# Patient Record
Sex: MALE | Race: WHITE | NOT HISPANIC OR LATINO | Employment: FULL TIME | URBAN - METROPOLITAN AREA
[De-identification: names, ages, dates, MRNs, and addresses within clinical notes are randomized per-mention and may not be internally consistent; named-entity substitution may affect disease eponyms.]

---

## 2017-03-10 ENCOUNTER — ALLSCRIPTS OFFICE VISIT (OUTPATIENT)
Dept: OTHER | Facility: OTHER | Age: 57
End: 2017-03-10

## 2017-03-10 DIAGNOSIS — R10.13 EPIGASTRIC PAIN: ICD-10-CM

## 2017-03-10 DIAGNOSIS — R10.11 RIGHT UPPER QUADRANT PAIN: ICD-10-CM

## 2017-03-16 ENCOUNTER — GENERIC CONVERSION - ENCOUNTER (OUTPATIENT)
Dept: OTHER | Facility: OTHER | Age: 57
End: 2017-03-16

## 2017-03-16 ENCOUNTER — HOSPITAL ENCOUNTER (OUTPATIENT)
Dept: ULTRASOUND IMAGING | Facility: CLINIC | Age: 57
Discharge: HOME | End: 2017-03-16
Payer: COMMERCIAL

## 2017-03-16 DIAGNOSIS — R10.13 EPIGASTRIC PAIN: ICD-10-CM

## 2017-03-16 DIAGNOSIS — R10.11 RIGHT UPPER QUADRANT PAIN: ICD-10-CM

## 2017-03-16 PROCEDURE — 76705 ECHO EXAM OF ABDOMEN: CPT

## 2017-03-18 LAB
A/G RATIO (HISTORICAL): 2.4 (CALC) (ref 1–2.5)
ALBUMIN SERPL BCP-MCNC: 4.4 G/DL (ref 3.6–5.1)
ALP SERPL-CCNC: 86 U/L (ref 40–115)
ALT SERPL W P-5'-P-CCNC: 21 U/L (ref 9–46)
AST SERPL W P-5'-P-CCNC: 21 U/L (ref 10–35)
BASOPHILS # BLD AUTO: 2.9 %
BASOPHILS # BLD AUTO: 580 CELLS/UL (ref 0–200)
BILIRUB SERPL-MCNC: 1.3 MG/DL (ref 0.2–1.2)
BUN SERPL-MCNC: 18 MG/DL (ref 7–25)
BUN/CREA RATIO (HISTORICAL): ABNORMAL (CALC) (ref 6–22)
CALCIUM SERPL-MCNC: 9 MG/DL (ref 8.6–10.3)
CHLORIDE SERPL-SCNC: 102 MMOL/L (ref 98–110)
CO2 SERPL-SCNC: 32 MMOL/L (ref 20–31)
CREAT SERPL-MCNC: 0.98 MG/DL (ref 0.7–1.33)
DEPRECATED RDW RBC AUTO: 14.6 % (ref 11–15)
EGFR AFRICAN AMERICAN (HISTORICAL): 97 ML/MIN/1.73M2
EGFR-AMERICAN CALC (HISTORICAL): 84 ML/MIN/1.73M2
EOSINOPHIL # BLD AUTO: 0.6 %
EOSINOPHIL # BLD AUTO: 120 CELLS/UL (ref 15–500)
GAMMA GLOBULIN (HISTORICAL): 1.8 G/DL (CALC) (ref 1.9–3.7)
GLUCOSE (HISTORICAL): 70 MG/DL (ref 65–99)
HCT VFR BLD AUTO: 48.5 % (ref 38.5–50)
HGB BLD-MCNC: 15.6 G/DL (ref 13.2–17.1)
LYMPHOCYTES # BLD AUTO: 74.1 %
LYMPHOCYTES # BLD AUTO: ABNORMAL CELLS/UL (ref 850–3900)
MCH RBC QN AUTO: 29.1 PG (ref 27–33)
MCHC RBC AUTO-ENTMCNC: 32.1 G/DL (ref 32–36)
MCV RBC AUTO: 90.7 FL (ref 80–100)
MONOCYTES # BLD AUTO: 720 CELLS/UL (ref 200–950)
MONOCYTES (HISTORICAL): 3.6 %
NEUTROPHILS # BLD AUTO: 18.8 %
NEUTROPHILS # BLD AUTO: 3760 CELLS/UL (ref 1500–7800)
PLATELET # BLD AUTO: 145 THOUSAND/UL (ref 140–400)
PMV BLD AUTO: 9.9 FL (ref 7.5–12.5)
POTASSIUM SERPL-SCNC: 4.8 MMOL/L (ref 3.5–5.3)
RBC # BLD AUTO: 5.35 MILLION/UL (ref 4.2–5.8)
RBC MORPHOLOGY (HISTORICAL): ABNORMAL
SODIUM SERPL-SCNC: 140 MMOL/L (ref 135–146)
TOTAL PROTEIN (HISTORICAL): 6.2 G/DL (ref 6.1–8.1)
WBC # BLD AUTO: 20 THOUSAND/UL (ref 3.8–10.8)

## 2017-03-22 ENCOUNTER — ALLSCRIPTS OFFICE VISIT (OUTPATIENT)
Dept: OTHER | Facility: OTHER | Age: 57
End: 2017-03-22

## 2017-03-28 ENCOUNTER — ALLSCRIPTS OFFICE VISIT (OUTPATIENT)
Dept: OTHER | Facility: OTHER | Age: 57
End: 2017-03-28

## 2017-03-30 ENCOUNTER — ANESTHESIA EVENT (OUTPATIENT)
Dept: GASTROENTEROLOGY | Facility: MEDICAL CENTER | Age: 57
End: 2017-03-30
Payer: COMMERCIAL

## 2017-03-31 ENCOUNTER — HOSPITAL ENCOUNTER (OUTPATIENT)
Facility: MEDICAL CENTER | Age: 57
Setting detail: OUTPATIENT SURGERY
Discharge: HOME | End: 2017-03-31
Attending: INTERNAL MEDICINE | Admitting: INTERNAL MEDICINE
Payer: COMMERCIAL

## 2017-03-31 ENCOUNTER — ANESTHESIA (OUTPATIENT)
Dept: GASTROENTEROLOGY | Facility: MEDICAL CENTER | Age: 57
End: 2017-03-31
Payer: COMMERCIAL

## 2017-03-31 ENCOUNTER — GENERIC CONVERSION - ENCOUNTER (OUTPATIENT)
Dept: GASTROENTEROLOGY | Facility: MEDICAL CENTER | Age: 57
End: 2017-03-31

## 2017-03-31 ENCOUNTER — LAB CONVERSION - ENCOUNTER (OUTPATIENT)
Dept: OTHER | Facility: OTHER | Age: 57
End: 2017-03-31

## 2017-03-31 VITALS
WEIGHT: 165 LBS | OXYGEN SATURATION: 96 % | HEART RATE: 65 BPM | BODY MASS INDEX: 23.1 KG/M2 | SYSTOLIC BLOOD PRESSURE: 116 MMHG | DIASTOLIC BLOOD PRESSURE: 81 MMHG | RESPIRATION RATE: 16 BRPM | TEMPERATURE: 96.9 F | HEIGHT: 71 IN

## 2017-03-31 DIAGNOSIS — R10.13 EPIGASTRIC PAIN: ICD-10-CM

## 2017-03-31 PROCEDURE — 88305 TISSUE EXAM BY PATHOLOGIST: CPT | Performed by: INTERNAL MEDICINE

## 2017-03-31 PROCEDURE — 88342 IMHCHEM/IMCYTCHM 1ST ANTB: CPT | Performed by: INTERNAL MEDICINE

## 2017-03-31 RX ORDER — PROPOFOL 10 MG/ML
INJECTION, EMULSION INTRAVENOUS AS NEEDED
Status: DISCONTINUED | OUTPATIENT
Start: 2017-03-31 | End: 2017-03-31 | Stop reason: SURG

## 2017-03-31 RX ORDER — PROPOFOL 10 MG/ML
INJECTION, EMULSION INTRAVENOUS AS NEEDED
Status: DISCONTINUED | OUTPATIENT
Start: 2017-03-31 | End: 2017-03-31

## 2017-03-31 RX ORDER — SODIUM CHLORIDE 9 MG/ML
125 INJECTION, SOLUTION INTRAVENOUS CONTINUOUS
Status: DISCONTINUED | OUTPATIENT
Start: 2017-03-31 | End: 2017-03-31 | Stop reason: HOSPADM

## 2017-03-31 RX ADMIN — PROPOFOL 150 MG: 10 INJECTION, EMULSION INTRAVENOUS at 14:03

## 2017-03-31 RX ADMIN — SODIUM CHLORIDE 125 ML/HR: 0.9 INJECTION, SOLUTION INTRAVENOUS at 13:58

## 2017-04-28 ENCOUNTER — APPOINTMENT (OUTPATIENT)
Dept: LAB | Facility: HOSPITAL | Age: 57
End: 2017-04-28
Attending: INTERNAL MEDICINE
Payer: COMMERCIAL

## 2017-04-28 ENCOUNTER — TRANSCRIBE ORDERS (OUTPATIENT)
Dept: ADMINISTRATIVE | Facility: HOSPITAL | Age: 57
End: 2017-04-28

## 2017-04-28 DIAGNOSIS — D72.820 LYMPHOCYTOSIS (SYMPTOMATIC): ICD-10-CM

## 2017-04-28 DIAGNOSIS — R10.11 RIGHT UPPER QUADRANT PAIN: ICD-10-CM

## 2017-04-28 DIAGNOSIS — R10.13 EPIGASTRIC PAIN: ICD-10-CM

## 2017-04-28 LAB
ALBUMIN SERPL BCP-MCNC: 4 G/DL (ref 3.5–5)
ALP SERPL-CCNC: 83 U/L (ref 46–116)
ALT SERPL W P-5'-P-CCNC: 36 U/L (ref 12–78)
ANION GAP SERPL CALCULATED.3IONS-SCNC: 5 MMOL/L (ref 4–13)
ANISOCYTOSIS BLD QL SMEAR: PRESENT
AST SERPL W P-5'-P-CCNC: 27 U/L (ref 5–45)
BASOPHILS # BLD MANUAL: 0 THOUSAND/UL (ref 0–0.1)
BASOPHILS NFR MAR MANUAL: 0 % (ref 0–1)
BILIRUB DIRECT SERPL-MCNC: 0.18 MG/DL (ref 0–0.2)
BILIRUB SERPL-MCNC: 0.9 MG/DL (ref 0.2–1)
BUN SERPL-MCNC: 15 MG/DL (ref 5–25)
CALCIUM SERPL-MCNC: 8.2 MG/DL (ref 8.3–10.1)
CHLORIDE SERPL-SCNC: 104 MMOL/L (ref 100–108)
CO2 SERPL-SCNC: 31 MMOL/L (ref 21–32)
CREAT SERPL-MCNC: 0.95 MG/DL (ref 0.6–1.3)
EOSINOPHIL # BLD MANUAL: 0 THOUSAND/UL (ref 0–0.4)
EOSINOPHIL NFR BLD MANUAL: 0 % (ref 0–6)
ERYTHROCYTE [DISTWIDTH] IN BLOOD BY AUTOMATED COUNT: 14.2 % (ref 11.6–15.1)
GFR SERPL CREATININE-BSD FRML MDRD: >60 ML/MIN/1.73SQ M
GLUCOSE P FAST SERPL-MCNC: 85 MG/DL (ref 65–99)
HCT VFR BLD AUTO: 43.9 % (ref 36.5–49.3)
HGB BLD-MCNC: 14.9 G/DL (ref 12–17)
IGA SERPL-MCNC: 77 MG/DL (ref 70–400)
LDH SERPL-CCNC: 183 U/L (ref 81–234)
LG PLATELETS BLD QL SMEAR: PRESENT
LYMPHOCYTES # BLD AUTO: 15.47 THOUSAND/UL (ref 0.6–4.47)
LYMPHOCYTES # BLD AUTO: 76 % (ref 14–44)
MCH RBC QN AUTO: 30.3 PG (ref 26.8–34.3)
MCHC RBC AUTO-ENTMCNC: 33.9 G/DL (ref 31.4–37.4)
MCV RBC AUTO: 89 FL (ref 82–98)
MONOCYTES # BLD AUTO: 0.61 THOUSAND/UL (ref 0–1.22)
MONOCYTES NFR BLD: 3 % (ref 4–12)
NEUTROPHILS # BLD MANUAL: 3.87 THOUSAND/UL (ref 1.85–7.62)
NEUTS SEG NFR BLD AUTO: 19 % (ref 43–75)
PLATELET # BLD AUTO: 135 THOUSANDS/UL (ref 149–390)
PLATELET BLD QL SMEAR: ADEQUATE
PMV BLD AUTO: 10.5 FL (ref 8.9–12.7)
POTASSIUM SERPL-SCNC: 3.6 MMOL/L (ref 3.5–5.3)
PROT SERPL-MCNC: 6.5 G/DL (ref 6.4–8.2)
RBC # BLD AUTO: 4.92 MILLION/UL (ref 3.88–5.62)
RBC MORPH BLD: PRESENT
SMUDGE CELLS BLD QL SMEAR: PRESENT
SODIUM SERPL-SCNC: 140 MMOL/L (ref 136–145)
TOTAL CELLS COUNTED SPEC: 100
VARIANT LYMPHS # BLD AUTO: 2 %
WBC # BLD AUTO: 20.35 THOUSAND/UL (ref 4.31–10.16)

## 2017-04-28 PROCEDURE — 83615 LACTATE (LD) (LDH) ENZYME: CPT

## 2017-04-28 PROCEDURE — 83516 IMMUNOASSAY NONANTIBODY: CPT

## 2017-04-28 PROCEDURE — 82784 ASSAY IGA/IGD/IGG/IGM EACH: CPT

## 2017-04-28 PROCEDURE — 88184 FLOWCYTOMETRY/ TC 1 MARKER: CPT

## 2017-04-28 PROCEDURE — 82248 BILIRUBIN DIRECT: CPT

## 2017-04-28 PROCEDURE — 88185 FLOWCYTOMETRY/TC ADD-ON: CPT

## 2017-04-28 PROCEDURE — 36415 COLL VENOUS BLD VENIPUNCTURE: CPT

## 2017-04-28 PROCEDURE — 80053 COMPREHEN METABOLIC PANEL: CPT

## 2017-04-28 PROCEDURE — 85007 BL SMEAR W/DIFF WBC COUNT: CPT

## 2017-04-28 PROCEDURE — 88189 FLOWCYTOMETRY/READ 16 & >: CPT

## 2017-04-28 PROCEDURE — 85027 COMPLETE CBC AUTOMATED: CPT

## 2017-05-01 LAB — TTG IGA SER-ACNC: <2 U/ML (ref 0–3)

## 2017-05-02 LAB — SCAN RESULT: NORMAL

## 2017-05-05 DIAGNOSIS — K82.4 CHOLESTEROLOSIS OF GALLBLADDER: ICD-10-CM

## 2017-05-05 DIAGNOSIS — D72.820 LYMPHOCYTOSIS (SYMPTOMATIC): ICD-10-CM

## 2017-05-07 ENCOUNTER — GENERIC CONVERSION - ENCOUNTER (OUTPATIENT)
Dept: OTHER | Facility: OTHER | Age: 57
End: 2017-05-07

## 2017-05-15 ENCOUNTER — ALLSCRIPTS OFFICE VISIT (OUTPATIENT)
Dept: OTHER | Facility: OTHER | Age: 57
End: 2017-05-15

## 2017-07-11 ENCOUNTER — HOSPITAL ENCOUNTER (OUTPATIENT)
Dept: ULTRASOUND IMAGING | Facility: CLINIC | Age: 57
Discharge: HOME | End: 2017-07-11
Payer: COMMERCIAL

## 2017-07-11 ENCOUNTER — GENERIC CONVERSION - ENCOUNTER (OUTPATIENT)
Dept: OTHER | Facility: OTHER | Age: 57
End: 2017-07-11

## 2017-07-11 DIAGNOSIS — K82.4 CHOLESTEROLOSIS OF GALLBLADDER: ICD-10-CM

## 2017-07-11 PROCEDURE — 76705 ECHO EXAM OF ABDOMEN: CPT

## 2017-07-21 ENCOUNTER — ALLSCRIPTS OFFICE VISIT (OUTPATIENT)
Dept: OTHER | Facility: OTHER | Age: 57
End: 2017-07-21

## 2017-07-24 ENCOUNTER — TRANSCRIBE ORDERS (OUTPATIENT)
Dept: ADMINISTRATIVE | Facility: HOSPITAL | Age: 57
End: 2017-07-24

## 2017-07-24 DIAGNOSIS — A08.11 EPIDEMIC VOMITING SYNDROME: Primary | ICD-10-CM

## 2017-08-11 DIAGNOSIS — Z00.00 ENCOUNTER FOR GENERAL ADULT MEDICAL EXAMINATION WITHOUT ABNORMAL FINDINGS: ICD-10-CM

## 2017-08-11 DIAGNOSIS — C91.10 CHRONIC LYMPHOCYTIC LEUKEMIA OF B-CELL TYPE NOT HAVING ACHIEVED REMISSION (HCC): ICD-10-CM

## 2017-08-11 DIAGNOSIS — Z13.6 ENCOUNTER FOR SCREENING FOR CARDIOVASCULAR DISORDERS: ICD-10-CM

## 2017-08-16 ENCOUNTER — ALLSCRIPTS OFFICE VISIT (OUTPATIENT)
Dept: OTHER | Facility: OTHER | Age: 57
End: 2017-08-16

## 2017-08-16 LAB
BILIRUB UR QL STRIP: NORMAL
CLARITY UR: NORMAL
COLOR UR: YELLOW
GLUCOSE (HISTORICAL): NORMAL
HGB UR QL STRIP.AUTO: NORMAL
KETONES UR STRIP-MCNC: NORMAL MG/DL
LEUKOCYTE ESTERASE UR QL STRIP: NORMAL
NITRITE UR QL STRIP: NORMAL
PH UR STRIP.AUTO: 6 [PH]
PROT UR STRIP-MCNC: NORMAL MG/DL
SP GR UR STRIP.AUTO: 1.02
UROBILINOGEN UR QL STRIP.AUTO: 0.2

## 2017-08-17 ENCOUNTER — TRANSCRIBE ORDERS (OUTPATIENT)
Dept: ADMINISTRATIVE | Facility: HOSPITAL | Age: 57
End: 2017-08-17

## 2017-08-17 ENCOUNTER — APPOINTMENT (OUTPATIENT)
Dept: LAB | Facility: HOSPITAL | Age: 57
End: 2017-08-17
Attending: INTERNAL MEDICINE
Payer: COMMERCIAL

## 2017-08-17 ENCOUNTER — GENERIC CONVERSION - ENCOUNTER (OUTPATIENT)
Dept: OTHER | Facility: OTHER | Age: 57
End: 2017-08-17

## 2017-08-17 DIAGNOSIS — Z00.00 ENCOUNTER FOR GENERAL ADULT MEDICAL EXAMINATION WITHOUT ABNORMAL FINDINGS: ICD-10-CM

## 2017-08-17 DIAGNOSIS — Z13.6 ENCOUNTER FOR SCREENING FOR CARDIOVASCULAR DISORDERS: ICD-10-CM

## 2017-08-17 DIAGNOSIS — C91.10 CHRONIC LYMPHOCYTIC LEUKEMIA OF B-CELL TYPE NOT HAVING ACHIEVED REMISSION (HCC): ICD-10-CM

## 2017-08-17 LAB
ALBUMIN SERPL BCP-MCNC: 4.2 G/DL (ref 3.5–5)
ALP SERPL-CCNC: 99 U/L (ref 46–116)
ALT SERPL W P-5'-P-CCNC: 48 U/L (ref 12–78)
ANION GAP SERPL CALCULATED.3IONS-SCNC: 5 MMOL/L (ref 4–13)
AST SERPL W P-5'-P-CCNC: 38 U/L (ref 5–45)
BASOPHILS # BLD MANUAL: 0 THOUSAND/UL (ref 0–0.1)
BASOPHILS NFR MAR MANUAL: 0 % (ref 0–1)
BILIRUB SERPL-MCNC: 1.5 MG/DL (ref 0.2–1)
BUN SERPL-MCNC: 18 MG/DL (ref 5–25)
CALCIUM SERPL-MCNC: 8.6 MG/DL (ref 8.3–10.1)
CHLORIDE SERPL-SCNC: 105 MMOL/L (ref 100–108)
CHOLEST SERPL-MCNC: 158 MG/DL (ref 50–200)
CO2 SERPL-SCNC: 32 MMOL/L (ref 21–32)
CREAT SERPL-MCNC: 1.02 MG/DL (ref 0.6–1.3)
EOSINOPHIL # BLD MANUAL: 0 THOUSAND/UL (ref 0–0.4)
EOSINOPHIL NFR BLD MANUAL: 0 % (ref 0–6)
ERYTHROCYTE [DISTWIDTH] IN BLOOD BY AUTOMATED COUNT: 13.9 % (ref 11.6–15.1)
GFR SERPL CREATININE-BSD FRML MDRD: 80 ML/MIN/1.73SQ M
GLUCOSE P FAST SERPL-MCNC: 83 MG/DL (ref 65–99)
HCT VFR BLD AUTO: 45.3 % (ref 36.5–49.3)
HDLC SERPL-MCNC: 37 MG/DL (ref 40–60)
HGB BLD-MCNC: 15.2 G/DL (ref 12–17)
LDH SERPL-CCNC: 166 U/L (ref 81–234)
LDLC SERPL CALC-MCNC: 106 MG/DL (ref 0–100)
LYMPHOCYTES # BLD AUTO: 22.56 THOUSAND/UL (ref 0.6–4.47)
LYMPHOCYTES # BLD AUTO: 71 % (ref 14–44)
MCH RBC QN AUTO: 30.1 PG (ref 26.8–34.3)
MCHC RBC AUTO-ENTMCNC: 33.6 G/DL (ref 31.4–37.4)
MCV RBC AUTO: 90 FL (ref 82–98)
MONOCYTES # BLD AUTO: 1.59 THOUSAND/UL (ref 0–1.22)
MONOCYTES NFR BLD: 5 % (ref 4–12)
NEUTROPHILS # BLD MANUAL: 6.04 THOUSAND/UL (ref 1.85–7.62)
NEUTS SEG NFR BLD AUTO: 19 % (ref 43–75)
PLATELET # BLD AUTO: 157 THOUSANDS/UL (ref 149–390)
PLATELET BLD QL SMEAR: ADEQUATE
PMV BLD AUTO: 10.1 FL (ref 8.9–12.7)
POTASSIUM SERPL-SCNC: 4.1 MMOL/L (ref 3.5–5.3)
PROT SERPL-MCNC: 6.7 G/DL (ref 6.4–8.2)
RBC # BLD AUTO: 5.05 MILLION/UL (ref 3.88–5.62)
RBC MORPH BLD: NORMAL
SODIUM SERPL-SCNC: 142 MMOL/L (ref 136–145)
TOTAL CELLS COUNTED SPEC: 100
TRIGL SERPL-MCNC: 75 MG/DL
VARIANT LYMPHS # BLD AUTO: 5 %
WBC # BLD AUTO: 31.78 THOUSAND/UL (ref 4.31–10.16)

## 2017-08-17 PROCEDURE — 85027 COMPLETE CBC AUTOMATED: CPT

## 2017-08-17 PROCEDURE — 80061 LIPID PANEL: CPT

## 2017-08-17 PROCEDURE — 80053 COMPREHEN METABOLIC PANEL: CPT

## 2017-08-17 PROCEDURE — 83615 LACTATE (LD) (LDH) ENZYME: CPT

## 2017-08-17 PROCEDURE — 36415 COLL VENOUS BLD VENIPUNCTURE: CPT

## 2017-08-17 PROCEDURE — 85007 BL SMEAR W/DIFF WBC COUNT: CPT

## 2017-08-23 ENCOUNTER — ALLSCRIPTS OFFICE VISIT (OUTPATIENT)
Dept: OTHER | Facility: OTHER | Age: 57
End: 2017-08-23

## 2017-08-24 ENCOUNTER — HOSPITAL ENCOUNTER (OUTPATIENT)
Dept: RADIOLOGY | Facility: HOSPITAL | Age: 57
Discharge: HOME | End: 2017-08-24
Attending: INTERNAL MEDICINE
Payer: COMMERCIAL

## 2017-08-24 ENCOUNTER — GENERIC CONVERSION - ENCOUNTER (OUTPATIENT)
Dept: OTHER | Facility: OTHER | Age: 57
End: 2017-08-24

## 2017-08-24 DIAGNOSIS — A08.11 EPIDEMIC VOMITING SYNDROME: ICD-10-CM

## 2017-08-24 PROCEDURE — A9541 TC99M SULFUR COLLOID: HCPCS

## 2017-08-24 PROCEDURE — 78264 GASTRIC EMPTYING IMG STUDY: CPT

## 2017-12-20 ENCOUNTER — TRANSCRIBE ORDERS (OUTPATIENT)
Dept: ADMINISTRATIVE | Facility: HOSPITAL | Age: 57
End: 2017-12-20

## 2017-12-20 ENCOUNTER — APPOINTMENT (OUTPATIENT)
Dept: LAB | Facility: HOSPITAL | Age: 57
End: 2017-12-20
Attending: INTERNAL MEDICINE
Payer: COMMERCIAL

## 2017-12-20 DIAGNOSIS — C91.10 CHRONIC LYMPHOCYTIC LEUKEMIA OF B-CELL TYPE NOT HAVING ACHIEVED REMISSION (HCC): ICD-10-CM

## 2017-12-20 LAB
ALBUMIN SERPL BCP-MCNC: 3.9 G/DL (ref 3.5–5)
ALP SERPL-CCNC: 97 U/L (ref 46–116)
ALT SERPL W P-5'-P-CCNC: 39 U/L (ref 12–78)
ANION GAP SERPL CALCULATED.3IONS-SCNC: 3 MMOL/L (ref 4–13)
AST SERPL W P-5'-P-CCNC: 25 U/L (ref 5–45)
BASOPHILS # BLD MANUAL: 0 THOUSAND/UL (ref 0–0.1)
BASOPHILS NFR MAR MANUAL: 0 % (ref 0–1)
BILIRUB SERPL-MCNC: 0.6 MG/DL (ref 0.2–1)
BUN SERPL-MCNC: 16 MG/DL (ref 5–25)
CALCIUM SERPL-MCNC: 8.3 MG/DL (ref 8.3–10.1)
CHLORIDE SERPL-SCNC: 105 MMOL/L (ref 100–108)
CO2 SERPL-SCNC: 32 MMOL/L (ref 21–32)
CREAT SERPL-MCNC: 1.01 MG/DL (ref 0.6–1.3)
EOSINOPHIL # BLD MANUAL: 0 THOUSAND/UL (ref 0–0.4)
EOSINOPHIL NFR BLD MANUAL: 0 % (ref 0–6)
ERYTHROCYTE [DISTWIDTH] IN BLOOD BY AUTOMATED COUNT: 14.1 % (ref 11.6–15.1)
GFR SERPL CREATININE-BSD FRML MDRD: 81 ML/MIN/1.73SQ M
GLUCOSE SERPL-MCNC: 87 MG/DL (ref 65–140)
HCT VFR BLD AUTO: 43.1 % (ref 36.5–49.3)
HGB BLD-MCNC: 14.7 G/DL (ref 12–17)
LDH SERPL-CCNC: 162 U/L (ref 81–234)
LYMPHOCYTES # BLD AUTO: 24.87 THOUSAND/UL (ref 0.6–4.47)
LYMPHOCYTES # BLD AUTO: 78 % (ref 14–44)
MCH RBC QN AUTO: 31.1 PG (ref 26.8–34.3)
MCHC RBC AUTO-ENTMCNC: 34.1 G/DL (ref 31.4–37.4)
MCV RBC AUTO: 91 FL (ref 82–98)
MONOCYTES # BLD AUTO: 1.59 THOUSAND/UL (ref 0–1.22)
MONOCYTES NFR BLD: 5 % (ref 4–12)
NEUTROPHILS # BLD MANUAL: 5.42 THOUSAND/UL (ref 1.85–7.62)
NEUTS SEG NFR BLD AUTO: 17 % (ref 43–75)
NRBC BLD AUTO-RTO: 8 /100 WBC (ref 0–2)
PLATELET # BLD AUTO: 140 THOUSANDS/UL (ref 149–390)
PLATELET BLD QL SMEAR: ABNORMAL
PMV BLD AUTO: 10.1 FL (ref 8.9–12.7)
POTASSIUM SERPL-SCNC: 4.6 MMOL/L (ref 3.5–5.3)
PROT SERPL-MCNC: 6.1 G/DL (ref 6.4–8.2)
RBC # BLD AUTO: 4.72 MILLION/UL (ref 3.88–5.62)
RBC MORPH BLD: NORMAL
SMUDGE CELLS BLD QL SMEAR: PRESENT
SODIUM SERPL-SCNC: 140 MMOL/L (ref 136–145)
TOTAL CELLS COUNTED SPEC: 100
WBC # BLD AUTO: 31.89 THOUSAND/UL (ref 4.31–10.16)

## 2017-12-20 PROCEDURE — 85007 BL SMEAR W/DIFF WBC COUNT: CPT

## 2017-12-20 PROCEDURE — 85027 COMPLETE CBC AUTOMATED: CPT

## 2017-12-20 PROCEDURE — 80053 COMPREHEN METABOLIC PANEL: CPT

## 2017-12-20 PROCEDURE — 83615 LACTATE (LD) (LDH) ENZYME: CPT

## 2017-12-20 PROCEDURE — 36415 COLL VENOUS BLD VENIPUNCTURE: CPT

## 2017-12-22 DIAGNOSIS — C91.10 CHRONIC LYMPHOCYTIC LEUKEMIA OF B-CELL TYPE NOT HAVING ACHIEVED REMISSION (HCC): ICD-10-CM

## 2017-12-27 ENCOUNTER — ALLSCRIPTS OFFICE VISIT (OUTPATIENT)
Dept: OTHER | Facility: OTHER | Age: 57
End: 2017-12-27

## 2017-12-28 NOTE — PROGRESS NOTES
Assessment   1  CLL (chronic lymphocytic leukemia) (204 10) (C91 10)    Plan   CLL (chronic lymphocytic leukemia)    · (1) CBC/PLT/DIFF; Status:Active; Requested for:20Apr2018; Perform:[facility]; Due:20Apr2019;Ordered; For:CLL (chronic lymphocytic leukemia); Ordered By:Farhan Mendoza;   · (1) COMPREHENSIVE METABOLIC PANEL; Status:Active; Requested for:20Apr2018; Perform:[facility]; Due:20Apr2019;Ordered; For:CLL (chronic lymphocytic leukemia); Ordered By:Farhan Mendoza;   · (1) LD (LDH); Status:Active; Requested for:23Yvj8888; Perform:[facility]; Due:20Apr2019;Ordered; For:CLL (chronic lymphocytic leukemia); Ordered By:Farhan Mendoza;   · Follow-up visit in 4 Months Evaluation and Treatment  Follow-up  Status: Hold For -    Scheduling  Requested for: 13Ckk8518   Ordered; For: CLL (chronic lymphocytic leukemia); Ordered By: Lauren Eisenmenger Performed:  Due: 54NGO7838    Discussion/Summary   Discussion Summary: It is a pleasant 53-year-old male who was referred to see us for an elevated white count and lymphocytosis  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 63% of total events  He had lack of CD38 expression which is a favorable prognosis  He does not have splenomegaly  I'll see him back in 4 months with repeat blood work  We will keep an eye on his white count if it goes to around 100 we will treat him  As long as he does not have complications we will continue to follow  Counseling Documentation With Imm: The patient was counseled regarding diagnostic results,-- instructions for management,-- prognosis,-- patient and family education  Goals and Barriers: The patient has the current Goals: Workup of elevated white count with lymphocytosis  The patent has the current Barriers: None  Patient's Capacity to Self-Care: Patient is able to Self-Care        Chief Complaint   Chief Complaint Free Text Note Form: Elevated white count with lymphocytosis  Workup revealed CLL      History of Present Illness   Previous Therapy:    Workup             Current Therapy: Workup             Interval History: The patient returns for follow-up visit  He states he is doing well  In the past His flow cytometry revealed CLL  It was CD38 negative which imparts a favorable prognosis  His most recent blood work reveals a white count of 31 89  4 months ago it was 31 78  Platelet count is now normal at 140 with a hemoglobin of 14 7  Denies any nausea denies any vomiting denies any diarrhea  Denies any B symptoms  Denies any left upper quadrant discomfort  The rest of his 14 point review of systems today was negative  Review of Systems   Complete-Male: As stated in the history of present illness otherwise her 14 point review of systems today was negative  Active Problems   1  Acute esophagitis (530 12) (K20 9)   2  Chronic GERD (530 81) (K21 9)   3  CLL (chronic lymphocytic leukemia) (204 10) (C91 10)   4  Dysplastic nevus (216 9) (D23 9)   5  Epigastric discomfort (789 06) (R10 13)   6  Gallbladder polyp (575 6) (K82 4)   7  Headache (784 0) (R51)   8  Hyperbilirubinemia (782 4) (E80 6)   9  Leukocytosis (288 60) (D72 829)   10  Lower back pain (724 2) (M54 5)   11  Lymphocytosis (288 61) (D72 820)   12  Male erectile dysfunction (607 84) (N52 9)   13  Plantar wart of right foot (078 12) (B07 0)   14  Regurgitation (787 03) (R11 10)   15  Rosacea (695 3) (L71 9)   16  Screening for cardiovascular condition (V81 2) (Z13 6)   17  Well adult exam (V70 0) (Z00 00)    Past Medical History   1  History of Abdominal pain, RUQ (789 01) (R10 11)   2  History of Flashers or floaters of right eye (379 24) (H43 391)   3  History of allergic rhinitis (V12 69) (Z87 09)   4  History of fever (V13 89) (Z87 898)   5  History of hair or hair follicle disorder (K66 7) (Z87 898)   6  History of prostatitis (V13 89) (Z87 438)   7   History of subconjunctival hemorrhage (V12 49) (Z86 69)   8  History of Influenza A (487 1) (J10 1)   9  History of Sore throat (462) (J02 9)  Active Problems And Past Medical History Reviewed: The active problems and past medical history were reviewed and updated today  Positive for hemorrhoidectomy      Surgical History   1  History of Colonoscopy (Fiberoptic)   2  History of Hemorrhoidectomy  Surgical History Reviewed: The surgical history was reviewed and updated today  Hemorrhoidectomy      Family History   Mother    1  Family history of Coronary Arteriosclerosis (V17 49)  Father    2  Family history of Coronary Arteriosclerosis (V17 49)  Brother    3  Family history of Benign Polyps Of The Large Intestine (V18 51)   4  Family history of Diabetes Mellitus (V18 0)  Family History Reviewed: The family history was reviewed and updated today  Social History    · Being A Social Drinker   · Never A Smoker   · Occupation:   · Sexual Orientation Same Sex  Social History Reviewed: The social history was reviewed and updated today  Does not smoke occasionally drinks wine and beer      Current Meds    1  Gaviscon Extra Strength 254-237 5 MG/5ML Oral Suspension; TAKE PRN AT BEDTIME; Therapy: 43SKH9240 to Recorded   2  Omeprazole 20 MG Oral Tablet Delayed Release; 1 PO BID; Therapy: 86CAF6517 to (Last Rx:40Lgx7537)  Requested for: 71Lgh8755 Ordered  Medication List Reviewed: The medication list was reviewed and updated today  Allergies   1  No Known Drug Allergies    Vitals   Vital Signs    Recorded: 88AZE8281 10:03AM   Temperature 96 4 F   Heart Rate 69   Respiration 16   Systolic 626   Diastolic 68   Height 5 ft 9 29 in   Weight 166 lb    BMI Calculated 24 31   BSA Calculated 1 91   O2 Saturation 98   Pain Scale 0     Physical Exam        Constitutional      General appearance: No acute distress, well appearing and well nourished  Eyes      Conjunctiva and lids: No swelling, erythema, or discharge         Pupils and irises: Equal, round and reactive to light  Ears, Nose, Mouth, and Throat      External inspection of ears and nose: Normal        Nasal mucosa, septum, and turbinates: Normal without edema or erythema  Oropharynx: Normal with no erythema, edema, exudate or lesions  Pulmonary      Respiratory effort: No increased work of breathing or signs of respiratory distress  Auscultation of lungs: Clear to auscultation, equal breath sounds bilaterally, no wheezes, no rales, no rhonci  Cardiovascular      Palpation of heart: Normal PMI, no thrills  Auscultation of heart: Normal rate and rhythm, normal S1 and S2, without murmurs  Examination of extremities for edema and/or varicosities: Normal        Carotid pulses: Normal        Abdomen      Abdomen: Non-tender, no masses  Liver and spleen: No hepatomegaly or splenomegaly  Lymphatic      Palpation of lymph nodes in neck: No lymphadenopathy  Musculoskeletal      Gait and station: Normal        Digits and nails: Normal without clubbing or cyanosis  Inspection/palpation of joints, bones, and muscles: Normal        Skin      Skin and subcutaneous tissue: Normal without rashes or lesions  Neurologic      Cranial nerves: Cranial nerves 2-12 intact  Sensation: No sensory loss         Psychiatric      Orientation to person, place and time: Normal        Mood and affect: Normal                ECOG 0       Signatures    Electronically signed by : AURE Obregon ; Dec 27 2017 10:26AM EST                       (Author)

## 2018-01-10 NOTE — PROGRESS NOTES
Assessment    1  Well adult exam (V70 0) (Z00 00)   2  Chronic GERD (530 81) (K21 9)   3  CLL (chronic lymphocytic leukemia) (204 10) (C91 10)   4  Plantar wart of right foot (078 12) (B07 0)    Plan  Plantar wart of right foot    · 2 - Arabella Figueroa  (Podiatry) Co-Management  *  Status: Hold For - Scheduling   Requested for: 84NQU1432  Care Summary provided  : Yes  Screening for cardiovascular condition, Well adult exam    · (1) LIPID PANEL, FASTING; Status:Active; Requested for:82Wce6519; Well adult exam    · (1) COMPREHENSIVE METABOLIC PANEL; Status:Active; Requested for:94Mid4801;    · Follow-up visit in 1 year Evaluation and Treatment  Follow-up  Status: Hold For -  Scheduling  Requested for: 18Kel3850    Discussion/Summary  health maintenance visit Currently, he eats a healthy diet and has an adequate exercise regimen  Prostate cancer screening: the risks and benefits of prostate cancer screening were discussed and patient decided against PSA screening  Testicular cancer screening: monthly self testicular exam was advised  Colorectal cancer screening: the risks and benefits of colorectal cancer screening were discussed and colorectal cancer screening is current  Screening lab work includes glucose, urinalysis and lipid profile  The risks and benefits of immunizations were discussed and recommended annual Flu vaccination  He was advised to be evaluated by an ophthalmologist  Advice and education were given regarding nutrition, self skin examination, aerobic exercise, cardiovascular risk reduction, weight bearing exercise, seat belt use, calcium supplements, sunscreen use and vitamin D supplements  Patient discussion: discussed with the patient  1 Well adult PE - recommended to follow a well balanced diet, regular exercise  2 GERD - continue Omeprazole 20 mg twice daily as per gastroenterologist Dr Humaira Sevilla  Patient is scheduled for gastric emptying study tomorrow to rule out gastroparesis      3 CLL - management per her hematologist Dr Carri Andino  4 Plantar wart R foot - referred patient for evaluation by podiatrist Dr Hoa Persaud  Schedule followup visit/ PE in 1 year  Call office visit any acute problems  Chief Complaint  Patient is here for an annual physical       History of Present Illness  HM, Adult Male: The patient is being seen for a health maintenance evaluation  The last health maintenance visit was 3 year(s) ago  Social History: Household members include lives by himself  He is unmarried  Work status: working full time  The patient has never smoked cigarettes  He reports rare alcohol use  He has never used illicit drugs  General Health: The patient's health since the last visit is described as good  He has regular dental visits  He complains of vision problems  Vision care includes uses glasses for driving  Immunizations status: up to date  Lifestyle:  He consumes a diverse and healthy diet  He does not have any weight concerns  He exercises regularly  Exercise includes walking  Screening: Prostate cancer screening includes last prostate-specific antigen testing 2014  Testicular cancer screening includes no previous evaluation and monthly self testicular examinations  Colorectal cancer screening includes last colonoscopy done 4/16  metabolic screening reviewed and current  risk screening reviewed and current  HPI: Patient presents for well adult PE  Patient has CLL  He has been followed by hematology Dr Carri Andino, last seen in May 2017  Patient has chronic GERD  He has been taking Omeprazole 20 mg twice daily since for 4 months  He has been followed by gastroenterologist Dr Joy Acevedo who ordered gastric emptying study to rule out gastroparesis  Patient will have test done next week  Colonoscopy done in April 2016, 2 polyps were removed  Colorectal surgeon Dr Mirta Nielsen recommended to repeat colonoscopy in 3 years  Patient decided against PSA screening   PSA level was 0 8 in 2014       Review of Systems    Constitutional: no fever, no chills and not feeling tired  Weight  has been stable  Eyes: no eye pain, no dryness of the eyes, eyes not red, no purulent discharge from the eyes and no itching of the eyes  No visual disturbances  ENT: no earache, no nosebleeds, no sore throat, no hearing loss, no nasal discharge and no hoarseness  Cardiovascular: no chest pain, no intermittent leg claudication, no palpitations and no extremity edema  Respiratory: no shortness of breath, no cough, no wheezing and no shortness of breath during exertion  Gastrointestinal: occasional bloating, but no abdominal pain, no nausea, no vomiting, no constipation, no diarrhea and no blood in stools  C/o occasional heartburn  Genitourinary: no dysuria, no incontinence and no nocturia  Musculoskeletal: no joint swelling    The patient presents with complaints of lower back and bilateral knee arthralgias  Integumentary: wart on R foot, but no rashes, no itching and no skin wound  Neurological: no headache, no numbness, no tingling, no dizziness and no fainting  Psychiatric: no anxiety, no sleep disturbances and no depression  Endocrine: no muscle weakness  Hematologic/Lymphatic: No complaints of swollen glands, no swollen glands in the neck, does not bleed easily, no easy bruising  Active Problems    1  Acute esophagitis (530 12) (K20 9)   2  Chronic GERD (530 81) (K21 9)   3  CLL (chronic lymphocytic leukemia) (204 10) (C91 10)   4  Dysplastic nevus (216 9) (D23 9)   5  Epigastric discomfort (789 06) (R10 13)   6  Gallbladder polyp (575 6) (K82 4)   7  Headache (784 0) (R51)   8  Hyperbilirubinemia (782 4) (E80 6)   9  Leukocytosis (288 60) (D72 829)   10  Lower back pain (724 2) (M54 5)   11  Lymphocytosis (288 61) (D72 820)   12  Male erectile dysfunction (607 84) (N52 9)   13  Regurgitation (787 03) (R11 10)   14   Rosacea (695 3) (L71 9)    Past Medical History    · History of Abdominal pain, RUQ (789 01) (R10 11)   · History of Flashers or floaters of right eye (379 24) (H43 391)   · History of allergic rhinitis (V12 69) (Z87 09)   · History of fever (V13 89) (A82 173)   · History of hair or hair follicle disorder (J53 0) (H20 910)   · History of prostatitis (V13 89) (X20 880)   · History of subconjunctival hemorrhage (V12 49) (Z86 69)   · History of Influenza A (487 1) (J10 1)   · History of Sore throat (462) (J02 9)    Surgical History    · History of Colonoscopy (Fiberoptic)   · History of Hemorrhoidectomy    Family History  Mother    · Family history of Coronary Arteriosclerosis (V17 49)  Father    · Family history of Coronary Arteriosclerosis (V17 49)  Brother    · Family history of Benign Polyps Of The Large Intestine (V18 51)   · Family history of Diabetes Mellitus (V18 0)    Social History    · Being A Social Drinker   · Never A Smoker   · Occupation:   · Professor at [employer]  · Sexual Orientation Same Sex    Current Meds   1  Gaviscon Extra Strength 254-237 5 MG/5ML Oral Suspension; TAKE PRN AT BEDTIME; Therapy: 73JYW1790 to Recorded   2  Omeprazole 20 MG Oral Tablet Delayed Release; 1 PO BID; Therapy: 71IWJ5140 to (Last Rx:14Aug2017)  Requested for: 30Vxp6445 Ordered    Allergies    1  No Known Drug Allergies    Vitals   Recorded: 16Aug2017 08:52AM   Temperature 96 9 F, Tympanic   Heart Rate 64, L Radial   Pulse Quality Normal, L Radial   Respiration Quality Normal   Respiration 12   Systolic 116, LUE, Sitting   Diastolic 82, LUE, Sitting   Height 5 ft 10 in   Weight 165 lb 4 oz   BMI Calculated 23 71   BSA Calculated 1 92   Pain Scale 0     Physical Exam    Constitutional   General appearance: No acute distress, well appearing and well nourished  Head and Face   Head and face: Normal     Eyes   Conjunctiva and lids: No erythema, swelling or discharge  Pupils and irises: Equal, round, reactive to light      Ears, Nose, Mouth, and Throat   External inspection of ears and nose: Normal     Otoscopic examination: Tympanic membranes translucent with normal light reflex  Canals patent without erythema  Hearing: Normal     Nasal mucosa, septum, and turbinates: Normal without edema or erythema  Lips, teeth, and gums: Normal, good dentition  Oropharynx: Normal with no erythema, edema, exudate or lesions  Neck   Neck: Supple, symmetric, trachea midline, no masses  Pulmonary   Respiratory effort: No increased work of breathing or signs of respiratory distress  Auscultation of lungs: Clear to auscultation  Cardiovascular   Auscultation of heart: Normal rate and rhythm, normal S1 and S2, no murmurs  Carotid pulses: 2+ bilaterally  no carotid bruits  Abdominal aorta: Normal   no abdominal bruits  Examination of extremities for edema and/or varicosities: Normal     Abdomen   Abdomen: Non-tender, no masses  Liver and spleen: No hepatomegaly or splenomegaly  Lymphatic   Palpation of lymph nodes in neck: No lymphadenopathy  Musculoskeletal   Gait and station: Normal     Inspection/palpation of digits and nails: Normal without clubbing or cyanosis  Inspection/palpation of joints, bones, and muscles: Normal     Skin   Skin and subcutaneous tissue: Abnormal   Small plantar wart on R foot  No rashes  Neurologic   Cranial nerves: Cranial nerves 2-12 intact  Sensation: No sensory loss  Psychiatric   Judgment and insight: Normal     Recent and remote memory: Intact  Mood and affect: Normal        Results/Data  *VB-Depression Screening 46Qpo3961 09:34AM Theresa Tan     Test Name Result Flag Reference   Depression Scale Result      Depression Screen - Negative For Symptoms       Future Appointments    Date/Time Provider Specialty Site   08/23/2017 10:00 AM Tilton Kanner, M D   Hematology Oncology [facility]     Signatures   Electronically signed by : AURE Huang ; Aug 16 2017 10:19AM EST (Author)

## 2018-01-10 NOTE — RESULT NOTES
Discussion/Summary   Please let pt know gastric empyting was within normal limits     Verified Results  NM GASTRIC EMPTYING 05Rpv8411 07:49AM Justo Maier     Test Name Result Flag Reference   NM GASTRIC EMPTYING (Report)     GASTRIC EMPTYING STUDY     INDICATION: Abdominal pain , acid reflux     COMPARISON: None available     TECHNIQUE:  The study was performed following the oral administration of 1 0 mCi Tc-99m sulfur colloid combined with scrambled eggs, as part of a standard meal  Following the meal, one minute anterior and posterior images were obtained immediately and    at 0 25 hours, 0 5 hour, 1 hour, 1 5 hour, 2 hour, 3 hour   intervals from the time of ingestion  Geometric mean analyses were then performed  As of March 1, 2016, this gastric emptying protocol has been modified and updated  The gastric emptying    times and the normal values reported below reflect the change in protocol  FINDINGS:     Gastric emptying at 0 5 hours = 14 (N < 30%)    Gastric emptying at 1 hour = 28 % (N = 10 - 70%)   Gastric emptying at 2 hours = 56 % (N = > 40%)   Gastric emptying at 3 hours = 84 % (N = > 70%)   Linear T-1/2 = 107 minutes       IMPRESSION:     Normal rate of gastric emptying         Workstation performed: [id number]     Signed by:   Farida Claros MD   8/24/17

## 2018-01-11 NOTE — RESULT NOTES
Verified Results  [address] [place] [id number] 08:19AM Scarlett Watkins Order Number: [id number]    - Patient Instructions: To schedule this appointment, please contact Central Scheduling at [phone]  Test Name Result Flag Reference   US RIGHT UPPER QUADRANT (Report)     RIGHT UPPER QUADRANT ULTRASOUND     INDICATION: Gallbladder cholesterolosis  COMPARISON: March 16, 2017     TECHNIQUE:  Real-time ultrasound of the right upper quadrant was performed with a curvilinear transducer with both volumetric sweeps and still imaging techniques  FINDINGS:     PANCREAS: Portions of the pancreas are obscured by bowel gas  Visualized portions of the pancreas are unremarkable  AORTA AND IVC: Visualized portions are normal for patient age  LIVER:   Size: Within normal range  The liver measures 14 cm in the midclavicular line  Contour: Surface contour is smooth  Parenchyma: Echogenicity and echotexture are within normal limits  No evidence of suspicious mass  Limited imaging of the main portal vein shows it to be patent and hepatopetal       BILIARY:   The gallbladder is normal in caliber  No wall thickening or pericholecystic fluid  No stones or sludge identified  There is a 2 mm echogenic focus in the gallbladder likely representing a small polyp, unchanged from previous examination  No sonographic Lyn's sign  No intrahepatic biliary dilatation  CBD measures 2 mm  No choledocholithiasis  KIDNEY:    Right kidney measures 11 cm  Within normal limits  ASCITES:  None  IMPRESSION:     1  No acute abnormality  2  Stable 2 mm gallbladder polyp  According to current literature guidelines (JACR 2013;10:953-956) small polyps this size are benign and no workup or followup is needed         Workstation performed: [id number]     Signed by:   Aditya Ball MD   7/13/17

## 2018-01-12 VITALS
HEART RATE: 64 BPM | DIASTOLIC BLOOD PRESSURE: 82 MMHG | RESPIRATION RATE: 12 BRPM | SYSTOLIC BLOOD PRESSURE: 138 MMHG | HEIGHT: 70 IN | WEIGHT: 165.25 LBS | BODY MASS INDEX: 23.66 KG/M2 | TEMPERATURE: 96.9 F

## 2018-01-12 VITALS
TEMPERATURE: 97.8 F | BODY MASS INDEX: 23.68 KG/M2 | SYSTOLIC BLOOD PRESSURE: 136 MMHG | HEIGHT: 70 IN | WEIGHT: 165.38 LBS | OXYGEN SATURATION: 99 % | HEART RATE: 76 BPM | DIASTOLIC BLOOD PRESSURE: 75 MMHG | RESPIRATION RATE: 16 BRPM

## 2018-01-12 NOTE — RESULT NOTES
Message   Duodenal biopsies specimen could not be processed for unclear reasons by pathology  Due to his abdominal symptoms of discomfort I would like for him to get serologies for celiac disease in May  Patient is already aware of this  All we have to do is send him the serologies for celiac disease which includes transglutaminase IgA levels and IgA which are both in the computer  Verified Results  (1) TISSUE EXAM [id number] 02:06PM Leo Loyola     Test Name Result Flag Reference   LAB AP CASE REPORT (Report)     Surgical Pathology Report             Case: [id number]                   Authorizing Provider: Mao Santana MD      Collected:      03/31/2017 1406        Ordering Location:   [address] [place] [address]    Received:      04/03/2017 [id number] [facility]                            Pathologist:      Karina Pak MD                              Specimens:  A) - Stomach, Stomach biopsy r/o H pylori                               B) - Duodenum, Duodenum biopsy r/o celiac   LAB AP FINAL DIAGNOSIS (Report)     A  Stomach, biopsy:    - Oxyntic mucosa with mild superficial chronic inactive gastritis  - Immunostain for H  pylori (with appropriate positive control) is   negative  - No intestinal metaplasia, dysplasia or neoplasia identified  B  Duodenum, biopsy:    - Specimen did not survive processing  Electronically signed by Karina Pak MD on 4/4/2017 at 9:48 AM   LAB AP SURGICAL ADDITIONAL INFORMATION (Report)     These tests were developed and their performance characteristics   determined by [facility] or [facility]  They may not be cleared or approved by the [place]  Food and   Drug Administration  The FDA has determined that such clearance or   approval is not necessary  These tests are used for clinical purposes  They should not be regarded as investigational or for research   This   laboratory has been approved by CLIA 88, designated as a high-complexity   laboratory and is qualified to perform these tests  - Interpretation performed at [facility] [employer]   LAB AP GROSS DESCRIPTION (Report)     A  The specimen is received in formalin, labeled with the patient's name   and hospital number, and is designated stomach biopsy rule out H    pylori  The specimen consists of several, rubbery, tan-brown tissue   fragments measuring 0 8 x 0 8 x 0 2 cm in aggregate dimension  Entirely   submitted  One cassette  B  The specimen is received in formalin, labeled with the patient's name   and hospital number, and is designated duodenum biopsy rule out celiac  The specimen consists of several, rubbery, tan-brown tissue fragments   measuring 0 5 x 0 5 x 0 15 cm in aggregate dimension  Entirely submitted  One cassette  Note: The estimated total formalin fixation time based upon information   provided by the submitting clinician and the standard processing schedule   is over 72 hours  Maryam Peña 43       Plan  Epigastric discomfort    · (1) IGA; Status:Active; Requested for:06Apr2017;    · (1) TISSUE TRANSGLUTAMINASE IGA; Status:Active; Requested for:06Apr2017;     Discussion/Summary   Duodenal biopsies specimen could not be processed for unclear reasons by pathology  Due to his abdominal symptoms of discomfort I would like for him to get serologies for celiac disease in May  Patient is already aware of this  All we have to do is send him the serologies for celiac disease which includes transglutaminase IgA levels and IgA which are both in the computer

## 2018-01-12 NOTE — RESULT NOTES
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)  Verified Results  (1) BILIRUBIN, DIRECT 28Apr2017 08:16AM Master Desai     Test Name Result Flag Reference   BILI, DIRECT 0 18 mg/dL  0 00-0 20     (1) TISSUE TRANSGLUTAMINASE IGA 28Apr2017 08:16AM Mina Shinebrenda Order Number: [id number]     Test Name Result Flag Reference   tTG IGA <2 U/mL  0 - 3   Negative        0 -  3                                Weak Positive   4 - 10                                Positive           >10   Tissue Transglutaminase (tTG) has been identified   as the endomysial antigen  Studies have demonstr-   ated that endomysial IgA antibodies have over 99%   specificity for gluten sensitive enteropathy      Performed at:  [facility] [address]  [id number]  : Connor Rios MD, Phone:  [phone]

## 2018-01-12 NOTE — RESULT NOTES
Verified Results  (1) CBC/PLT/DIFF [id number] 10:45AM Andreas Chandra   REPORT COMMENT:  FASTING:NO     Test Name Result Flag Reference   WHITE BLOOD CELL COUNT 20 0 Thousand/uL H 3 8-10 8   RED BLOOD CELL COUNT 5 35 Million/uL  4 20-5 80   HEMOGLOBIN 15 6 g/dL  13 2-17 1   HEMATOCRIT 48 5 %  38 5-50 0   MCV 90 7 fL  80 0-100 0   MCH 29 1 pg  27 0-33 0   EOSINOPHILS 0 6 %     BASOPHILS 2 9 %     CBC MORPHOLOGY   NORMAL   Red cell morphology appears unremarkable     Smudge cells present  Few abnormal lymphocytes noted   ABSOLUTE MONOCYTES 720 cells/uL  200-950   ABSOLUTE EOSINOPHILS 120 cells/uL     ABSOLUTE BASOPHILS 580 cells/uL H 0-200   NEUTROPHILS 18 8 %     LYMPHOCYTES 74 1 %     MONOCYTES 3 6 %     MCHC 32 1 g/dL  32 0-36 0   RDW 14 6 %  11 0-15 0   PLATELET COUNT 103 Thousand/uL  140-400   MPV 9 9 fL  7 5-12 5   ABSOLUTE NEUTROPHILS 3760 cells/uL  0388-7406   ABSOLUTE LYMPHOCYTES 92821 cells/uL H 850-3900     (1) COMPREHENSIVE METABOLIC PANEL [id number] 73:71IR Andreas Chandra     Test Name Result Flag Reference   GLUCOSE 70 mg/dL  65-99   Fasting reference interval   UREA NITROGEN (BUN) 18 mg/dL  7-25   CREATININE 0 98 mg/dL  0 70-1 33   For patients >52years of age, the reference limit  for Creatinine is approximately 13% higher for people  identified as -American  eGFR NON-AFR   AMERICAN 84 mL/min/1 73m2  > OR = 60   eGFR AFRICAN AMERICAN 97 mL/min/1 73m2  > OR = 60   BUN/CREATININE RATIO   3-86   NOT APPLICABLE (calc)   ALT 21 U/L  9-46   ALBUMIN 4 4 g/dL  3 6-5 1   GLOBULIN 1 8 g/dL (calc) L 1 9-3 7   ALBUMIN/GLOBULIN RATIO 2 4 (calc)  1 0-2 5   BILIRUBIN, TOTAL 1 3 mg/dL H 0 2-1 2   ALKALINE PHOSPHATASE 86 U/L     AST 21 U/L  10-35   SODIUM 140 mmol/L  135-146   POTASSIUM 4 8 mmol/L  3 5-5 3   CHLORIDE 102 mmol/L     CARBON DIOXIDE 32 mmol/L H 20-31   CALCIUM 9 0 mg/dL  8 6-10 3   PROTEIN, TOTAL 6 2 g/dL  6 1-8 1     US ABDOMEN LIMITED [id number] 09:40AM Manuel, Linda Chakraborty Order Number: [id number]   Performing Comments: Please focus on RUQ and epigastric region   - Patient Instructions: To schedule this appointment, please contact Central Scheduling at [phone]  Test Name Result Flag Reference   US ABDOMEN LIMITED (Report)     RIGHT UPPER QUADRANT ULTRASOUND     INDICATION: Right upper quadrant and epigastric pain     COMPARISON: None  TECHNIQUE:  Real-time ultrasound of the right upper quadrant was performed with a curvilinear transducer with both volumetric sweeps and still imaging techniques  FINDINGS:     PANCREAS: Portions of the pancreas are obscured by bowel gas  Visualized portions of the pancreas are unremarkable  AORTA AND IVC: Visualized portions are normal for patient age  LIVER:   Size: Within normal range  The liver measures 14 cm in the midclavicular line  Contour: Surface contour is smooth  Parenchyma: Echogenicity and echotexture are within normal limits  No evidence of suspicious mass  Limited imaging of the main portal vein shows it to be patent and hepatopetal       BILIARY:   The gallbladder is normal in caliber  No wall thickening or pericholecystic fluid  No stones or sludge identified  There is a 2 mm echogenic focus in the gallbladder likely representing a small polyp  No sonographic Lyn's sign  No intrahepatic biliary dilatation  CBD measures 4 mm  No choledocholithiasis  KIDNEY:    Right kidney measures 11 5 x 3 6 cm  Within normal limits  ASCITES:  None  IMPRESSION:     1  No acute abnormality  2  2 mm gallbladder polyp  According to current literature guidelines (JACR 2013;10:953-956) small polyps this size are benign and no workup or followup is needed         Workstation performed: [id number]     Signed by:   Galindo Sousa MD   3/16/17       Plan  Leukocytosis, Lymphocytosis    · 1 - Herman Augustine MD, Heydi Laura  (Hematology/Oncology) Physician Referral  Consult Only: the  expectation is that the referring provider will communicate back to the patient on  treatment options  Evaluation and Treatment: the expectation is that the referred to  provider will communicate back to the patient on treatment options  Status: Active   Requested for: 21Mar2017  Care Summary provided  : Yes   · (1) CBC/PLT/DIFF; Status:Hold For - Exact Date;  Requested for:Approx [id number];

## 2018-01-13 VITALS
HEIGHT: 69 IN | OXYGEN SATURATION: 97 % | DIASTOLIC BLOOD PRESSURE: 78 MMHG | RESPIRATION RATE: 16 BRPM | SYSTOLIC BLOOD PRESSURE: 104 MMHG | HEART RATE: 74 BPM | TEMPERATURE: 96.9 F | WEIGHT: 163 LBS | BODY MASS INDEX: 24.14 KG/M2

## 2018-01-13 VITALS
HEART RATE: 69 BPM | TEMPERATURE: 96.6 F | DIASTOLIC BLOOD PRESSURE: 64 MMHG | HEIGHT: 70 IN | OXYGEN SATURATION: 98 % | WEIGHT: 160.13 LBS | SYSTOLIC BLOOD PRESSURE: 118 MMHG | BODY MASS INDEX: 22.92 KG/M2

## 2018-01-13 VITALS
WEIGHT: 167.13 LBS | HEIGHT: 70 IN | OXYGEN SATURATION: 99 % | TEMPERATURE: 97.3 F | SYSTOLIC BLOOD PRESSURE: 110 MMHG | BODY MASS INDEX: 23.93 KG/M2 | HEART RATE: 80 BPM | DIASTOLIC BLOOD PRESSURE: 60 MMHG

## 2018-01-14 VITALS
HEART RATE: 58 BPM | WEIGHT: 163.38 LBS | SYSTOLIC BLOOD PRESSURE: 136 MMHG | OXYGEN SATURATION: 97 % | TEMPERATURE: 97.7 F | DIASTOLIC BLOOD PRESSURE: 82 MMHG | RESPIRATION RATE: 16 BRPM | BODY MASS INDEX: 23.39 KG/M2 | HEIGHT: 70 IN

## 2018-01-14 NOTE — RESULT NOTES
Verified Results  (1) LIPID PANEL, FASTING [id number] 09:15AM Riki Baker Order Number: [id number]     Test Name Result Flag Reference   CHOLESTEROL 158 mg/dL     HDL,DIRECT 37 mg/dL L 40-60   Specimen collection should occur prior to Metamizole administration due to the potential for falsley depressed results  LDL CHOLESTEROL CALCULATED 106 mg/dL H 0-100   Triglyceride:        Normal ??? ??? ??? ??? ??? ??? ??? <150 mg/dl   ??? ??? ???Borderline High ??? ??? 150-199 mg/dl   ??? ??? ? ?? High ??? ??? ??? ??? ??? ??? ??? 200-499 mg/dl   ??? ??? ? ??Very High ??? ??? ??? ??? ??? >499 mg/dl      Cholesterol:       Desirable ??? ??? ??? ??? <200 mg/dl   ??? ??? Borderline High ??? 200-239 mg/dl   ??? ??? High ??? ??? ??? ??? ??? ??? >239 mg/dl      HDL Cholesterol:       High ??? ???>59 mg/dL   ??? ??? Low ??? ??? <41 mg/dL      This screening LDL is a calculated result  It does not have the accuracy of the Direct Measured LDL in the monitoring of patients with hyperlipidemia and/or statin therapy  Direct Measure LDL (QVH806) must be ordered separately in these patients  TRIGLYCERIDES 75 mg/dL  <=150   Specimen collection should occur prior to N-Acetylcysteine or Metamizole administration due to the potential for falsely depressed results

## 2018-01-16 NOTE — RESULT NOTES
Message   U/S of abdomen showed no acute abnormalities   Liver WNL  No gallbladder stones or sludge noted- there was a 2mm gallbladder polyp noted but according to current guidelines, no further workup of this is needed- I also doubt this is the source of his pain      Haven't received blood work results yet   If still having pain, we can refer to GI      Verified Results  [address] [place] [address] [id number] 09:40AM David Barros Order Number: [id number]   Performing Comments: Please focus on RUQ and epigastric region   - Patient Instructions: To schedule this appointment, please contact Central Scheduling at [phone]  Test Name Result Flag Reference   US ABDOMEN LIMITED (Report)     RIGHT UPPER QUADRANT ULTRASOUND     INDICATION: Right upper quadrant and epigastric pain     COMPARISON: None  TECHNIQUE:  Real-time ultrasound of the right upper quadrant was performed with a curvilinear transducer with both volumetric sweeps and still imaging techniques  FINDINGS:     PANCREAS: Portions of the pancreas are obscured by bowel gas  Visualized portions of the pancreas are unremarkable  AORTA AND IVC: Visualized portions are normal for patient age  LIVER:   Size: Within normal range  The liver measures 14 cm in the midclavicular line  Contour: Surface contour is smooth  Parenchyma: Echogenicity and echotexture are within normal limits  No evidence of suspicious mass  Limited imaging of the main portal vein shows it to be patent and hepatopetal       BILIARY:   The gallbladder is normal in caliber  No wall thickening or pericholecystic fluid  No stones or sludge identified  There is a 2 mm echogenic focus in the gallbladder likely representing a small polyp  No sonographic Lyn's sign  No intrahepatic biliary dilatation  CBD measures 4 mm  No choledocholithiasis  KIDNEY:    Right kidney measures 11 5 x 3 6 cm  Within normal limits  ASCITES:  None  IMPRESSION:     1  No acute abnormality  2  2 mm gallbladder polyp  According to current literature guidelines (JACR 2013;10:953-956) small polyps this size are benign and no workup or followup is needed         Workstation performed: [id number]     Signed by:   Wilbert Johnson MD   3/16/17       Signatures   Electronically signed by : ELISABET Mayer; Mar 16 2017  2:29PM EST                       (Author)

## 2018-01-16 NOTE — PROGRESS NOTES
Assessment    1  Abdominal pain, RUQ (789 01) (R10 11)   2  Epigastric discomfort (789 06) (R10 13)    Plan  Abdominal pain, RUQ, Epigastric discomfort    · (1) CBC/PLT/DIFF; Status:Active; Requested for:10Mar2017;    · (1) COMPREHENSIVE METABOLIC PANEL; Status:Active; Requested for:10Mar2017;    · [facility]; Status:Hold For - Scheduling; Requested for:10Mar2017;    · *1 - [address] [facility] Physician Referral  Consult  Only: the expectation is that the referring provider will communicate back  to the patient on treatment options  Evaluation and Treatment: the expectation is that  the referred to provider will communicate back to the patient on treatment options  Status: Active  Requested for: 39EUT2295  Care Summary provided  : Yes  Epigastric discomfort    · Esomeprazole Magnesium 20 MG Oral Capsule Delayed Release (NexIUM); take  1 capsule by mouth once daily    Discussion/Summary    To get U/S of abdomen done  Blood work as ordered  May take OTC Nexium prn  Avoid greasy/fried foods, caffeine, ETOH, dairy products  If U/S and blood work are unremarkable but he is still symptomatic, to see GI  If any worsening pain, to go to the ED  Possible side effects of new medications were reviewed with the patient/guardian today  The treatment plan was reviewed with the patient/guardian  The patient/guardian understands and agrees with the treatment plan      Chief Complaint  right sided pain      History of Present Illness  HPI: Pt presents by himself today for an acute visit       C/o epigastric and RUQ discomfort over the past 2-3 weeks  Intermittent pain   Reports he thinks his acid reflux is flaring up, but he worried about gallstones  Pain radiates to right lateral side of abdomen at times  Has been taking Nexium OTC prn which did help symptoms  Coffee and tea make pain worse   Gnawing sensation   Had a few days of lighter colored stools, a few episodes of diarrhea- this seems to have resolved   No blood or mucus in stool   No N/V, no change in appetite   +Increased belching   Feels bloated on occasion   Diet isn't necessary high in fatty foods, but he also doesn't avoid these foods   Social ETOH, wine on weekends        Review of Systems    Constitutional: no fever, not feeling poorly, no chills and not feeling tired  Cardiovascular: no chest pain, no intermittent leg claudication, no palpitations and no lower extremity edema  Respiratory: no shortness of breath, no cough, no wheezing and no shortness of breath during exertion  Gastrointestinal: as noted in HPI  Genitourinary: no dysuria  Neurological: no headache and no dizziness  ROS reviewed  Active Problems    1  Allergic rhinitis (477 9) (J30 9)   2  Dysplastic nevus (216 9) (D23 9)   3  Fever (780 60) (R50 9)   4  Headache (784 0) (R51)   5  Influenza A (487 1) (J10 1)   6  Lower back pain (724 2) (M54 5)   7  Male erectile dysfunction (607 84) (N52 9)   8  Rosacea (695 3) (L71 9)    Past Medical History    1  History of Flashers or floaters of right eye (379 24) (H43 391)   2  History of hair or hair follicle disorder (B24 5) (Z87 898)   3  History of prostatitis (V13 89) (Z87 438)   4  History of subconjunctival hemorrhage (V12 49) (Z86 69)   5  History of Sore throat (462) (J02 9)  Active Problems And Past Medical History Reviewed: The active problems and past medical history were reviewed and updated today  Family History  Mother    1  Family history of Coronary Arteriosclerosis (V17 49)  Father    2  Family history of Coronary Arteriosclerosis (V17 49)  Brother    3  Family history of Benign Polyps Of The Large Intestine (V18 51)   4  Family history of Diabetes Mellitus (V18 0)    Social History    · Being A Social Drinker   · Never A Smoker   · Occupation:   · Professor at [employer]  · Sexual Orientation Same Sex  The social history was reviewed and updated today   The social history was reviewed and is unchanged  Surgical History    1  History of Colonoscopy (Fiberoptic)   2  History of Hemorrhoidectomy    Current Meds   1  Budesonide 32 MCG/ACT Nasal Suspension; USE 2 SPRAYS IN EACH NOSTRIL   DAILY; Therapy: 01JJE0703 to (Last Rx:65Yzp7095)  Requested for: 56ARQ8020 Ordered   2  Finasteride 5 MG Oral Tablet; TAKE ONE FOURTH (1/4) TABLET(S) ONCEDAILY; Therapy: 84YRY5589 to (Evaluate:37Jah9155)  Requested for: 40MJM6710; Last   Rx:93Mie5207 Ordered   3  Triamcinolone Acetonide 0 1 % External Cream;   Therapy: 44UEQ8128 to (Evaluate:20Jun2015) Recorded    The medication list was reviewed and updated today  Allergies    1  No Known Drug Allergies    Vitals   Recorded: 78SUW9532 03:16PM   Temperature 97 3 F   Heart Rate 61   Respiration 16   Systolic 872   Diastolic 72   Height 5 ft 10 in   Weight 162 lb 8 0 oz   BMI Calculated 23 32   BSA Calculated 1 91   O2 Saturation 99   Pain Scale 6     Physical Exam    Constitutional   General appearance: No acute distress, well appearing and well nourished  Eyes   Conjunctiva and lids: No swelling, erythema, or discharge  Pupils and irises: Equal, round and reactive to light  Ears, Nose, Mouth, and Throat   Oropharynx: Normal with no erythema, edema, exudate or lesions  Pulmonary   Respiratory effort: No increased work of breathing or signs of respiratory distress  Auscultation of lungs: Clear to auscultation, equal breath sounds bilaterally, no wheezes, no rales, no rhonci  Cardiovascular   Auscultation of heart: Normal rate and rhythm, normal S1 and S2, without murmurs  Examination of extremities for edema and/or varicosities: Normal     Abdomen   Abdomen: Abnormal   The abdomen was flat  Bowel sounds were normal  There was mild tenderness in the right upper quadrant  The abdomen was not firm and not rigid  No rebound tenderness  No guarding   There was a negative Lyn's sign, negative Rovsing's sign, negative obturator sign and negative psoas sign  no masses palpated  The abdomen was normal to percussion and did not have ascites  no CVA tenderness   Liver and spleen: No hepatomegaly or splenomegaly  Musculoskeletal   Gait and station: Normal     Skin   Skin and subcutaneous tissue: Normal without rashes or lesions  Psychiatric   Orientation to person, place and time: Normal     Mood and affect: Normal          Attending Note  Collaborating Physician Note: Collaborating Note: I did not interview and examine the patient and I agree with the Advanced Practitioner note  Signatures   Electronically signed by : ELISABET Luque; Mar 10 2017  3:59PM EST                       (Author)    Electronically signed by :  Ck Saunders MD; Mar 10 2017  4:09PM EST                       (Author)

## 2018-01-16 NOTE — RESULT NOTES
Message  I called patient with blood test report  Lyme disease test is positive for IgG Ab and negative for IgM  antibody  Patient states that he feels much better after starting on Tamiflu for Influenza  No fever, no headache, no body aches  Appetite has improved  Verified Results  (Q) LYME DISEASE ANTIBODIES (IGG, IGM) [place] [place] 08Oct2016 10:53AM Alanna Kearney     Test Name Result Flag Reference   LYME DISEASE AB (IGG) WB POSITIVE A NEGATIVE   18 KD (IGG) BAND REACTIVE A    23 KD (IGG) BAND REACTIVE A    28 KD (IGG) BAND REACTIVE A    30 KD (IGG) BAND REACTIVE A    39 KD (IGG) BAND REACTIVE A    41 KD (IGG) BAND REACTIVE A    45 KD (IGG) BAND REACTIVE A    58 KD (IGG) BAND REACTIVE A    66 KD (IGG) BAND REACTIVE A    93 KD (IGG) BAND REACTIVE A    LYME DISEASE AB (IGM) WB NEGATIVE  NEGATIVE   23 KD (IGM) BAND REACTIVE A    39 KD (IGM) BAND NON-REACTIVE     41 KD (IGM) BAND NON-REACTIVE     As per CDC criteria, a Lyme disease IgG Immunoblot must  show reactivity to at least 5 of 10 specific borrelial  proteins to be considered positive; similarly, a   positive Lyme disease IgM immunoblot requires  reactivity to 2 of 3 specific borrelial proteins  Although considered negative, IgG reactivity to fewer  specific borrelial proteins or IgM reactivity to only  1 protein may indicate recent B  burgdorferi infection  and warrant testing of a later sample  A positive IgM  but negative IgG result obtained more than a month  after onset of symptoms likely represents a false-  positive IgM result rather than acute Lyme disease  In rare instances, Lyme disease immunoblot reactivity  may represent antibodies induced by exposure to other  spirochetes         Signatures   Electronically signed by : AURE Lucas ; Oct 10 2016  4:03PM EST                       (Author)

## 2018-01-22 VITALS
TEMPERATURE: 97.3 F | OXYGEN SATURATION: 99 % | WEIGHT: 162.5 LBS | HEART RATE: 61 BPM | BODY MASS INDEX: 23.26 KG/M2 | RESPIRATION RATE: 16 BRPM | HEIGHT: 70 IN | DIASTOLIC BLOOD PRESSURE: 72 MMHG | SYSTOLIC BLOOD PRESSURE: 112 MMHG

## 2018-01-23 VITALS
TEMPERATURE: 96.4 F | HEART RATE: 69 BPM | HEIGHT: 69 IN | DIASTOLIC BLOOD PRESSURE: 68 MMHG | BODY MASS INDEX: 24.59 KG/M2 | OXYGEN SATURATION: 98 % | RESPIRATION RATE: 16 BRPM | WEIGHT: 166 LBS | SYSTOLIC BLOOD PRESSURE: 126 MMHG

## 2018-02-12 ENCOUNTER — TELEPHONE (OUTPATIENT)
Dept: GASTROENTEROLOGY | Facility: CLINIC | Age: 58
End: 2018-02-12

## 2018-02-13 DIAGNOSIS — K21.9 GASTROESOPHAGEAL REFLUX DISEASE, ESOPHAGITIS PRESENCE NOT SPECIFIED: Primary | ICD-10-CM

## 2018-02-13 RX ORDER — OMEPRAZOLE 20 MG/1
20 CAPSULE, DELAYED RELEASE ORAL 2 TIMES DAILY
Qty: 60 CAPSULE | Refills: 3 | Status: SHIPPED | OUTPATIENT
Start: 2018-02-13 | End: 2018-09-28 | Stop reason: SDUPTHER

## 2018-03-02 ENCOUNTER — TELEPHONE (OUTPATIENT)
Dept: HEMATOLOGY ONCOLOGY | Facility: CLINIC | Age: 58
End: 2018-03-02

## 2018-03-05 NOTE — TELEPHONE ENCOUNTER
Called patient and left message informing him it is ok to get a cleaning and his lab results  Informed patient to call us back to confirm that he received the messages about both

## 2018-03-05 NOTE — TELEPHONE ENCOUNTER
Please advise patient we do not have an recent CBC to evaluate WBC and platelet  Discussed with Dr Mona Holguin last plt count is 140, wbc 31  He is ok to get a cleaning

## 2018-04-20 DIAGNOSIS — C91.10 CHRONIC LYMPHOCYTIC LEUKEMIA OF B-CELL TYPE NOT HAVING ACHIEVED REMISSION (HCC): ICD-10-CM

## 2018-04-23 ENCOUNTER — TELEPHONE (OUTPATIENT)
Dept: HEMATOLOGY ONCOLOGY | Facility: CLINIC | Age: 58
End: 2018-04-23

## 2018-04-23 ENCOUNTER — APPOINTMENT (OUTPATIENT)
Dept: LAB | Facility: HOSPITAL | Age: 58
End: 2018-04-23
Attending: INTERNAL MEDICINE
Payer: COMMERCIAL

## 2018-04-23 DIAGNOSIS — C91.10 CHRONIC LYMPHOCYTIC LEUKEMIA OF B-CELL TYPE NOT HAVING ACHIEVED REMISSION (HCC): ICD-10-CM

## 2018-04-23 LAB
ALBUMIN SERPL BCP-MCNC: 3.9 G/DL (ref 3.5–5)
ALP SERPL-CCNC: 95 U/L (ref 46–116)
ALT SERPL W P-5'-P-CCNC: 30 U/L (ref 12–78)
ANION GAP SERPL CALCULATED.3IONS-SCNC: 4 MMOL/L (ref 4–13)
AST SERPL W P-5'-P-CCNC: 21 U/L (ref 5–45)
BASOPHILS # BLD MANUAL: 0 THOUSAND/UL (ref 0–0.1)
BASOPHILS NFR MAR MANUAL: 0 % (ref 0–1)
BILIRUB SERPL-MCNC: 1.1 MG/DL (ref 0.2–1)
BUN SERPL-MCNC: 20 MG/DL (ref 5–25)
CALCIUM SERPL-MCNC: 8.1 MG/DL (ref 8.3–10.1)
CHLORIDE SERPL-SCNC: 105 MMOL/L (ref 100–108)
CO2 SERPL-SCNC: 32 MMOL/L (ref 21–32)
CREAT SERPL-MCNC: 1.04 MG/DL (ref 0.6–1.3)
EOSINOPHIL # BLD MANUAL: 0 THOUSAND/UL (ref 0–0.4)
EOSINOPHIL NFR BLD MANUAL: 0 % (ref 0–6)
ERYTHROCYTE [DISTWIDTH] IN BLOOD BY AUTOMATED COUNT: 14.3 % (ref 11.6–15.1)
GFR SERPL CREATININE-BSD FRML MDRD: 78 ML/MIN/1.73SQ M
GLUCOSE P FAST SERPL-MCNC: 89 MG/DL (ref 65–99)
HCT VFR BLD AUTO: 44.1 % (ref 36.5–49.3)
HGB BLD-MCNC: 14.7 G/DL (ref 12–17)
LDH SERPL-CCNC: 177 U/L (ref 81–234)
LYMPHOCYTES # BLD AUTO: 28.49 THOUSAND/UL (ref 0.6–4.47)
LYMPHOCYTES # BLD AUTO: 72 % (ref 14–44)
MCH RBC QN AUTO: 30.4 PG (ref 26.8–34.3)
MCHC RBC AUTO-ENTMCNC: 33.3 G/DL (ref 31.4–37.4)
MCV RBC AUTO: 91 FL (ref 82–98)
MONOCYTES # BLD AUTO: 1.19 THOUSAND/UL (ref 0–1.22)
MONOCYTES NFR BLD: 3 % (ref 4–12)
NEUTROPHILS # BLD MANUAL: 2.37 THOUSAND/UL (ref 1.85–7.62)
NEUTS SEG NFR BLD AUTO: 6 % (ref 43–75)
PLATELET # BLD AUTO: 165 THOUSANDS/UL (ref 149–390)
PLATELET BLD QL SMEAR: ADEQUATE
PMV BLD AUTO: 10.3 FL (ref 8.9–12.7)
POTASSIUM SERPL-SCNC: 4 MMOL/L (ref 3.5–5.3)
PROT SERPL-MCNC: 6.6 G/DL (ref 6.4–8.2)
RBC # BLD AUTO: 4.84 MILLION/UL (ref 3.88–5.62)
RBC MORPH BLD: NORMAL
SMUDGE CELLS BLD QL SMEAR: PRESENT
SODIUM SERPL-SCNC: 141 MMOL/L (ref 136–145)
TOTAL CELLS COUNTED SPEC: 100
VARIANT LYMPHS # BLD AUTO: 19 %
WBC # BLD AUTO: 39.57 THOUSAND/UL (ref 4.31–10.16)

## 2018-04-23 PROCEDURE — 85007 BL SMEAR W/DIFF WBC COUNT: CPT

## 2018-04-23 PROCEDURE — 36415 COLL VENOUS BLD VENIPUNCTURE: CPT

## 2018-04-23 PROCEDURE — 85027 COMPLETE CBC AUTOMATED: CPT

## 2018-04-23 PROCEDURE — 80053 COMPREHEN METABOLIC PANEL: CPT

## 2018-04-23 PROCEDURE — 83615 LACTATE (LD) (LDH) ENZYME: CPT

## 2018-04-30 ENCOUNTER — OFFICE VISIT (OUTPATIENT)
Dept: HEMATOLOGY ONCOLOGY | Facility: HOSPITAL | Age: 58
End: 2018-04-30
Payer: COMMERCIAL

## 2018-04-30 VITALS
HEART RATE: 70 BPM | BODY MASS INDEX: 24.56 KG/M2 | OXYGEN SATURATION: 97 % | SYSTOLIC BLOOD PRESSURE: 114 MMHG | DIASTOLIC BLOOD PRESSURE: 70 MMHG | WEIGHT: 165.79 LBS | RESPIRATION RATE: 16 BRPM | HEIGHT: 69 IN | TEMPERATURE: 96.7 F

## 2018-04-30 DIAGNOSIS — C91.10 CLL (CHRONIC LYMPHOCYTIC LEUKEMIA) (HCC): Primary | ICD-10-CM

## 2018-04-30 PROCEDURE — 99214 OFFICE O/P EST MOD 30 MIN: CPT | Performed by: INTERNAL MEDICINE

## 2018-04-30 NOTE — PROGRESS NOTES
Hematology/Oncology Outpatient Follow- up Note  Hedyi Proctor 59 y o  male MRN: @ Encounter: [id number]        Date:  4/30/2018    Presenting Complaint/Diagnosis : Elevated white count with lymphocytosis  Workup revealed CLL       Previous Hematologic/ Oncologic History:      Workup     Current Hematologic/ Oncologic Treatment:      Workup     Interval History:    The patient returns for follow-up visit  He states he is doing well  His white count has gone up to 39  He is at baseline  Denies any nausea nausea vomiting as the diarrhea  Very occasionally feels slightly warm at night and sometimes has some sweats but denies any drenching night sweats, weight loss or fatigue  Denies any lymphadenopathy  Denies any nausea or vomiting  The rest of his 14 point review of systems today was negative  Labs:   Lab Results   Component Value Date    WBC 39 57 (HH) 04/23/2018    HGB 14 7 04/23/2018    HCT 44 1 04/23/2018    MCV 91 04/23/2018     04/23/2018     Lab Results   Component Value Date     04/23/2018    K 4 0 04/23/2018     04/23/2018    CO2 32 04/23/2018    ANIONGAP 4 04/23/2018    BUN 20 04/23/2018    CREATININE 1 04 04/23/2018    GLUCOSE 87 12/20/2017    GLUF 89 04/23/2018    CALCIUM 8 1 (L) 04/23/2018    AST 21 04/23/2018    ALT 30 04/23/2018    ALKPHOS 95 04/23/2018    PROT 6 6 04/23/2018    BILITOT 1 10 (H) 04/23/2018    EGFR 78 04/23/2018         ROS: As stated in the history of present illness otherwise his 14 point review of systems today was negative  Active Problems: There is no problem list on file for this patient        Past Medical History:   Past Medical History:   Diagnosis Date    Abdominal pain     Epigastric pain     Gallbladder polyp     Leukocytosis     Lymphocytosis     Panic attacks     years ago    Rosacea        Surgical History:   Past Surgical History:   Procedure Laterality Date    HEMORRHOID SURGERY      SC COLONOSCOPY FLX DX W/COLLJ SPEC WHEN PFRMD N/A 4/11/2016    Procedure: COLONOSCOPY;  Surgeon: Dolly Cameron MD;  Location:  GI LAB; Service: Colorectal    [place] ESOPHAGOGASTRODUODENOSCOPY TRANSORAL DIAGNOSTIC N/A 3/31/2017    Procedure: ESOPHAGOGASTRODUODENOSCOPY (EGD); Surgeon: Ania Hernandez MD;  Location: [facility] GI [facility]; Service: Gastroenterology       Family History:  No family history on file  Cancer-related family history is not on file  Social History:   Social History     Social History    Marital status: Single     Spouse name: N/A    Number of children: N/A    Years of education: N/A     Occupational History    Not on file  Social History Main Topics    Smoking status: Never Smoker    Smokeless tobacco: Not on file    Alcohol use Yes      Comment: socially    Drug use: No    Sexual activity: Not on file     Other Topics Concern    Not on file     Social History Narrative    No narrative on file       Current Medications:   Current Outpatient Prescriptions   Medication Sig Dispense Refill    omeprazole (PriLOSEC) 20 mg delayed release capsule Take 1 capsule (20 mg total) by mouth 2 (two) times a day 60 capsule 3    aspirin 81 MG tablet Take 81 mg by mouth       No current facility-administered medications for this visit  Allergies: No Known Allergies    Physical Exam:    Body surface area is 1 91 meters squared  Wt Readings from Last 3 Encounters:   04/30/18 75 2 kg (165 lb 12 6 oz)   12/27/17 75 3 kg (166 lb)   08/23/17 73 9 kg (163 lb)        Temp Readings from Last 3 Encounters:   04/30/18 (!) 96 7 °F (35 9 °C) (Tympanic)   12/27/17 (!) 96 4 °F (35 8 °C)   08/23/17 (!) 96 9 °F (36 1 °C)        BP Readings from Last 3 Encounters:   04/30/18 114/70   12/27/17 126/68   08/23/17 104/78         Pulse Readings from Last 3 Encounters:   04/30/18 70   12/27/17 69   08/23/17 74         Physical Exam     Constitutional   General appearance: No acute distress, well appearing and well nourished      Eyes   Conjunctiva and lids: No swelling, erythema or discharge  Pupils and irises: Equal, round and reactive to light  Ears, Nose, Mouth, and Throat   External inspection of ears and nose: Normal     Nasal mucosa, septum, and turbinates: Normal without edema or erythema  Oropharynx: Normal with no erythema, edema, exudate or lesions  Pulmonary   Respiratory effort: No increased work of breathing or signs of respiratory distress  Auscultation of lungs: Clear to auscultation  Cardiovascular   Palpation of heart: Normal PMI, no thrills  Auscultation of heart: Normal rate and rhythm, normal S1 and S2, without murmurs  Examination of extremities for edema and/or varicosities: Normal     Carotid pulses: Normal     Abdomen   Abdomen: Non-tender, no masses  Liver and spleen: No hepatomegaly or splenomegaly  Lymphatic   Palpation of lymph nodes in neck: No lymphadenopathy  Musculoskeletal   Gait and station: Normal     Digits and nails: Normal without clubbing or cyanosis  Inspection/palpation of joints, bones, and muscles: Normal     Skin   Skin and subcutaneous tissue: Normal without rashes or lesions  Neurologic   Cranial nerves: Cranial nerves 2-12 intact  Sensation: No sensory loss  Psychiatric   Orientation to person, place, and time: Normal     Mood and affect: Normal         Assessment / Plan: It is a pleasant 61-year-old male who was referred to see us for an elevated white count and lymphocytosis  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 63% of total events  He had lack of CD38 expression which is a favorable prognosis  He does not have splenomegaly  I'll see him back in 4 months with repeat blood work  We will keep an eye on his white count if it goes to around 100 we will treat him  As long as he does not have complications we will continue to follow  Goals and Barriers:  Current Goal:  Prolong Survival from CLL  Barriers: None        Patient's Capacity to Self Care:  Patient  able to self care  Portions of the record may have been created with voice recognition software   Occasional wrong word or "sound a like" substitutions may have occurred due to the inherent limitations of voice recognition software   Read the chart carefully and recognize, using context, where substitutions have occurred

## 2018-08-21 ENCOUNTER — APPOINTMENT (OUTPATIENT)
Dept: LAB | Facility: HOSPITAL | Age: 58
End: 2018-08-21
Attending: INTERNAL MEDICINE
Payer: COMMERCIAL

## 2018-08-21 ENCOUNTER — TELEPHONE (OUTPATIENT)
Dept: HEMATOLOGY ONCOLOGY | Facility: CLINIC | Age: 58
End: 2018-08-21

## 2018-08-21 DIAGNOSIS — C91.10 CLL (CHRONIC LYMPHOCYTIC LEUKEMIA) (HCC): ICD-10-CM

## 2018-08-21 LAB
ALBUMIN SERPL BCP-MCNC: 3.8 G/DL (ref 3.5–5)
ALP SERPL-CCNC: 98 U/L (ref 46–116)
ALT SERPL W P-5'-P-CCNC: 44 U/L (ref 12–78)
ANION GAP SERPL CALCULATED.3IONS-SCNC: 4 MMOL/L (ref 4–13)
AST SERPL W P-5'-P-CCNC: 29 U/L (ref 5–45)
BASOPHILS # BLD MANUAL: 0 THOUSAND/UL (ref 0–0.1)
BASOPHILS NFR MAR MANUAL: 0 % (ref 0–1)
BILIRUB SERPL-MCNC: 0.8 MG/DL (ref 0.2–1)
BUN SERPL-MCNC: 20 MG/DL (ref 5–25)
CALCIUM SERPL-MCNC: 8.6 MG/DL (ref 8.3–10.1)
CHLORIDE SERPL-SCNC: 108 MMOL/L (ref 100–108)
CO2 SERPL-SCNC: 30 MMOL/L (ref 21–32)
CREAT SERPL-MCNC: 1.02 MG/DL (ref 0.6–1.3)
EOSINOPHIL # BLD MANUAL: 0 THOUSAND/UL (ref 0–0.4)
EOSINOPHIL NFR BLD MANUAL: 0 % (ref 0–6)
ERYTHROCYTE [DISTWIDTH] IN BLOOD BY AUTOMATED COUNT: 14 % (ref 11.6–15.1)
GFR SERPL CREATININE-BSD FRML MDRD: 80 ML/MIN/1.73SQ M
GLUCOSE P FAST SERPL-MCNC: 75 MG/DL (ref 65–99)
HCT VFR BLD AUTO: 43.2 % (ref 36.5–49.3)
HGB BLD-MCNC: 14.1 G/DL (ref 12–17)
LDH SERPL-CCNC: 185 U/L (ref 81–234)
LYMPHOCYTES # BLD AUTO: 36.2 THOUSAND/UL (ref 0.6–4.47)
LYMPHOCYTES # BLD AUTO: 84 % (ref 14–44)
MCH RBC QN AUTO: 30.1 PG (ref 26.8–34.3)
MCHC RBC AUTO-ENTMCNC: 32.6 G/DL (ref 31.4–37.4)
MCV RBC AUTO: 92 FL (ref 82–98)
MONOCYTES # BLD AUTO: 2.16 THOUSAND/UL (ref 0–1.22)
MONOCYTES NFR BLD: 5 % (ref 4–12)
NEUTROPHILS # BLD MANUAL: 3.88 THOUSAND/UL (ref 1.85–7.62)
NEUTS SEG NFR BLD AUTO: 9 % (ref 43–75)
NRBC BLD AUTO-RTO: 0 /100 WBCS
PLATELET # BLD AUTO: 160 THOUSANDS/UL (ref 149–390)
PLATELET BLD QL SMEAR: ADEQUATE
PMV BLD AUTO: 10.1 FL (ref 8.9–12.7)
POTASSIUM SERPL-SCNC: 4.4 MMOL/L (ref 3.5–5.3)
PROT SERPL-MCNC: 6.5 G/DL (ref 6.4–8.2)
RBC # BLD AUTO: 4.69 MILLION/UL (ref 3.88–5.62)
RBC MORPH BLD: NORMAL
SODIUM SERPL-SCNC: 142 MMOL/L (ref 136–145)
TOTAL CELLS COUNTED SPEC: 100
VARIANT LYMPHS # BLD AUTO: 2 %
WBC # BLD AUTO: 43.1 THOUSAND/UL (ref 4.31–10.16)

## 2018-08-21 PROCEDURE — 83615 LACTATE (LD) (LDH) ENZYME: CPT

## 2018-08-21 PROCEDURE — 85007 BL SMEAR W/DIFF WBC COUNT: CPT

## 2018-08-21 PROCEDURE — 80053 COMPREHEN METABOLIC PANEL: CPT

## 2018-08-21 PROCEDURE — 85027 COMPLETE CBC AUTOMATED: CPT

## 2018-08-21 PROCEDURE — 36415 COLL VENOUS BLD VENIPUNCTURE: CPT

## 2018-08-29 ENCOUNTER — OFFICE VISIT (OUTPATIENT)
Dept: HEMATOLOGY ONCOLOGY | Facility: HOSPITAL | Age: 58
End: 2018-08-29
Payer: COMMERCIAL

## 2018-08-29 VITALS
OXYGEN SATURATION: 97 % | HEIGHT: 69 IN | BODY MASS INDEX: 24.73 KG/M2 | RESPIRATION RATE: 16 BRPM | DIASTOLIC BLOOD PRESSURE: 68 MMHG | WEIGHT: 167 LBS | SYSTOLIC BLOOD PRESSURE: 100 MMHG | HEART RATE: 75 BPM | TEMPERATURE: 97.1 F

## 2018-08-29 DIAGNOSIS — C91.10 CHRONIC LYMPHOCYTIC LEUKEMIA OF B-CELL TYPE NOT HAVING ACHIEVED REMISSION (HCC): ICD-10-CM

## 2018-08-29 DIAGNOSIS — C91.10 CLL (CHRONIC LYMPHOCYTIC LEUKEMIA) (HCC): Primary | ICD-10-CM

## 2018-08-29 PROCEDURE — 99214 OFFICE O/P EST MOD 30 MIN: CPT | Performed by: INTERNAL MEDICINE

## 2018-08-29 NOTE — PROGRESS NOTES
Hematology/Oncology Outpatient Follow- up Note  Kari Woody 59 y o  male MRN: @ Encounter: [id number]        Date:  8/29/2018    Presenting Complaint/Diagnosis : Elevated white count with lymphocytosis  Workup revealed CLL       Previous Hematologic/ Oncologic History:    Workup    Current Hematologic/ Oncologic Treatment:    Workup    Interval History:      The patient returns for follow-up visit  He states he is doing well  His white count has gone up to 43  He is at baseline  Denies any nausea nausea vomiting as the diarrhea  Very occasionally feels slightly warm at night and After he works out but denies any drenching night sweats, weight loss or fatigue  Denies any lymphadenopathy  Denies any nausea or vomiting  The rest of his 14 point review of systems today was negative  Test Results:    Imaging: No results found  Labs:   Lab Results   Component Value Date    WBC 43 10 (HH) 08/21/2018    HGB 14 1 08/21/2018    HCT 43 2 08/21/2018    MCV 92 08/21/2018     08/21/2018     Lab Results   Component Value Date     08/21/2018    K 4 4 08/21/2018     08/21/2018    CO2 30 08/21/2018    BUN 20 08/21/2018    CREATININE 1 02 08/21/2018    GLUF 75 08/21/2018    CALCIUM 8 6 08/21/2018    AST 29 08/21/2018    ALT 44 08/21/2018    ALKPHOS 98 08/21/2018    PROT 6 2 03/17/2017    BILITOT 1 3 (H) 03/17/2017    EGFR 80 08/21/2018       ROS: As stated in the history of present illness otherwise his 14 point review of systems today was negative  Active Problems: There is no problem list on file for this patient        Past Medical History:   Past Medical History:   Diagnosis Date    Abdominal pain     Epigastric pain     Gallbladder polyp     Leukocytosis     Lymphocytosis     Panic attacks     years ago    Rosacea        Surgical History:   Past Surgical History:   Procedure Laterality Date    HEMORRHOID SURGERY      [place] COLONOSCOPY FLX DX W/COLLJ SPEC WHEN PFRMD N/A 4/11/2016 Procedure: COLONOSCOPY;  Surgeon: Jessica Cortes MD;  Location:  GI LAB; Service: Colorectal    AL ESOPHAGOGASTRODUODENOSCOPY TRANSORAL DIAGNOSTIC N/A 3/31/2017    Procedure: ESOPHAGOGASTRODUODENOSCOPY (EGD); Surgeon: Danilo Arthur MD;  Location: [facility] GI LAB; Service: Gastroenterology       Family History:  No family history on file  Cancer-related family history is not on file  Social History:   Social History     Social History    Marital status: Single     Spouse name: N/A    Number of children: N/A    Years of education: N/A     Occupational History    Not on file  Social History Main Topics    Smoking status: Never Smoker    Smokeless tobacco: Not on file    Alcohol use Yes      Comment: socially    Drug use: No    Sexual activity: Not on file     Other Topics Concern    Not on file     Social History Narrative    No narrative on file       Current Medications:   Current Outpatient Prescriptions   Medication Sig Dispense Refill    Alum Hydroxide-Mag Carbonate (GAVISCON EXTRA STRENGTH) 968-030 MG/10ML SUSP Take by mouth      aspirin 81 MG tablet Take 81 mg by mouth      omeprazole (PriLOSEC) 20 mg delayed release capsule Take 1 capsule (20 mg total) by mouth 2 (two) times a day 60 capsule 3     No current facility-administered medications for this visit  Allergies: No Known Allergies    Physical Exam:    Body surface area is 1 92 meters squared      Wt Readings from Last 3 Encounters:   08/29/18 75 8 kg (167 lb)   04/30/18 75 2 kg (165 lb 12 6 oz)   12/27/17 75 3 kg (166 lb)        Temp Readings from Last 3 Encounters:   08/29/18 (!) 97 1 °F (36 2 °C) (Tympanic)   04/30/18 (!) 96 7 °F (35 9 °C) (Tympanic)   12/27/17 (!) 96 4 °F (35 8 °C)        BP Readings from Last 3 Encounters:   08/29/18 100/68   04/30/18 114/70   12/27/17 126/68         Pulse Readings from Last 3 Encounters:   08/29/18 75   04/30/18 70   12/27/17 69         Physical Exam     Constitutional General appearance: No acute distress, well appearing and well nourished  Eyes   Conjunctiva and lids: No swelling, erythema or discharge  Pupils and irises: Equal, round and reactive to light  Ears, Nose, Mouth, and Throat   External inspection of ears and nose: Normal     Nasal mucosa, septum, and turbinates: Normal without edema or erythema  Oropharynx: Normal with no erythema, edema, exudate or lesions  Pulmonary   Respiratory effort: No increased work of breathing or signs of respiratory distress  Auscultation of lungs: Clear to auscultation  Cardiovascular   Palpation of heart: Normal PMI, no thrills  Auscultation of heart: Normal rate and rhythm, normal S1 and S2, without murmurs  Examination of extremities for edema and/or varicosities: Normal     Carotid pulses: Normal     Abdomen   Abdomen: Non-tender, no masses  Liver and spleen: No hepatomegaly or splenomegaly  Lymphatic   Palpation of lymph nodes in neck: No lymphadenopathy  Musculoskeletal   Gait and station: Normal     Digits and nails: Normal without clubbing or cyanosis  Inspection/palpation of joints, bones, and muscles: Normal     Skin   Skin and subcutaneous tissue: Normal without rashes or lesions  Neurologic   Cranial nerves: Cranial nerves 2-12 intact  Sensation: No sensory loss  Psychiatric   Orientation to person, place, and time: Normal     Mood and affect: Normal         Assessment / Plan: It is a pleasant 78-year-old male who was referred to see us for an elevated white count and lymphocytosis  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 63% of total events  He had lack of CD38 expression which is a favorable prognosis  He does not have splenomegaly  I'll see him back in 4-5 months with repeat blood work  We will keep an eye on his white count if it goes to around 100 we will treat him  As long as he does not have complications we will continue to follow  Goals and Barriers:  Current Goal:  Prolong Survival from CLL  Barriers: None  Patient's Capacity to Self Care:  Patient able to self care  Portions of the record may have been created with voice recognition software   Occasional wrong word or "sound a like" substitutions may have occurred due to the inherent limitations of voice recognition software   Read the chart carefully and recognize, using context, where substitutions have occurred

## 2018-09-28 ENCOUNTER — OFFICE VISIT (OUTPATIENT)
Dept: FAMILY MEDICINE CLINIC | Facility: CLINIC | Age: 58
End: 2018-09-28
Payer: COMMERCIAL

## 2018-09-28 VITALS
BODY MASS INDEX: 24.11 KG/M2 | WEIGHT: 164.2 LBS | RESPIRATION RATE: 16 BRPM | SYSTOLIC BLOOD PRESSURE: 110 MMHG | HEART RATE: 76 BPM | TEMPERATURE: 97.7 F | DIASTOLIC BLOOD PRESSURE: 62 MMHG

## 2018-09-28 DIAGNOSIS — J06.9 ACUTE UPPER RESPIRATORY INFECTION: Primary | ICD-10-CM

## 2018-09-28 DIAGNOSIS — B96.89 BACTERIAL CONJUNCTIVITIS OF BOTH EYES: ICD-10-CM

## 2018-09-28 DIAGNOSIS — H10.9 BACTERIAL CONJUNCTIVITIS OF BOTH EYES: ICD-10-CM

## 2018-09-28 DIAGNOSIS — K21.9 CHRONIC GERD: ICD-10-CM

## 2018-09-28 DIAGNOSIS — C91.10 CLL (CHRONIC LYMPHOCYTIC LEUKEMIA) (HCC): ICD-10-CM

## 2018-09-28 PROBLEM — D72.820 LYMPHOCYTOSIS: Status: ACTIVE | Noted: 2017-03-21

## 2018-09-28 PROCEDURE — 1036F TOBACCO NON-USER: CPT | Performed by: FAMILY MEDICINE

## 2018-09-28 PROCEDURE — 99214 OFFICE O/P EST MOD 30 MIN: CPT | Performed by: FAMILY MEDICINE

## 2018-09-28 RX ORDER — OMEPRAZOLE 20 MG/1
20 CAPSULE, DELAYED RELEASE ORAL DAILY
Qty: 90 CAPSULE | Refills: 3 | Status: SHIPPED | OUTPATIENT
Start: 2018-09-28 | End: 2019-10-18 | Stop reason: SDUPTHER

## 2018-09-28 RX ORDER — AZITHROMYCIN 250 MG/1
TABLET, FILM COATED ORAL
Qty: 6 TABLET | Refills: 0 | Status: SHIPPED | OUTPATIENT
Start: 2018-09-28 | End: 2018-10-02

## 2018-09-28 RX ORDER — TOBRAMYCIN 3 MG/ML
SOLUTION/ DROPS OPHTHALMIC
Qty: 1 BOTTLE | Refills: 0 | Status: SHIPPED | OUTPATIENT
Start: 2018-09-28 | End: 2019-10-07 | Stop reason: ALTCHOICE

## 2018-09-28 RX ORDER — GUAIFENESIN 600 MG
1200 TABLET, EXTENDED RELEASE 12 HR ORAL EVERY 12 HOURS SCHEDULED
Qty: 14 TABLET | Refills: 0
Start: 2018-09-28 | End: 2019-10-07 | Stop reason: ALTCHOICE

## 2018-09-28 NOTE — ASSESSMENT & PLAN NOTE
Avoid fried, fatty foods, citrus juices, caffeine  Prescription refilled for Omeprazole 20 mg to take 1 capsule daily

## 2018-09-28 NOTE — PROGRESS NOTES
Chief Complaint   Patient presents with    Cold Like Symptoms     Symptoms discharge of eyes/redness for 2-3 days, coughing-some production, sore throat, heacache  Assessment/Plan:    Acute upper respiratory infection  Patient was recommended to increase fluid intake, take Mucinex 600 mg 1 tablet twice daily for cough  Prescription given for Z-George for 5 days  Call office if symptoms persist or worsen  Bacterial conjunctivitis of both eyes  Use Tobramycin 0 3% eye drops for 3- 5 days  Chronic GERD  Avoid fried, fatty foods, citrus juices, caffeine  Prescription refilled for Omeprazole 20 mg to take 1 capsule daily  CLL (chronic lymphocytic leukemia) ([facility] )  Management per hematology - oncology Dr Sam Kwok  Diagnoses and all orders for this visit:    Acute upper respiratory infection  -     azithromycin (ZITHROMAX) 250 mg tablet; Take 2 tablets 1 st day, then 1 tablet daily for 4 days, then stop  -     guaiFENesin (MUCINEX) 600 mg 12 hr tablet; Take 2 tablets (1,200 mg total) by mouth every 12 (twelve) hours    Bacterial conjunctivitis of both eyes  -     tobramycin (TOBREX) 0 3 % SOLN; Use 2 drops 4 times daily for 3-5 days    Chronic GERD  -     omeprazole (PriLOSEC) 20 mg delayed release capsule; Take 1 capsule (20 mg total) by mouth daily    CLL (chronic lymphocytic leukemia) ([facility])          Subjective:      Patient ID: Kari Woody is a 59 y o  male  HPI     Patient presents to the office c/o productive cough, sore throat, chest congestion fot the last few days  Took NyQuil with no significant relief in symptoms  C/o redness of both eyes, crusty discharge from left eye for the last 2 days  Denies fever, chills  No N/V/D  Patient has chronic lymphocytic leukemia  He has been followed by hematology- oncology Dr Sam Kwok, last seen in August this year  Patient is not on any treatment at the present time  Denies tobacco use  Patient has chronic reflux disease      He takes Omeprazole 20 mg 1 capsule daily  Denies abdominal pain  The following portions of the patient's history were reviewed and updated as appropriate: current medications, past family history, past medical history, past social history, past surgical history and problem list     Review of Systems   Constitutional: Positive for fatigue  Negative for activity change, appetite change, chills and fever  HENT: Positive for congestion and sore throat  Negative for ear pain, nosebleeds, tinnitus and trouble swallowing  Eyes: Positive for discharge (crusty yellowish discharge from L eye) and redness  Negative for pain, itching and visual disturbance  Respiratory: Positive for cough (productive cough)  Negative for chest tightness, shortness of breath and wheezing  Cardiovascular: Negative for chest pain, palpitations and leg swelling  Gastrointestinal: Negative for abdominal pain, blood in stool, constipation, diarrhea, nausea and vomiting  Occasional heartburn   Genitourinary: Negative for difficulty urinating, dysuria, flank pain, frequency and hematuria  Musculoskeletal: Negative for arthralgias, back pain, joint swelling and myalgias  Skin: Negative for rash and wound  Neurological: Negative for dizziness and headaches  Hematological: Negative  Objective:      /62 (BP Location: Left arm, Patient Position: Sitting, Cuff Size: Standard)   Pulse 76   Temp 97 7 °F (36 5 °C) (Tympanic)   Resp 16   Wt 74 5 kg (164 lb 3 2 oz)   BMI 24 11 kg/m²          Physical Exam   Constitutional: He appears well-developed and well-nourished  No distress  HENT:   Head: Normocephalic and atraumatic  Right Ear: External ear normal    Left Ear: External ear normal    Pharynx: erythema, no exudate  Nasal mucosa is red, swollen  Eyes: Pupils are equal, round, and reactive to light  BL conjunctival erythema, L >R   Neck: Normal range of motion  Neck supple     Cardiovascular: Normal rate, regular rhythm and normal heart sounds  No murmur heard  No BL LE edema   Pulmonary/Chest: Effort normal and breath sounds normal  He has no wheezes  He has no rales  Coarse breath sounds BL   Abdominal: Soft  Bowel sounds are normal  There is no tenderness  Musculoskeletal: Normal range of motion  He exhibits no edema, tenderness or deformity  Lymphadenopathy:     He has no cervical adenopathy  Skin: Skin is warm and dry  No rash noted  Nursing note and vitals reviewed

## 2018-09-28 NOTE — ASSESSMENT & PLAN NOTE
Patient was recommended to increase fluid intake, take Mucinex 600 mg 1 tablet twice daily for cough  Prescription given for Z-George for 5 days  Call office if symptoms persist or worsen

## 2018-11-06 NOTE — TELEPHONE ENCOUNTER
Requesting      Finasteride  5mg, (Pt requesting 90 day              Supply)  Refills 3,  take one fourth (1/4) tablet once daily  Send to [facility] said, reason for medication, he has started losing his hair once again

## 2018-11-06 NOTE — TELEPHONE ENCOUNTER
Please call patient  Finasteride is usually prescribed by urologist     Check if patient was seen previously by urologist   Check the name

## 2018-11-06 NOTE — TELEPHONE ENCOUNTER
Patient does not see a Urologist  He was prescribed that by Dr Rebecca Griggs about 8-10 years ago for hair loss  Dr Cherelle Sainz think's he should be seen by Dermatology  Patient asked me to schedule him at one and let him know when/where

## 2018-11-07 NOTE — TELEPHONE ENCOUNTER
Called Chao Crabtree, and then Dr Larkin Boxer  Scheduled patient with Dr Larkin Boxer for Friday, 11/30/18, 10:30am to be evaluated  Made patient aware

## 2019-01-28 ENCOUNTER — APPOINTMENT (OUTPATIENT)
Dept: LAB | Facility: HOSPITAL | Age: 59
End: 2019-01-28
Attending: INTERNAL MEDICINE
Payer: COMMERCIAL

## 2019-01-28 DIAGNOSIS — C91.10 CLL (CHRONIC LYMPHOCYTIC LEUKEMIA) (HCC): ICD-10-CM

## 2019-01-28 DIAGNOSIS — C91.10 CHRONIC LYMPHOCYTIC LEUKEMIA OF B-CELL TYPE NOT HAVING ACHIEVED REMISSION (HCC): ICD-10-CM

## 2019-01-28 LAB
ALBUMIN SERPL BCP-MCNC: 4.3 G/DL (ref 3.5–5)
ALP SERPL-CCNC: 112 U/L (ref 46–116)
ALT SERPL W P-5'-P-CCNC: 34 U/L (ref 12–78)
ANION GAP SERPL CALCULATED.3IONS-SCNC: 6 MMOL/L (ref 4–13)
AST SERPL W P-5'-P-CCNC: 25 U/L (ref 5–45)
BASOPHILS # BLD MANUAL: 0 THOUSAND/UL (ref 0–0.1)
BASOPHILS NFR MAR MANUAL: 0 % (ref 0–1)
BILIRUB SERPL-MCNC: 0.6 MG/DL (ref 0.2–1)
BUN SERPL-MCNC: 20 MG/DL (ref 5–25)
CALCIUM SERPL-MCNC: 8.7 MG/DL (ref 8.3–10.1)
CHLORIDE SERPL-SCNC: 103 MMOL/L (ref 100–108)
CO2 SERPL-SCNC: 31 MMOL/L (ref 21–32)
CREAT SERPL-MCNC: 1.09 MG/DL (ref 0.6–1.3)
EOSINOPHIL # BLD MANUAL: 0 THOUSAND/UL (ref 0–0.4)
EOSINOPHIL NFR BLD MANUAL: 0 % (ref 0–6)
ERYTHROCYTE [DISTWIDTH] IN BLOOD BY AUTOMATED COUNT: 13.5 % (ref 11.6–15.1)
GFR SERPL CREATININE-BSD FRML MDRD: 73 ML/MIN/1.73SQ M
GLUCOSE SERPL-MCNC: 86 MG/DL (ref 65–140)
HCT VFR BLD AUTO: 43.5 % (ref 36.5–49.3)
HGB BLD-MCNC: 14.4 G/DL (ref 12–17)
LDH SERPL-CCNC: 183 U/L (ref 81–234)
LG PLATELETS BLD QL SMEAR: PRESENT
LYMPHOCYTES # BLD AUTO: 12.32 THOUSAND/UL (ref 0.6–4.47)
LYMPHOCYTES # BLD AUTO: 25 % (ref 14–44)
MCH RBC QN AUTO: 31.1 PG (ref 26.8–34.3)
MCHC RBC AUTO-ENTMCNC: 33.1 G/DL (ref 31.4–37.4)
MCV RBC AUTO: 94 FL (ref 82–98)
MONOCYTES # BLD AUTO: 1.97 THOUSAND/UL (ref 0–1.22)
MONOCYTES NFR BLD: 4 % (ref 4–12)
NEUTROPHILS # BLD MANUAL: 5.91 THOUSAND/UL (ref 1.85–7.62)
NEUTS SEG NFR BLD AUTO: 12 % (ref 43–75)
NRBC BLD AUTO-RTO: 0 /100 WBCS
PLATELET # BLD AUTO: 144 THOUSANDS/UL (ref 149–390)
PLATELET BLD QL SMEAR: ABNORMAL
PMV BLD AUTO: 9.7 FL (ref 8.9–12.7)
POTASSIUM SERPL-SCNC: 4.3 MMOL/L (ref 3.5–5.3)
PROT SERPL-MCNC: 7.1 G/DL (ref 6.4–8.2)
RBC # BLD AUTO: 4.63 MILLION/UL (ref 3.88–5.62)
RBC MORPH BLD: NORMAL
SODIUM SERPL-SCNC: 140 MMOL/L (ref 136–145)
TOTAL CELLS COUNTED SPEC: 100
VARIANT LYMPHS # BLD AUTO: 59 %
WBC # BLD AUTO: 49.26 THOUSAND/UL (ref 4.31–10.16)

## 2019-01-28 PROCEDURE — 85027 COMPLETE CBC AUTOMATED: CPT

## 2019-01-28 PROCEDURE — 36415 COLL VENOUS BLD VENIPUNCTURE: CPT

## 2019-01-28 PROCEDURE — 85007 BL SMEAR W/DIFF WBC COUNT: CPT

## 2019-01-28 PROCEDURE — 80053 COMPREHEN METABOLIC PANEL: CPT

## 2019-01-28 PROCEDURE — 83615 LACTATE (LD) (LDH) ENZYME: CPT

## 2019-02-06 ENCOUNTER — OFFICE VISIT (OUTPATIENT)
Dept: HEMATOLOGY ONCOLOGY | Facility: HOSPITAL | Age: 59
End: 2019-02-06
Payer: COMMERCIAL

## 2019-02-06 VITALS
HEIGHT: 70 IN | DIASTOLIC BLOOD PRESSURE: 64 MMHG | TEMPERATURE: 98.9 F | WEIGHT: 169.4 LBS | HEART RATE: 84 BPM | BODY MASS INDEX: 24.25 KG/M2 | OXYGEN SATURATION: 97 % | SYSTOLIC BLOOD PRESSURE: 122 MMHG | RESPIRATION RATE: 18 BRPM

## 2019-02-06 DIAGNOSIS — C91.10 CLL (CHRONIC LYMPHOCYTIC LEUKEMIA) (HCC): Primary | ICD-10-CM

## 2019-02-06 PROCEDURE — 99214 OFFICE O/P EST MOD 30 MIN: CPT | Performed by: INTERNAL MEDICINE

## 2019-02-06 RX ORDER — FINASTERIDE 1 MG/1
1 TABLET, FILM COATED ORAL DAILY
COMMUNITY

## 2019-02-06 NOTE — PROGRESS NOTES
Hematology/Oncology Outpatient Follow- up Note  Ada Rosales 62 y o  male MRN: @ Encounter: [id number]        Date:  2/6/2019    Presenting Complaint/Diagnosis :     Elevated white count with lymphocytosis  Workup revealed CLL      Previous Hematologic/ Oncologic History:      Workup    Current Hematologic/ Oncologic Treatment:      Observation    Interval History:      The patient returns for follow-up visit  He states he is doing well  His white count has gone up to 49  He is at baseline  Denies any nausea nausea vomiting as the diarrhea  Very occasionally feels slightly warm at night and After he works out but denies any drenching night sweats, weight loss or fatigue  Denies any lymphadenopathy  Denies any nausea or vomiting  He does have an upper respiratory tract infection which he states he is recovering fine from  Denies any fevers  Denies any productive cough  Just has some nasal stuffiness left  The rest of his 14 point review of systems today was negative  Test Results:    Imaging: No results found  Labs:   Lab Results   Component Value Date    WBC 49 26 (HH) 01/28/2019    HGB 14 4 01/28/2019    HCT 43 5 01/28/2019    MCV 94 01/28/2019     (L) 01/28/2019     Lab Results   Component Value Date     03/17/2017    K 4 3 01/28/2019     01/28/2019    CO2 31 01/28/2019    BUN 20 01/28/2019    CREATININE 1 09 01/28/2019    GLUF 75 08/21/2018    CALCIUM 8 7 01/28/2019    AST 25 01/28/2019    ALT 34 01/28/2019    ALKPHOS 112 01/28/2019    PROT 6 2 03/17/2017    BILITOT 1 3 (H) 03/17/2017    EGFR 73 01/28/2019       ROS: As stated in the history of present illness otherwise his 14 point review of systems today was negative        Active Problems:   Patient Active Problem List   Diagnosis    Acute upper respiratory infection    Chronic GERD    Bacterial conjunctivitis of both eyes    CLL (chronic lymphocytic leukemia) (HCC)    Lymphocytosis       Past Medical History:   Past Medical History:   Diagnosis Date    Abdominal pain     Epigastric pain     Gallbladder polyp     Leukocytosis     Lymphocytosis     Panic attacks     years ago    Rosacea        Surgical History:   Past Surgical History:   Procedure Laterality Date    HEMORRHOID SURGERY      [place] COLONOSCOPY FLX DX W/COLLJ SPEC WHEN PFRMD N/A 4/11/2016    Procedure: COLONOSCOPY;  Surgeon: Joselin Hawkins MD;  Location:  GI LAB; Service: Colorectal    DE ESOPHAGOGASTRODUODENOSCOPY TRANSORAL DIAGNOSTIC N/A 3/31/2017    Procedure: ESOPHAGOGASTRODUODENOSCOPY (EGD); Surgeon: Bart Langford MD;  Location: [facility] GI LAB; Service: Gastroenterology       Family History:  Noncontributory      Social History:   Social History     Social History    Marital status: Single     Spouse name: N/A    Number of children: N/A    Years of education: N/A     Occupational History    Not on file  Social History Main Topics    Smoking status: Never Smoker    Smokeless tobacco: Never Used    Alcohol use Yes      Comment: socially    Drug use: No    Sexual activity: Not on file     Other Topics Concern    Not on file     Social History Narrative    No narrative on file       Current Medications:   Current Outpatient Prescriptions   Medication Sig Dispense Refill    Alum Hydroxide-Mag Carbonate (GAVISCON EXTRA STRENGTH) 788-065 MG/10ML SUSP Take by mouth      finasteride (PROPECIA) 1 MG tablet Take 1 mg by mouth daily      omeprazole (PriLOSEC) 20 mg delayed release capsule Take 1 capsule (20 mg total) by mouth daily 90 capsule 3    aspirin 81 MG tablet Take 81 mg by mouth      guaiFENesin (MUCINEX) 600 mg 12 hr tablet Take 2 tablets (1,200 mg total) by mouth every 12 (twelve) hours (Patient not taking: Reported on 2/6/2019 ) 14 tablet 0    tobramycin (TOBREX) 0 3 % SOLN Use 2 drops 4 times daily for 3-5 days (Patient not taking: Reported on 2/6/2019 ) 1 Bottle 0     No current facility-administered medications for this visit  Allergies: No Known Allergies    Physical Exam:    Body surface area is 1 95 meters squared  Wt Readings from Last 3 Encounters:   02/06/19 76 8 kg (169 lb 6 4 oz)   09/28/18 74 5 kg (164 lb 3 2 oz)   08/29/18 75 8 kg (167 lb)        Temp Readings from Last 3 Encounters:   02/06/19 98 9 °F (37 2 °C) (Tympanic Core)   09/28/18 97 7 °F (36 5 °C) (Tympanic)   08/29/18 (!) 97 1 °F (36 2 °C) (Tympanic)        BP Readings from Last 3 Encounters:   02/06/19 122/64   09/28/18 110/62   08/29/18 100/68         Pulse Readings from Last 3 Encounters:   02/06/19 84   09/28/18 76   08/29/18 75        Physical Exam     Constitutional   General appearance: No acute distress, well appearing and well nourished  Eyes   Conjunctiva and lids: No swelling, erythema or discharge  Pupils and irises: Equal, round and reactive to light  Ears, Nose, Mouth, and Throat   External inspection of ears and nose: Normal     Nasal mucosa, septum, and turbinates: Normal without edema or erythema  Oropharynx: Normal with no erythema, edema, exudate or lesions  Pulmonary   Respiratory effort: No increased work of breathing or signs of respiratory distress  Auscultation of lungs: Clear to auscultation  Cardiovascular   Palpation of heart: Normal PMI, no thrills  Auscultation of heart: Normal rate and rhythm, normal S1 and S2, without murmurs  Examination of extremities for edema and/or varicosities: Normal     Carotid pulses: Normal     Abdomen   Abdomen: Non-tender, no masses  Liver and spleen: No hepatomegaly or splenomegaly  Lymphatic   Palpation of lymph nodes in neck: No lymphadenopathy  Musculoskeletal   Gait and station: Normal     Digits and nails: Normal without clubbing or cyanosis  Inspection/palpation of joints, bones, and muscles: Normal     Skin   Skin and subcutaneous tissue: Normal without rashes or lesions  Neurologic   Cranial nerves: Cranial nerves 2-12 intact      Sensation: No sensory loss     Psychiatric   Orientation to person, place, and time: Normal     Mood and affect: Normal         Assessment / Plan: It is a pleasant 41-year-old male who was referred to see us for an elevated white count and lymphocytosis  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 63% of total events  He had lack of CD38 expression which is a favorable prognosis  He does not have splenomegaly  I'll see him back in 4-5 months with repeat blood work  We will keep an eye on his white count if it goes to around 100 we will treat him  As long as he does not have complications we will continue to follow  Goals and Barriers:  Current Goal:  Prolong Survival from CLL  Barriers: None  Patient's Capacity to Self Care:  Patient able to self care  Portions of the record may have been created with voice recognition software   Occasional wrong word or "sound a like" substitutions may have occurred due to the inherent limitations of voice recognition software   Read the chart carefully and recognize, using context, where substitutions have occurred

## 2019-02-22 ENCOUNTER — TELEPHONE (OUTPATIENT)
Dept: GYNECOLOGIC ONCOLOGY | Facility: CLINIC | Age: 59
End: 2019-02-22

## 2019-02-22 NOTE — TELEPHONE ENCOUNTER
Call from patient asking if its ok for him to have the series of shingles injection  He will get the 1st one in the next month and believes the follow up one is in 6 mos  Is this ok? Please call patient to advise

## 2019-02-28 NOTE — TELEPHONE ENCOUNTER
I spoke with patient and advised not to be done at this time  He is aware and verbalized understanding

## 2019-06-11 ENCOUNTER — TELEPHONE (OUTPATIENT)
Dept: HEMATOLOGY ONCOLOGY | Facility: CLINIC | Age: 59
End: 2019-06-11

## 2019-06-11 ENCOUNTER — APPOINTMENT (OUTPATIENT)
Dept: LAB | Facility: HOSPITAL | Age: 59
End: 2019-06-11
Attending: INTERNAL MEDICINE
Payer: COMMERCIAL

## 2019-06-11 DIAGNOSIS — C91.10 CLL (CHRONIC LYMPHOCYTIC LEUKEMIA) (HCC): ICD-10-CM

## 2019-06-11 LAB
ALBUMIN SERPL BCP-MCNC: 4.1 G/DL (ref 3.5–5)
ALP SERPL-CCNC: 100 U/L (ref 46–116)
ALT SERPL W P-5'-P-CCNC: 35 U/L (ref 12–78)
ANION GAP SERPL CALCULATED.3IONS-SCNC: 2 MMOL/L (ref 4–13)
AST SERPL W P-5'-P-CCNC: 23 U/L (ref 5–45)
BASOPHILS # BLD MANUAL: 0 THOUSAND/UL (ref 0–0.1)
BASOPHILS NFR MAR MANUAL: 0 % (ref 0–1)
BILIRUB SERPL-MCNC: 1.07 MG/DL (ref 0.2–1)
BUN SERPL-MCNC: 17 MG/DL (ref 5–25)
CALCIUM SERPL-MCNC: 8.6 MG/DL (ref 8.3–10.1)
CHLORIDE SERPL-SCNC: 106 MMOL/L (ref 100–108)
CO2 SERPL-SCNC: 31 MMOL/L (ref 21–32)
CREAT SERPL-MCNC: 1.1 MG/DL (ref 0.6–1.3)
EOSINOPHIL # BLD MANUAL: 0 THOUSAND/UL (ref 0–0.4)
EOSINOPHIL NFR BLD MANUAL: 0 % (ref 0–6)
ERYTHROCYTE [DISTWIDTH] IN BLOOD BY AUTOMATED COUNT: 14.2 % (ref 11.6–15.1)
GFR SERPL CREATININE-BSD FRML MDRD: 72 ML/MIN/1.73SQ M
GLUCOSE P FAST SERPL-MCNC: 90 MG/DL (ref 65–99)
HCT VFR BLD AUTO: 42.3 % (ref 36.5–49.3)
HGB BLD-MCNC: 13.4 G/DL (ref 12–17)
LDH SERPL-CCNC: 202 U/L (ref 81–234)
LYMPHOCYTES # BLD AUTO: 17.02 THOUSAND/UL (ref 0.6–4.47)
LYMPHOCYTES # BLD AUTO: 33 % (ref 14–44)
MCH RBC QN AUTO: 29.8 PG (ref 26.8–34.3)
MCHC RBC AUTO-ENTMCNC: 31.7 G/DL (ref 31.4–37.4)
MCV RBC AUTO: 94 FL (ref 82–98)
MONOCYTES # BLD AUTO: 2.58 THOUSAND/UL (ref 0–1.22)
MONOCYTES NFR BLD: 5 % (ref 4–12)
NEUTROPHILS # BLD MANUAL: 4.64 THOUSAND/UL (ref 1.85–7.62)
NEUTS SEG NFR BLD AUTO: 9 % (ref 43–75)
NRBC BLD AUTO-RTO: 0 /100 WBCS
PLATELET # BLD AUTO: 155 THOUSANDS/UL (ref 149–390)
PLATELET BLD QL SMEAR: ADEQUATE
PMV BLD AUTO: 9.9 FL (ref 8.9–12.7)
POTASSIUM SERPL-SCNC: 5.2 MMOL/L (ref 3.5–5.3)
PROT SERPL-MCNC: 6.6 G/DL (ref 6.4–8.2)
RBC # BLD AUTO: 4.49 MILLION/UL (ref 3.88–5.62)
SMUDGE CELLS BLD QL SMEAR: PRESENT
SODIUM SERPL-SCNC: 139 MMOL/L (ref 136–145)
TOTAL CELLS COUNTED SPEC: 100
VARIANT LYMPHS # BLD AUTO: 53 %
WBC # BLD AUTO: 51.59 THOUSAND/UL (ref 4.31–10.16)

## 2019-06-11 PROCEDURE — 85007 BL SMEAR W/DIFF WBC COUNT: CPT

## 2019-06-11 PROCEDURE — 85027 COMPLETE CBC AUTOMATED: CPT

## 2019-06-11 PROCEDURE — 83615 LACTATE (LD) (LDH) ENZYME: CPT

## 2019-06-11 PROCEDURE — 36415 COLL VENOUS BLD VENIPUNCTURE: CPT

## 2019-06-11 PROCEDURE — 80053 COMPREHEN METABOLIC PANEL: CPT

## 2019-06-17 ENCOUNTER — TELEPHONE (OUTPATIENT)
Dept: HEMATOLOGY ONCOLOGY | Facility: CLINIC | Age: 59
End: 2019-06-17

## 2019-06-19 ENCOUNTER — OFFICE VISIT (OUTPATIENT)
Dept: HEMATOLOGY ONCOLOGY | Facility: HOSPITAL | Age: 59
End: 2019-06-19
Payer: COMMERCIAL

## 2019-06-19 VITALS
HEART RATE: 71 BPM | DIASTOLIC BLOOD PRESSURE: 66 MMHG | RESPIRATION RATE: 16 BRPM | HEIGHT: 69 IN | OXYGEN SATURATION: 97 % | TEMPERATURE: 97.1 F | BODY MASS INDEX: 25.18 KG/M2 | WEIGHT: 170 LBS | SYSTOLIC BLOOD PRESSURE: 122 MMHG

## 2019-06-19 DIAGNOSIS — C91.10 CLL (CHRONIC LYMPHOCYTIC LEUKEMIA) (HCC): Primary | ICD-10-CM

## 2019-06-19 PROCEDURE — 99214 OFFICE O/P EST MOD 30 MIN: CPT | Performed by: INTERNAL MEDICINE

## 2019-10-07 ENCOUNTER — OFFICE VISIT (OUTPATIENT)
Dept: FAMILY MEDICINE CLINIC | Facility: CLINIC | Age: 59
End: 2019-10-07
Payer: COMMERCIAL

## 2019-10-07 ENCOUNTER — LAB (OUTPATIENT)
Dept: LAB | Facility: HOSPITAL | Age: 59
End: 2019-10-07
Payer: COMMERCIAL

## 2019-10-07 VITALS
SYSTOLIC BLOOD PRESSURE: 112 MMHG | WEIGHT: 161 LBS | TEMPERATURE: 100.2 F | HEART RATE: 90 BPM | DIASTOLIC BLOOD PRESSURE: 64 MMHG | HEIGHT: 69 IN | OXYGEN SATURATION: 97 % | RESPIRATION RATE: 14 BRPM | BODY MASS INDEX: 23.85 KG/M2

## 2019-10-07 DIAGNOSIS — C91.10 CLL (CHRONIC LYMPHOCYTIC LEUKEMIA) (HCC): ICD-10-CM

## 2019-10-07 DIAGNOSIS — I88.9 AXILLARY LYMPHADENITIS: ICD-10-CM

## 2019-10-07 DIAGNOSIS — I88.9 AXILLARY LYMPHADENITIS: Primary | ICD-10-CM

## 2019-10-07 PROBLEM — B96.89 BACTERIAL CONJUNCTIVITIS OF BOTH EYES: Status: RESOLVED | Noted: 2018-09-28 | Resolved: 2019-10-07

## 2019-10-07 PROBLEM — H10.9 BACTERIAL CONJUNCTIVITIS OF BOTH EYES: Status: RESOLVED | Noted: 2018-09-28 | Resolved: 2019-10-07

## 2019-10-07 LAB
ALBUMIN SERPL BCP-MCNC: 4.3 G/DL (ref 3.5–5)
ALP SERPL-CCNC: 102 U/L (ref 46–116)
ALT SERPL W P-5'-P-CCNC: 26 U/L (ref 12–78)
ANION GAP SERPL CALCULATED.3IONS-SCNC: 3 MMOL/L (ref 4–13)
AST SERPL W P-5'-P-CCNC: 21 U/L (ref 5–45)
BASOPHILS # BLD MANUAL: 0 THOUSAND/UL (ref 0–0.1)
BASOPHILS NFR MAR MANUAL: 0 % (ref 0–1)
BILIRUB SERPL-MCNC: 1.67 MG/DL (ref 0.2–1)
BUN SERPL-MCNC: 13 MG/DL (ref 5–25)
CALCIUM SERPL-MCNC: 8.5 MG/DL (ref 8.3–10.1)
CHLORIDE SERPL-SCNC: 106 MMOL/L (ref 100–108)
CO2 SERPL-SCNC: 30 MMOL/L (ref 21–32)
CREAT SERPL-MCNC: 1.16 MG/DL (ref 0.6–1.3)
EOSINOPHIL # BLD MANUAL: 0 THOUSAND/UL (ref 0–0.4)
EOSINOPHIL NFR BLD MANUAL: 0 % (ref 0–6)
ERYTHROCYTE [DISTWIDTH] IN BLOOD BY AUTOMATED COUNT: 14.2 % (ref 11.6–15.1)
GFR SERPL CREATININE-BSD FRML MDRD: 68 ML/MIN/1.73SQ M
GLUCOSE SERPL-MCNC: 94 MG/DL (ref 65–140)
HCT VFR BLD AUTO: 39.7 % (ref 36.5–49.3)
HGB BLD-MCNC: 12.5 G/DL (ref 12–17)
LYMPHOCYTES # BLD AUTO: 25.05 THOUSAND/UL (ref 0.6–4.47)
LYMPHOCYTES # BLD AUTO: 42 % (ref 14–44)
MCH RBC QN AUTO: 30.4 PG (ref 26.8–34.3)
MCHC RBC AUTO-ENTMCNC: 31.5 G/DL (ref 31.4–37.4)
MCV RBC AUTO: 97 FL (ref 82–98)
MONOCYTES # BLD AUTO: 2.39 THOUSAND/UL (ref 0–1.22)
MONOCYTES NFR BLD: 4 % (ref 4–12)
NEUTROPHILS # BLD MANUAL: 7.16 THOUSAND/UL (ref 1.85–7.62)
NEUTS SEG NFR BLD AUTO: 12 % (ref 43–75)
NRBC BLD AUTO-RTO: 0 /100 WBCS
PLATELET # BLD AUTO: 150 THOUSANDS/UL (ref 149–390)
PLATELET BLD QL SMEAR: ADEQUATE
PMV BLD AUTO: 10.6 FL (ref 8.9–12.7)
POTASSIUM SERPL-SCNC: 4.6 MMOL/L (ref 3.5–5.3)
PROT SERPL-MCNC: 6.7 G/DL (ref 6.4–8.2)
RBC # BLD AUTO: 4.11 MILLION/UL (ref 3.88–5.62)
RBC MORPH BLD: NORMAL
SODIUM SERPL-SCNC: 139 MMOL/L (ref 136–145)
TOTAL CELLS COUNTED SPEC: 100
VARIANT LYMPHS # BLD AUTO: 42 %
WBC # BLD AUTO: 59.64 THOUSAND/UL (ref 4.31–10.16)

## 2019-10-07 PROCEDURE — 80053 COMPREHEN METABOLIC PANEL: CPT

## 2019-10-07 PROCEDURE — 3008F BODY MASS INDEX DOCD: CPT | Performed by: FAMILY MEDICINE

## 2019-10-07 PROCEDURE — 85027 COMPLETE CBC AUTOMATED: CPT

## 2019-10-07 PROCEDURE — 36415 COLL VENOUS BLD VENIPUNCTURE: CPT

## 2019-10-07 PROCEDURE — 85007 BL SMEAR W/DIFF WBC COUNT: CPT

## 2019-10-07 PROCEDURE — 99214 OFFICE O/P EST MOD 30 MIN: CPT | Performed by: FAMILY MEDICINE

## 2019-10-07 RX ORDER — RANITIDINE HCL 75 MG
75 TABLET ORAL 2 TIMES DAILY
COMMUNITY
End: 2020-02-28

## 2019-10-07 RX ORDER — CEPHALEXIN 500 MG/1
500 CAPSULE ORAL EVERY 6 HOURS SCHEDULED
Qty: 40 CAPSULE | Refills: 0 | Status: ON HOLD | OUTPATIENT
Start: 2019-10-07 | End: 2019-10-12 | Stop reason: SDUPTHER

## 2019-10-07 NOTE — PROGRESS NOTES
Chief Complaint   Patient presents with    Mass     Left Axillary Area    Fever     Health Maintenance   Topic Date Due    Hepatitis C Screening  1960    Depression Screening PHQ  1960    Pneumococcal Vaccine: Pediatrics (0 to 5 Years) and At-Risk Patients (6 to 59 Years) (1 of 3 - PCV13) 01/14/1966    DTaP,Tdap,and Td Vaccines (2 - Td) 10/01/2018    INFLUENZA VACCINE  07/01/2019    BMI: Adult  06/19/2020    Pneumococcal Vaccine: 65+ Years (1 of 2 - PCV13) 01/14/2023    CRC Screening: Colonoscopy  06/12/2024    HEPATITIS B VACCINES  Aged Out     Assessment/Plan:    Axillary lymphadenitis  Start Cphalexin 500 mg 1 capsule 4 times daily for 7-10 days  Take Tylenol PRN for fever, headache  Recommended to stay well hydrated  Advised to go to the emergency room if symptoms persist or worsen  CLL (chronic lymphocytic leukemia) (HCC)  Check CBC with dif, CMP  Follow-up with hematology-oncology Dr Clemencia Walters  Diagnoses and all orders for this visit:    Axillary lymphadenitis  -     cephalexin (KEFLEX) 500 mg capsule; Take 1 capsule (500 mg total) by mouth every 6 (six) hours for 10 days  -     CBC and differential; Future  -     Comprehensive metabolic panel; Future    CLL (chronic lymphocytic leukemia) (HCC)  -     CBC and differential; Future    Other orders  -     ranitidine (ZANTAC) 75 MG tablet; Take 75 mg by mouth 2 (two) times a day          Subjective:      Patient ID: Gregor Habermann is a 62 y o  male  HPI     Patient presents c/o 2 painful red nodules in left axilla and small nodule in the right axilla which he developed on Saturday October 5, 2019  C/o fever, chills, body aches, headache, feeling tired  Took Advil yesterday  Denies cough, nasal congestion  No SOB  Patient has CLL  He has been followed by hematology- oncology Dr Clemencia Walters every 6 months, was seen in June 2019  Last blood test was done in June 2019  WBC 51 59 thousands      Denies tobacco use     The following portions of the patient's history were reviewed and updated as appropriate: allergies, past family history, past medical history, past social history, past surgical history and problem list     Review of Systems   Constitutional: Positive for fatigue  Negative for activity change, appetite change, chills and fever  HENT: Negative for congestion, ear pain, nosebleeds, sore throat, tinnitus and trouble swallowing  Eyes: Negative for pain, discharge, redness, itching and visual disturbance  Respiratory: Negative for cough, chest tightness, shortness of breath and wheezing  Cardiovascular: Negative for chest pain, palpitations and leg swelling  Gastrointestinal: Negative for abdominal pain, blood in stool, constipation, diarrhea, nausea and vomiting  Genitourinary: Negative for difficulty urinating, dysuria, flank pain, frequency and hematuria  Musculoskeletal: Positive for arthralgias and myalgias  Negative for back pain, joint swelling and neck pain  Skin: Negative for rash and wound  Red tender nodules in axillary ina   Neurological: Positive for headaches  Negative for dizziness and syncope  Hematological: Negative  Psychiatric/Behavioral: Negative  Objective:      /64 (BP Location: Left arm, Patient Position: Sitting, Cuff Size: Adult)   Pulse 90   Temp 100 2 °F (37 9 °C) (Tympanic)   Resp 14   Ht 5' 9" (1 753 m)   Wt 73 kg (161 lb)   SpO2 97%   BMI 23 78 kg/m²          Physical Exam   Constitutional: He appears well-nourished  No distress  HENT:   Head: Normocephalic and atraumatic  Right Ear: External ear normal    Left Ear: External ear normal    Nose: Nose normal    Mouth/Throat: Oropharynx is clear and moist    Eyes: Pupils are equal, round, and reactive to light  Conjunctivae are normal    Neck: Normal range of motion  Neck supple  Cardiovascular: Normal rate, regular rhythm and normal heart sounds  No murmur heard    No BL LE edema   Pulmonary/Chest: Effort normal and breath sounds normal    Abdominal: Soft  Bowel sounds are normal  There is no tenderness  Musculoskeletal: Normal range of motion  He exhibits no edema, tenderness or deformity  Lymphadenopathy:     He has no cervical adenopathy  Skin: Skin is warm and dry  No rash noted  Small 8 x 8 mm tender red nodule in R axilla and 2 tender erythematous nodules 1 5 x 1 5 cm in L axilla  Psychiatric: He has a normal mood and affect  Vitals reviewed

## 2019-10-07 NOTE — ASSESSMENT & PLAN NOTE
Start Cphalexin 500 mg 1 capsule 4 times daily for 7-10 days  Take Tylenol PRN for fever, headache  Recommended to stay well hydrated  Advised to go to the emergency room if symptoms persist or worsen

## 2019-10-09 ENCOUNTER — HOSPITAL ENCOUNTER (INPATIENT)
Facility: HOSPITAL | Age: 59
LOS: 1 days | Discharge: HOME | DRG: 815 | End: 2019-10-12
Attending: EMERGENCY MEDICINE | Admitting: INTERNAL MEDICINE
Payer: COMMERCIAL

## 2019-10-09 DIAGNOSIS — I88.9 AXILLARY LYMPHADENITIS: ICD-10-CM

## 2019-10-09 DIAGNOSIS — L03.112 CELLULITIS OF LEFT AXILLA: Primary | ICD-10-CM

## 2019-10-09 DIAGNOSIS — C91.10 CLL (CHRONIC LYMPHOCYTIC LEUKEMIA) (HCC): ICD-10-CM

## 2019-10-09 DIAGNOSIS — D72.820 LYMPHOCYTOSIS: ICD-10-CM

## 2019-10-09 LAB
ALBUMIN SERPL BCP-MCNC: 3.3 G/DL (ref 3.5–5)
ALP SERPL-CCNC: 88 U/L (ref 46–116)
ALT SERPL W P-5'-P-CCNC: 19 U/L (ref 12–78)
ANION GAP SERPL CALCULATED.3IONS-SCNC: 7 MMOL/L (ref 4–13)
AST SERPL W P-5'-P-CCNC: 12 U/L (ref 5–45)
BILIRUB SERPL-MCNC: 0.7 MG/DL (ref 0.2–1)
BUN SERPL-MCNC: 13 MG/DL (ref 5–25)
CALCIUM SERPL-MCNC: 8.8 MG/DL (ref 8.3–10.1)
CHLORIDE SERPL-SCNC: 108 MMOL/L (ref 100–108)
CO2 SERPL-SCNC: 26 MMOL/L (ref 21–32)
CREAT SERPL-MCNC: 1.06 MG/DL (ref 0.6–1.3)
GFR SERPL CREATININE-BSD FRML MDRD: 75 ML/MIN/1.73SQ M
GLUCOSE SERPL-MCNC: 119 MG/DL (ref 65–140)
PLATELET # BLD AUTO: 120 THOUSANDS/UL (ref 149–390)
PMV BLD AUTO: 10.5 FL (ref 8.9–12.7)
POTASSIUM SERPL-SCNC: 4.3 MMOL/L (ref 3.5–5.3)
PROT SERPL-MCNC: 6.5 G/DL (ref 6.4–8.2)
SODIUM SERPL-SCNC: 141 MMOL/L (ref 136–145)

## 2019-10-09 PROCEDURE — 85027 COMPLETE CBC AUTOMATED: CPT | Performed by: EMERGENCY MEDICINE

## 2019-10-09 PROCEDURE — 80053 COMPREHEN METABOLIC PANEL: CPT | Performed by: EMERGENCY MEDICINE

## 2019-10-09 PROCEDURE — 85049 AUTOMATED PLATELET COUNT: CPT | Performed by: INTERNAL MEDICINE

## 2019-10-09 PROCEDURE — 99284 EMERGENCY DEPT VISIT MOD MDM: CPT

## 2019-10-09 PROCEDURE — 99220 PR INITIAL OBSERVATION CARE/DAY 70 MINUTES: CPT | Performed by: INTERNAL MEDICINE

## 2019-10-09 PROCEDURE — 76882 US LMTD JT/FCL EVL NVASC XTR: CPT | Performed by: EMERGENCY MEDICINE

## 2019-10-09 PROCEDURE — 99285 EMERGENCY DEPT VISIT HI MDM: CPT | Performed by: EMERGENCY MEDICINE

## 2019-10-09 PROCEDURE — 85007 BL SMEAR W/DIFF WBC COUNT: CPT | Performed by: EMERGENCY MEDICINE

## 2019-10-09 PROCEDURE — 87040 BLOOD CULTURE FOR BACTERIA: CPT | Performed by: INTERNAL MEDICINE

## 2019-10-09 PROCEDURE — 96365 THER/PROPH/DIAG IV INF INIT: CPT

## 2019-10-09 PROCEDURE — 36415 COLL VENOUS BLD VENIPUNCTURE: CPT | Performed by: EMERGENCY MEDICINE

## 2019-10-09 PROCEDURE — 84145 PROCALCITONIN (PCT): CPT | Performed by: INTERNAL MEDICINE

## 2019-10-09 RX ORDER — MORPHINE SULFATE 4 MG/ML
4 INJECTION, SOLUTION INTRAMUSCULAR; INTRAVENOUS ONCE
Status: COMPLETED | OUTPATIENT
Start: 2019-10-09 | End: 2019-10-10

## 2019-10-09 RX ORDER — CLINDAMYCIN PHOSPHATE 600 MG/50ML
600 INJECTION INTRAVENOUS ONCE
Status: COMPLETED | OUTPATIENT
Start: 2019-10-09 | End: 2019-10-09

## 2019-10-09 RX ORDER — FINASTERIDE 1 MG/1
1 TABLET, FILM COATED ORAL DAILY
Status: DISCONTINUED | OUTPATIENT
Start: 2019-10-10 | End: 2019-10-09 | Stop reason: RX

## 2019-10-09 RX ORDER — CEFAZOLIN SODIUM 2 G/50ML
2000 SOLUTION INTRAVENOUS EVERY 8 HOURS
Status: DISCONTINUED | OUTPATIENT
Start: 2019-10-09 | End: 2019-10-12

## 2019-10-09 RX ORDER — MAGNESIUM HYDROXIDE/ALUMINUM HYDROXICE/SIMETHICONE 120; 1200; 1200 MG/30ML; MG/30ML; MG/30ML
5 SUSPENSION ORAL EVERY 4 HOURS PRN
Status: DISCONTINUED | OUTPATIENT
Start: 2019-10-09 | End: 2019-10-12 | Stop reason: HOSPADM

## 2019-10-09 RX ORDER — PANTOPRAZOLE SODIUM 40 MG/1
40 TABLET, DELAYED RELEASE ORAL
Status: DISCONTINUED | OUTPATIENT
Start: 2019-10-10 | End: 2019-10-12 | Stop reason: HOSPADM

## 2019-10-09 RX ORDER — SODIUM CHLORIDE 9 MG/ML
50 INJECTION, SOLUTION INTRAVENOUS CONTINUOUS
Status: DISCONTINUED | OUTPATIENT
Start: 2019-10-09 | End: 2019-10-11

## 2019-10-09 RX ADMIN — CLINDAMYCIN IN 5 PERCENT DEXTROSE 600 MG: 12 INJECTION, SOLUTION INTRAVENOUS at 20:43

## 2019-10-09 RX ADMIN — CEFAZOLIN SODIUM 2000 MG: 2 SOLUTION INTRAVENOUS at 23:57

## 2019-10-09 RX ADMIN — SODIUM CHLORIDE 50 ML/HR: 0.9 INJECTION, SOLUTION INTRAVENOUS at 23:43

## 2019-10-10 LAB
ALBUMIN SERPL BCP-MCNC: 3.2 G/DL (ref 3.5–5)
ALP SERPL-CCNC: 78 U/L (ref 46–116)
ALT SERPL W P-5'-P-CCNC: 16 U/L (ref 12–78)
ANION GAP SERPL CALCULATED.3IONS-SCNC: 5 MMOL/L (ref 4–13)
ANISOCYTOSIS BLD QL SMEAR: PRESENT
AST SERPL W P-5'-P-CCNC: 10 U/L (ref 5–45)
BASOPHILS # BLD MANUAL: 0 THOUSAND/UL (ref 0–0.1)
BASOPHILS # BLD MANUAL: 0.39 THOUSAND/UL (ref 0–0.1)
BASOPHILS NFR MAR MANUAL: 0 % (ref 0–1)
BASOPHILS NFR MAR MANUAL: 1 % (ref 0–1)
BILIRUB SERPL-MCNC: 0.7 MG/DL (ref 0.2–1)
BUN SERPL-MCNC: 13 MG/DL (ref 5–25)
CALCIUM SERPL-MCNC: 8.5 MG/DL (ref 8.3–10.1)
CHLORIDE SERPL-SCNC: 109 MMOL/L (ref 100–108)
CO2 SERPL-SCNC: 29 MMOL/L (ref 21–32)
CREAT SERPL-MCNC: 1.03 MG/DL (ref 0.6–1.3)
EOSINOPHIL # BLD MANUAL: 0 THOUSAND/UL (ref 0–0.4)
EOSINOPHIL # BLD MANUAL: 1.02 THOUSAND/UL (ref 0–0.4)
EOSINOPHIL NFR BLD MANUAL: 0 % (ref 0–6)
EOSINOPHIL NFR BLD MANUAL: 2 % (ref 0–6)
ERYTHROCYTE [DISTWIDTH] IN BLOOD BY AUTOMATED COUNT: 14.1 % (ref 11.6–15.1)
ERYTHROCYTE [DISTWIDTH] IN BLOOD BY AUTOMATED COUNT: 14.2 % (ref 11.6–15.1)
GFR SERPL CREATININE-BSD FRML MDRD: 78 ML/MIN/1.73SQ M
GLUCOSE P FAST SERPL-MCNC: 87 MG/DL (ref 65–99)
GLUCOSE SERPL-MCNC: 87 MG/DL (ref 65–140)
HCT VFR BLD AUTO: 33.7 % (ref 36.5–49.3)
HCT VFR BLD AUTO: 35.8 % (ref 36.5–49.3)
HGB BLD-MCNC: 10.7 G/DL (ref 12–17)
HGB BLD-MCNC: 11.6 G/DL (ref 12–17)
LYMPHOCYTES # BLD AUTO: 26.92 THOUSAND/UL (ref 0.6–4.47)
LYMPHOCYTES # BLD AUTO: 32.19 THOUSAND/UL (ref 0.6–4.47)
LYMPHOCYTES # BLD AUTO: 53 % (ref 14–44)
LYMPHOCYTES # BLD AUTO: 82 % (ref 14–44)
MAGNESIUM SERPL-MCNC: 1.9 MG/DL (ref 1.6–2.6)
MCH RBC QN AUTO: 30.7 PG (ref 26.8–34.3)
MCH RBC QN AUTO: 31.2 PG (ref 26.8–34.3)
MCHC RBC AUTO-ENTMCNC: 31.8 G/DL (ref 31.4–37.4)
MCHC RBC AUTO-ENTMCNC: 32.4 G/DL (ref 31.4–37.4)
MCV RBC AUTO: 96 FL (ref 82–98)
MCV RBC AUTO: 97 FL (ref 82–98)
MONOCYTES # BLD AUTO: 0 THOUSAND/UL (ref 0–1.22)
MONOCYTES # BLD AUTO: 3.05 THOUSAND/UL (ref 0–1.22)
MONOCYTES NFR BLD: 0 % (ref 4–12)
MONOCYTES NFR BLD: 6 % (ref 4–12)
NEUTROPHILS # BLD MANUAL: 5.5 THOUSAND/UL (ref 1.85–7.62)
NEUTROPHILS # BLD MANUAL: 9.65 THOUSAND/UL (ref 1.85–7.62)
NEUTS SEG NFR BLD AUTO: 14 % (ref 43–75)
NEUTS SEG NFR BLD AUTO: 19 % (ref 43–75)
NRBC BLD AUTO-RTO: 0 /100 WBCS
NRBC BLD AUTO-RTO: 0 /100 WBCS
PATHOLOGIST INTERPRETATION: NORMAL
PATHOLOGY REVIEW: YES
PHOSPHATE SERPL-MCNC: 3.5 MG/DL (ref 2.3–4.1)
PLATELET # BLD AUTO: 120 THOUSANDS/UL (ref 149–390)
PLATELET # BLD AUTO: 126 THOUSANDS/UL (ref 149–390)
PLATELET BLD QL SMEAR: ABNORMAL
PLATELET BLD QL SMEAR: ABNORMAL
PMV BLD AUTO: 10.8 FL (ref 8.9–12.7)
PMV BLD AUTO: 11 FL (ref 8.9–12.7)
POIKILOCYTOSIS BLD QL SMEAR: PRESENT
POTASSIUM SERPL-SCNC: 4.3 MMOL/L (ref 3.5–5.3)
PROCALCITONIN SERPL-MCNC: 0.22 NG/ML
PROCALCITONIN SERPL-MCNC: 0.22 NG/ML
PROT SERPL-MCNC: 6.1 G/DL (ref 6.4–8.2)
RBC # BLD AUTO: 3.49 MILLION/UL (ref 3.88–5.62)
RBC # BLD AUTO: 3.72 MILLION/UL (ref 3.88–5.62)
RBC MORPH BLD: PRESENT
SMUDGE CELLS BLD QL SMEAR: PRESENT
SMUDGE CELLS BLD QL SMEAR: PRESENT
SODIUM SERPL-SCNC: 143 MMOL/L (ref 136–145)
TOTAL CELLS COUNTED SPEC: 100
URATE SERPL-MCNC: 4.3 MG/DL (ref 4.2–8)
VARIANT LYMPHS # BLD AUTO: 20 %
VARIANT LYMPHS # BLD AUTO: 3 %
WBC # BLD AUTO: 39.26 THOUSAND/UL (ref 4.31–10.16)
WBC # BLD AUTO: 50.79 THOUSAND/UL (ref 4.31–10.16)

## 2019-10-10 PROCEDURE — 83735 ASSAY OF MAGNESIUM: CPT | Performed by: INTERNAL MEDICINE

## 2019-10-10 PROCEDURE — 84550 ASSAY OF BLOOD/URIC ACID: CPT | Performed by: INTERNAL MEDICINE

## 2019-10-10 PROCEDURE — 84145 PROCALCITONIN (PCT): CPT | Performed by: INTERNAL MEDICINE

## 2019-10-10 PROCEDURE — 84100 ASSAY OF PHOSPHORUS: CPT | Performed by: INTERNAL MEDICINE

## 2019-10-10 PROCEDURE — 99225 PR SBSQ OBSERVATION CARE/DAY 25 MINUTES: CPT | Performed by: PHYSICIAN ASSISTANT

## 2019-10-10 PROCEDURE — 99254 IP/OBS CNSLTJ NEW/EST MOD 60: CPT | Performed by: INTERNAL MEDICINE

## 2019-10-10 PROCEDURE — 36415 COLL VENOUS BLD VENIPUNCTURE: CPT | Performed by: INTERNAL MEDICINE

## 2019-10-10 PROCEDURE — 90682 RIV4 VACC RECOMBINANT DNA IM: CPT | Performed by: INTERNAL MEDICINE

## 2019-10-10 PROCEDURE — 80053 COMPREHEN METABOLIC PANEL: CPT | Performed by: INTERNAL MEDICINE

## 2019-10-10 PROCEDURE — 99215 OFFICE O/P EST HI 40 MIN: CPT | Performed by: INTERNAL MEDICINE

## 2019-10-10 PROCEDURE — 85007 BL SMEAR W/DIFF WBC COUNT: CPT | Performed by: PHYSICIAN ASSISTANT

## 2019-10-10 PROCEDURE — 85027 COMPLETE CBC AUTOMATED: CPT | Performed by: PHYSICIAN ASSISTANT

## 2019-10-10 RX ORDER — SUCRALFATE ORAL 1 G/10ML
1000 SUSPENSION ORAL EVERY 6 HOURS SCHEDULED
Status: DISCONTINUED | OUTPATIENT
Start: 2019-10-10 | End: 2019-10-12 | Stop reason: HOSPADM

## 2019-10-10 RX ORDER — FINASTERIDE 5 MG/1
5 TABLET, FILM COATED ORAL DAILY
Status: DISCONTINUED | OUTPATIENT
Start: 2019-10-10 | End: 2019-10-12 | Stop reason: HOSPADM

## 2019-10-10 RX ADMIN — MORPHINE SULFATE 4 MG: 4 INJECTION INTRAVENOUS at 00:39

## 2019-10-10 RX ADMIN — FINASTERIDE 5 MG: 5 TABLET, FILM COATED ORAL at 17:30

## 2019-10-10 RX ADMIN — CEFAZOLIN SODIUM 2000 MG: 2 SOLUTION INTRAVENOUS at 08:00

## 2019-10-10 RX ADMIN — ENOXAPARIN SODIUM 40 MG: 40 INJECTION SUBCUTANEOUS at 08:11

## 2019-10-10 RX ADMIN — PANTOPRAZOLE SODIUM 40 MG: 40 TABLET, DELAYED RELEASE ORAL at 05:35

## 2019-10-10 RX ADMIN — CEFAZOLIN SODIUM 2000 MG: 2 SOLUTION INTRAVENOUS at 22:37

## 2019-10-10 RX ADMIN — SODIUM CHLORIDE 50 ML/HR: 0.9 INJECTION, SOLUTION INTRAVENOUS at 15:42

## 2019-10-10 RX ADMIN — CEFAZOLIN SODIUM 2000 MG: 2 SOLUTION INTRAVENOUS at 15:42

## 2019-10-10 RX ADMIN — SUCRALFATE 1000 MG: 1 SUSPENSION ORAL at 11:58

## 2019-10-10 RX ADMIN — SUCRALFATE 1000 MG: 1 SUSPENSION ORAL at 17:30

## 2019-10-10 RX ADMIN — INFLUENZA A VIRUS A/BRISBANE/02/2018 (H1N1) RECOMBINANT HEMAGGLUTININ ANTIGEN, INFLUENZA A VIRUS A/KANSAS/14/2017 (H3N2) RECOMBINANT HEMAGGLUTININ ANTIGEN, INFLUENZA B VIRUS B/PHUKET/3073/2013 RECOMBINANT HEMAGGLUTININ ANTIGEN, AND INFLUENZA B VIRUS B/MARYLAND/15/2016 RECOMBINANT HEMAGGLUTININ ANTIGEN 0.5 ML: 45; 45; 45; 45 INJECTION INTRAMUSCULAR at 17:30

## 2019-10-10 NOTE — ASSESSMENT & PLAN NOTE
· Reporting increased symptoms at this time   · Continue PPI   · Add Carafate QID   · Recommended follow up with Dr Micah Galan for further treatment

## 2019-10-10 NOTE — H&P
H&P- Brynn Hatchet 1960, 62 y o  male MRN: [id number]    Unit/Bed#: ED 16 Encounter: [id number]    Primary Care Provider: Greg Acevedo MD   Date and time admitted to hospital: 10/9/2019  7:30 PM        * Axillary lymphadenitis  Assessment & Plan  Patient treated for cellulitis as outpatient  Will start on IV antibiotic therapy  Consult Infectious Disease  Procalcitonin  Hydration  To consider CT scan chest if worsening infection    CLL (chronic lymphocytic leukemia) (HCC)  Assessment & Plan  Patient with chronic leukocytosis  Patient is followed by Dr Jesus Larson  Given patient's predisposition infection will ask Oncology to comment on management of CLL  White count appears to be close to baseline the 50s    Chronic GERD  Assessment & Plan  PPI      VTE Prophylaxis: Enoxaparin (Lovenox)  / sequential compression device   Code Status:  Full code  POLST: There is no POLST form on file for this patient (pre-hospital)      Anticipated Length of Stay:  Patient will be admitted on an Observation basis with an anticipated length of stay of  less than 2 midnights  Justification for Hospital Stay:  Needs further evaluation of infection    Total Time for Visit, including Counseling / Coordination of Care: 45 minutes  Greater than 50% of this total time spent on direct patient counseling and coordination of care  Chief Complaint:   Cellulitis    History of Present Illness:    Brynn Hatchet is a 62 y o  male who presents with a past medical history CLL  The patient presents emergency room tolerated for progressively worsening swelling and redness underneath his left axillary region  He was seen as an outpatient and started on Keflex  He reports taken antibiotics for 2 days  He presents to the ED with a 1 x 3 cm left axillary erythema  He has a known history of CLL but is not currently on treatment  Review of Systems:    Review of Systems   Constitutional: Positive for fever   Negative for activity change, appetite change and fatigue  Respiratory: Negative for apnea, cough, choking and chest tightness  Cardiovascular: Negative for chest pain and leg swelling  Musculoskeletal: Negative for arthralgias and back pain  Neurological: Negative for numbness  Hematological: Negative for adenopathy  All other systems reviewed and are negative  Past Medical and Surgical History:     Past Medical History:   Diagnosis Date    Abdominal pain     Epigastric pain     Gallbladder polyp     Leukocytosis     Lymphocytosis     Panic attacks     years ago    Rosacea        Past Surgical History:   Procedure Laterality Date    HEMORRHOID SURGERY      AL COLONOSCOPY FLX DX W/COLLJ SPEC WHEN PFRMD N/A 4/11/2016    Procedure: COLONOSCOPY;  Surgeon: Gissel Mcguire MD;  Location:  GI LAB; Service: Colorectal    AL ESOPHAGOGASTRODUODENOSCOPY TRANSORAL DIAGNOSTIC N/A 3/31/2017    Procedure: ESOPHAGOGASTRODUODENOSCOPY (EGD); Surgeon: Devendra Allen MD;  Location: [facility] GI LAB; Service: Gastroenterology       Meds/Allergies:    Prior to Admission medications    Medication Sig Start Date End Date Taking? Authorizing Provider   Alum Hydroxide-Mag Carbonate Berta Cinnamon Byron) 165-058 MG/10ML SUSP Take by mouth 7/21/17  Yes Historical Provider, MD   cephalexin (KEFLEX) 500 mg capsule Take 1 capsule (500 mg total) by mouth every 6 (six) hours for 10 days 10/7/19 10/17/19 Yes Mel Haider MD   finasteride (PROPECIA) 1 MG tablet Take 1 mg by mouth daily   Yes Historical Provider, MD   omeprazole (PriLOSEC) 20 mg delayed release capsule Take 1 capsule (20 mg total) by mouth daily 9/28/18  Yes Mel Haider MD   ranitidine (ZANTAC) 75 MG tablet Take 75 mg by mouth 2 (two) times a day   Yes Historical Provider, MD     I have reviewed home medications with patient personally      Allergies: No Known Allergies    Social History:     Marital Status: Single     Patient Pre-hospital Living Situation:  Home  Patient Pre-hospital Level of Mobility:  Ambulatory  Patient Pre-hospital Diet Restrictions:  Denies  Substance Use History:   Social History     Substance and Sexual Activity   Alcohol Use Yes    Comment: socially     Social History     Tobacco Use   Smoking Status Never Smoker   Smokeless Tobacco Never Used     Social History     Substance and Sexual Activity   Drug Use No       Family History:    History reviewed  No pertinent family history  Physical Exam:     Vitals:   Blood Pressure: 147/89 (10/09/19 2203)  Pulse: 68 (10/09/19 2203)  Temperature: 99 3 °F (37 4 °C) (10/09/19 1928)  Temp Source: Oral (10/09/19 1928)  Respirations: 20 (10/09/19 2203)  SpO2: 98 % (10/09/19 2203)    Physical Exam   Constitutional: He is oriented to person, place, and time  He appears well-nourished  Pulmonary/Chest: Effort normal  No respiratory distress  Abdominal: Soft  Bowel sounds are normal    Musculoskeletal: He exhibits no edema  Neurological: He is alert and oriented to person, place, and time  Skin:   Left axillary erythema and pain           Additional Data:     Lab Results: I have personally reviewed pertinent reports  Results from last 7 days   Lab Units 10/09/19  2037 10/07/19  1439   WBC Thousand/uL 50 79* 59 64*   HEMOGLOBIN g/dL 11 6* 12 5   HEMATOCRIT % 35 8* 39 7   PLATELETS Thousands/uL 126* 150   LYMPHO PCT %  --  42   MONO PCT %  --  4   EOS PCT %  --  0     Results from last 7 days   Lab Units 10/09/19  2037   SODIUM mmol/L 141   POTASSIUM mmol/L 4 3   CHLORIDE mmol/L 108   CO2 mmol/L 26   BUN mg/dL 13   CREATININE mg/dL 1 06   ANION GAP mmol/L 7   CALCIUM mg/dL 8 8   ALBUMIN g/dL 3 3*   TOTAL BILIRUBIN mg/dL 0 70   ALK PHOS U/L 88   ALT U/L 19   AST U/L 12   GLUCOSE RANDOM mg/dL 119                       Imaging: I have personally reviewed pertinent reports  No orders to display           ** Please Note: This note has been constructed using a voice recognition system  **

## 2019-10-10 NOTE — CONSULTS
Consultation - Infectious Disease   Pal Daniels 62 y o  male MRN: [id number]  Unit/Bed#: ED 16 Encounter: [id number]      IMPRESSION & RECOMMENDATIONS:   1  Cellulitis of left axilla  With noted axillary lymphadenitis, both present since Saturday without improvement on po antibiotics  Leukocytosis noted 10/7/19 prior to receiving/starting po antibiotics  Patient is currently nontoxic in appearance, vss    Currently on IV ancef  Will discuss with attending  White count around baseline (50s) post-po antibiotics and since admission  Continue to trend   Trend VS    2  Axillary lymphadenitis  Has been present since Saturday with no improvement  Oncology consulted    3  Chronic Lymphocytic Leukemia  Patient with chronic leukocytosis, baseline around 50s  Followed by Dr Mendoza Parents hematology-oncology as outpatient  Continued care by primary and oncology  HISTORY OF PRESENT ILLNESS:  Reason for Consult: Cellulitis of left axilla  HPI: Pal Daniels is a 62y o  year old pleasant male with past medical history significant for CLL and GERD presenting with axillary swelling, pain, and redness  Patient states that this past Saturday while on a walk outside ([place]), he noticed pain and palpable nodules to his left axilla  He felt fine until Monday when he had a fever and chills thus he presented to his PCP who ordered labs, sent him home with a 7-10 day course of cephalexin 500mg Q8 prescription, and instructed him to report to the ED if symptoms worsen  He states that Tuesday (the next day), he had a fever of 103 although this resolved over the course of the day  Wednesday he woke up with episodic pain to his left arm radiating from his axilla distally  He noticed that the redness and size of nodules/cysts had not improved since starting the antibiotics thus he presented to the ED for further evaluation  Cysts have not drained nor been open   Today, patient states that pain to his left arm has largely resolved and he believes the redness and size of the lumps has diminished although he is not quite sure  Did not sleep well last night  Denies nausea, vomiting, chills, chest pain, night sweats, recent cough  Denies open lesions to body  Patient recently switched his deodorant about week ago (2 days prior to current onset of symptoms) and notes that he had previous skin irritation with traditional deodorant use; he believes this may have something to do with the current symptoms  He endorses having vague recolleciton of scratching his left underarm with his thumb when shaving in his bathroom Saturday before his walk  Patient endorses recent teeth cleaning (around September 20th)  Patient has had Lyme's disease twice (around 12 years ago and around 2010) which were both treated with po doxy  He frequently walks outside and is a very active individual and notes that he frequently gets bit by bugs although does not remember any significant bite recently  Denies exposure to dogs or cat, denies bites by such  Patient denies IV drug use  Denies recent vacation out of the country or state  Patient is a [employer] professor  ED course: IV clindamycin 600 mg x1 dose  Left axilla bedside ultrasound: larger mass represented lymph node measuring approximately 2 5x2 0cm  Smaller mass demonstrating cobblestoning consistent with cellulitis without abscess  Admitted to medicine  Of note, patient is followed by Dr Wesley Moon (hem-onc) for his CLL  He is currently on no treatment but with frequent monitoring  Had previously been doing well without issues  REVIEW OF SYSTEMS:  A complete 12 point system-based review of systems is negative other than that noted in the HPI      PAST MEDICAL HISTORY:  Past Medical History:   Diagnosis Date    Abdominal pain     Epigastric pain     Gallbladder polyp     Leukocytosis     Lymphocytosis     Panic attacks     years ago    Rosacea      Past Surgical History:   Procedure Laterality Date    HEMORRHOID SURGERY      ID COLONOSCOPY FLX DX W/COLLJ SPEC WHEN PFRMD N/A 2016    Procedure: COLONOSCOPY;  Surgeon: Rex Arboleda MD;  Location:  GI LAB; Service: Colorectal    ID ESOPHAGOGASTRODUODENOSCOPY TRANSORAL DIAGNOSTIC N/A 3/31/2017    Procedure: ESOPHAGOGASTRODUODENOSCOPY (EGD); Surgeon: Linda Yip MD;  Location: [facility] GI LAB; Service: Gastroenterology       FAMILY HISTORY:  Non-contributory    SOCIAL HISTORY:  Social History   Social History     Substance and Sexual Activity   Alcohol Use Yes    Comment: socially     Social History     Substance and Sexual Activity   Drug Use No     Social History     Tobacco Use   Smoking Status Never Smoker   Smokeless Tobacco Never Used       ALLERGIES:  No Known Allergies    MEDICATIONS:  All current active medications have been reviewed      Day #2 IV ancef    PHYSICAL EXAM:  Temp:  [99 3 °F (37 4 °C)] 99 3 °F (37 4 °C)  HR:  [52-78] 66  Resp:  [16-20] 17  BP: (111-147)/(55-89) 118/55  SpO2:  [95 %-99 %] 95 %  Temp (24hrs), Av 3 °F (37 4 °C), Min:99 3 °F (37 4 °C), Max:99 3 °F (37 4 °C)  Current: Temperature: 99 3 °F (37 4 °C)    Intake/Output Summary (Last 24 hours) at 10/10/2019 0954  Last data filed at 10/10/2019 0830  Gross per 24 hour   Intake 150 ml   Output --   Net 150 ml       General Appearance:  Appearing well, nontoxic, and in no distress   Head:  Normocephalic, without obvious abnormality, atraumatic   Eyes:  Conjunctiva pink and sclera anicteric, both eyes   Nose: Nares normal, mucosa normal, no drainage   Throat: Oropharynx moist without lesions   Neck: Supple, symmetrical, no adenopathy, no tenderness/mass/nodules   Back:   Symmetric, no curvature, ROM normal, no CVA tenderness   Lungs:   Clear to auscultation bilaterally, respirations unlabored   Chest Wall:  No tenderness or deformity   Heart:  RRR; no murmur, rub or gallop   Abdomen:   Soft, non-tender, non-distended, positive bowel sounds    Extremities: B/L UE pain at axilla over nodes and mass  Intermittent pain to left arm from axilla distally  Skin: Erythematous, indurated, raised mass x2 to left axilla  Indurated and raised mass to right axilla  No fluctuance  No rashes  No draining wounds noted  Lymph nodes: Bilateral axillary lymph nodes raised and enlarged  Cervical, supraclavicular nodes normal   Neurologic: Alert and oriented times 3, extremity strength 5/5 and symmetric       LABS, IMAGING, & OTHER STUDIES:  Lab Results:  I have personally reviewed pertinent labs  Results from last 7 days   Lab Units 10/09/19  2338 10/09/19  2037 10/07/19  1439   WBC Thousand/uL  --  50 79* 59 64*   HEMOGLOBIN g/dL  --  11 6* 12 5   PLATELETS Thousands/uL 120* 126* 150     Results from last 7 days   Lab Units 10/10/19  0511 10/09/19  2037 10/07/19  1439   POTASSIUM mmol/L 4 3 4 3 4 6   CHLORIDE mmol/L 109* 108 106   CO2 mmol/L 29 26 30   BUN mg/dL 13 13 13   CREATININE mg/dL 1 03 1 06 1 16   EGFR ml/min/1 73sq m 78 75 68   CALCIUM mg/dL 8 5 8 8 8 5   AST U/L 10 12 21   ALT U/L 16 19 26   ALK PHOS U/L 78 88 102           Imaging Studies:   I have personally reviewed pertinent imaging study reports and images in PACS  Other Studies:   I have personally reviewed pertinent reports  Bedside ED ultrasound to left axilla per ED note: larger mass represented lymph node measuring approximately 2 5x2 0cm  Smaller mass demonstrating cobblestoning consistent with cellulitis without abscess

## 2019-10-10 NOTE — ED ATTENDING ATTESTATION
I,Peter Armstrong MD, saw and evaluated the patient  I have discussed the patient with the resident/non-physician practitioner and agree with the resident's/non-physician practitioner's findings, Plan of Care, and MDM as documented in the resident's/non-physician practitioner's note, except where noted  All available labs and Radiology studies were reviewed  I was present for key portions of any procedure(s) performed by the resident/non-physician practitioner and I was immediately available to provide assistance  At this point I agree with the current assessment done in the Emergency Department  I have conducted an independent evaluation of this patient including a focused history and a physical exam          75-year-old male, history of CLL which is currently not being treated, presenting to the emergency department for evaluation of progressive left armpit swelling and redness  Patient had seen his primary care physician for evaluation of redness in his arm pit was started on Keflex  Patient has had continued progression of redness and now is swollen  No reported fever  The on examination the patient has a erythematous, indurated and raised area measuring approximately 1 cm x 3 cm in his left axilla  This is mildly tender and is nonfluctuant  The patient has a large mass which is deep to this measuring approximately 3 cm x 3  Cm  The patient then has a ring of erythema that extends anterior to his axilla onto his pectoralis measuring approximately 3-4 cm by approximately 8 cm  Bedside ultrasound was utilized and demonstrates that the larger of the mass represented a lymph node of approximately 2 and half by 2 cm  The smaller area demonstrated cobblestoning consistent with cellulitis without any evidence of abscess  Because of progressive erythema while taking oral antibiotics, patient will be covered for strep and MRSA      Labs Reviewed   CBC AND DIFFERENTIAL - Abnormal       Result Value Ref Range Status    WBC 50 79 (*) 4 31 - 10 16 Thousand/uL Preliminary    Comment: This result has been called to [address] by Claudean Miracle on 10 09 2019 at 2125, and has been read back  RBC 3 72 (*) 3 88 - 5 62 Million/uL Preliminary    Hemoglobin 11 6 (*) 12 0 - 17 0 g/dL Preliminary    Hematocrit 35 8 (*) 36 5 - 49 3 % Preliminary    MCV 96  82 - 98 fL Preliminary    MCH 31 2  26 8 - 34 3 pg Preliminary    MCHC 32 4  31 4 - 37 4 g/dL Preliminary    RDW 14 1  11 6 - 15 1 % Preliminary    MPV 11 0  8 9 - 12 7 fL Preliminary    Platelets 944 (*) 735 - 390 Thousands/uL Preliminary    nRBC 0  /100 WBCs Preliminary   COMPREHENSIVE METABOLIC PANEL - Abnormal    Sodium 141  136 - 145 mmol/L Final    Potassium 4 3  3 5 - 5 3 mmol/L Final    Chloride 108  100 - 108 mmol/L Final    CO2 26  21 - 32 mmol/L Final    ANION GAP 7  4 - 13 mmol/L Final    BUN 13  5 - 25 mg/dL Final    Creatinine 1 06  0 60 - 1 30 mg/dL Final    Comment: Standardized to IDMS reference method    Glucose 119  65 - 140 mg/dL Final    Comment:   If the patient is fasting, the ADA then defines impaired fasting glucose as > 100 mg/dL and diabetes as > or equal to 123 mg/dL  Specimen collection should occur prior to Sulfasalazine administration due to the potential for falsely depressed results  Specimen collection should occur prior to Sulfapyridine administration due to the potential for falsely elevated results  Calcium 8 8  8 3 - 10 1 mg/dL Final    AST 12  5 - 45 U/L Final    Comment:   Specimen collection should occur prior to Sulfasalazine administration due to the potential for falsely depressed results  ALT 19  12 - 78 U/L Final    Comment:   Specimen collection should occur prior to Sulfasalazine and/or Sulfapyridine administration due to the potential for falsely depressed results       Alkaline Phosphatase 88  46 - 116 U/L Final    Total Protein 6 5  6 4 - 8 2 g/dL Final    Albumin 3 3 (*) 3 5 - 5 0 g/dL Final    Total Bilirubin 0 70  0 20 - 1 00 mg/dL Final    eGFR 75  ml/min/1 73sq m Final    Narrative:     [place] guidelines for Chronic Kidney Disease (CKD):     Stage 1 with normal or high GFR (GFR > 90 mL/min/1 73 square meters)    Stage 2 Mild CKD (GFR = 60-89 mL/min/1 73 square meters)    Stage 3A Moderate CKD (GFR = 45-59 mL/min/1 73 square meters)    Stage 3B Moderate CKD (GFR = 30-44 mL/min/1 73 square meters)    Stage 4 Severe CKD (GFR = 15-29 mL/min/1 73 square meters)    Stage 5 End Stage CKD (GFR <15 mL/min/1 73 square meters)  Note: GFR calculation is accurate only with a steady state creatinine   PATH SLIDE REVIEW   MANUAL DIFFERENTIAL(PHLEBS DO NOT ORDER)       No orders to display

## 2019-10-10 NOTE — ASSESSMENT & PLAN NOTE
· POA, Patient treated for cellulitis as outpatient without improvement  Continues with pain and hard, cystic area in his left axilla  No drainage noted  WBCs at baseline   N other sepsis criteria noted   · Await further infectious disease input  · Continue with Ancef IV for now  · Warm compress  · Many need to consider CT chest or surgical consultation for drainage   · Trend temps, CBC  · PCT 0 22, repeat pending

## 2019-10-10 NOTE — UTILIZATION REVIEW
Initial Clinical Review    Admission: Date/Time/Statement:    10/9/19 AT 2149 OBSERVATION   Orders Placed This Encounter   Procedures    Place in Observation     Standing Status:   Standing     Number of Occurrences:   1     Order Specific Question:   Admitting Physician     Answer:   Hayley Ceballos [[id number]]     Order Specific Question:   Level of Care     Answer:   Med Surg [[id number]]     ED Arrival Information     Expected Arrival Acuity Means of Arrival Escorted By Service Admission Type    - 10/9/2019 19:03 Urgent Walk-In Self Hospitalist Urgent    Arrival Complaint    Axillary Lymphadenitis     Chief Complaint   Patient presents with    Cyst     "I went to my doctor and it looks like there are some cysts under my left armpit, I was sent home with antibiotics and told to come in if it worsened  Today the pain just got so bad I had to come in "      Chief Complaint:   Cellulitis     History of Present Illness:   62 y o  male who presents with a past medical history CLL  The patient presents emergency room tolerated for progressively worsening swelling and redness underneath his left axillary region  He was seen as an outpatient and started on Keflex  He reports taken antibiotics for 2 days  He presents to the ED with a 1 x 3 cm left axillary erythema  He has a known history of CLL but is not currently on treatment      Review of Systems:   Constitutional: Positive for fever  Respiratory: Negative for apnea, cough, choking and chest tightness  Cardiovascular: Negative for chest pain and leg swelling  All other systems reviewed and are negative      Physical Exam   Skin:   Left axillary erythema and pain      Assessment/Plan:   Axillary lymphadenitis  Assessment & Plan  Patient treated for cellulitis as outpatient  Will start on IV antibiotic therapy    Consult Infectious Disease  Procalcitonin  Hydration  To consider CT scan chest if worsening infection     CLL (chronic lymphocytic leukemia) [facility])  Assessment & Plan  Patient with chronic leukocytosis  Patient is followed by Dr Katelynn Atkins  Given patient's predisposition infection will ask Oncology to comment on management of CLL  White count appears to be close to baseline the 50s     VTE Prophylaxis: Enoxaparin (Lovenox)  / sequential compression device      Anticipated Length of Stay:  Patient will be admitted on an Observation basis with an anticipated length of stay of  less than 2 midnights        ED Triage Vitals   Temperature Pulse Respirations Blood Pressure SpO2   10/09/19 1928 10/09/19 1928 10/09/19 1928 10/09/19 1928 10/09/19 1928   99 3 °F (37 4 °C) 78 18 130/86 98 %      Temp Source Heart Rate Source Patient Position - Orthostatic VS BP Location FiO2 (%)   10/09/19 1928 10/09/19 1928 10/09/19 1928 10/09/19 1928 --   Oral Monitor Sitting Right arm       Pain Score       10/09/19 2353       4        Wt Readings from Last 1 Encounters:   10/07/19 73 kg (161 lb)     Additional Vital Signs:   10/10/19 0623  --  66  16  111/59  97 %    10/09/19 2353  --  52Abnormal   16  122/70  99 %      Pertinent Labs/Diagnostic Test Results:   Results from last 7 days   Lab Units 10/09/19  2338 10/09/19  2037 10/07/19  1439   WBC Thousand/uL  --  50 79* 59 64*   HEMOGLOBIN g/dL  --  11 6* 12 5   HEMATOCRIT %  --  35 8* 39 7   PLATELETS Thousands/uL 120* 126* 150       Results from last 7 days   Lab Units 10/10/19  0511 10/09/19  2037 10/07/19  1439   SODIUM mmol/L 143 141 139   POTASSIUM mmol/L 4 3 4 3 4 6   CHLORIDE mmol/L 109* 108 106   CO2 mmol/L 29 26 30   ANION GAP mmol/L 5 7 3*   BUN mg/dL 13 13 13   CREATININE mg/dL 1 03 1 06 1 16   EGFR ml/min/1 73sq m 78 75 68   CALCIUM mg/dL 8 5 8 8 8 5   MAGNESIUM mg/dL 1 9  --   --    PHOSPHORUS mg/dL 3 5  --   --      Results from last 7 days   Lab Units 10/10/19  0511 10/09/19  2037 10/07/19  1439   AST U/L 10 12 21   ALT U/L 16 19 26   ALK PHOS U/L 78 88 102   TOTAL PROTEIN g/dL 6 1* 6 5 6 7   ALBUMIN g/dL 3 2* 3 3* 4  3   TOTAL BILIRUBIN mg/dL 0 70 0 70 1 67*       Results from last 7 days   Lab Units 10/10/19  0511 10/09/19  2037 10/07/19  1439   GLUCOSE RANDOM mg/dL 87 119 94     Results from last 7 days   Lab Units 10/10/19  0511 10/09/19  2339   PROCALCITONIN ng/ml 0 22 0 22     BLOOD CULTURES X 2 PENDING    ED Treatment:   Medication Administration from 10/09/2019 1903 to 10/10/2019 9657       Date/Time Order Dose Route Action     10/09/2019 2150 clindamycin (CLEOCIN) IVPB (premix) 600 mg 0 mg Intravenous Stopped     10/09/2019 2043 clindamycin (CLEOCIN) IVPB (premix) 600 mg 600 mg Intravenous New Bag     10/10/2019 0039 morphine (PF) 4 mg/mL injection 4 mg 4 mg Intravenous Given     10/09/2019 2344 morphine (PF) 4 mg/mL injection 4 mg 0 mg Intravenous Hold     10/10/2019 0535 pantoprazole (PROTONIX) EC tablet 40 mg 40 mg Oral Given     10/09/2019 2343 sodium chloride 0 9 % infusion 50 mL/hr Intravenous New Bag     10/10/2019 0811 enoxaparin (LOVENOX) subcutaneous injection 40 mg 40 mg Subcutaneous Given     10/10/2019 0830 ceFAZolin (ANCEF) IVPB (premix) 2,000 mg 0 mg Intravenous Stopped     10/10/2019 0800 ceFAZolin (ANCEF) IVPB (premix) 2,000 mg 2,000 mg Intravenous New Bag     10/10/2019 0038 ceFAZolin (ANCEF) IVPB (premix) 2,000 mg 0 mg Intravenous Stopped     10/09/2019 2357 ceFAZolin (ANCEF) IVPB (premix) 2,000 mg 2,000 mg Intravenous New Bag     Past Medical History:   Diagnosis Date    Abdominal pain     Epigastric pain     Gallbladder polyp     Leukocytosis     Lymphocytosis     Panic attacks     years ago    Rosacea      Present on Admission:   [facility]LL [facility] [address] )   Axillary lymphadenitis   Chronic GERD    Admitting Diagnosis: Axillary lymphadenitis [I88 9]    Age/Sex: 62 y o  male    Admission Orders:  REGULAR HOUSE DIET  PT + OT EVALS    Medications:  cefazolin 2,000 mg Intravenous Q8H   enoxaparin 40 mg Subcutaneous Daily   pantoprazole 40 mg Oral Early Morning     sodium chloride 50 mL/hr Intravenous Continuous     aluminum-magnesium hydroxide-simethicone 5 mL Oral Q4H PRN       IP CONSULT TO INFECTIOUS DISEASES  IP CONSULT TO ONCOLOGY    Network Utilization Review Department  Phone: [phone]; Fax [phone]  [email or web address]  ATTENTION: Please call with any questions or concerns to [phone]  and carefully listen to the prompts so that you are directed to the right person  Send all requests for admission clinical reviews, approved or denied determinations and any other requests to fax [phone]   All voicemails are confidential

## 2019-10-10 NOTE — ED NOTES
Pt put in hold bed for his comfort  Pt resting comfortably at this time        Lakeisha Ferris, RN  10/10/19 8771

## 2019-10-10 NOTE — ASSESSMENT & PLAN NOTE
Patient treated for cellulitis as outpatient  Will start on IV antibiotic therapy    Consult Infectious Disease  Procalcitonin  Hydration

## 2019-10-10 NOTE — PLAN OF CARE
Problem: PAIN - ADULT  Goal: Verbalizes/displays adequate comfort level or baseline comfort level  Description  Interventions:  - Encourage patient to monitor pain and request assistance  - Assess pain using appropriate pain scale  - Administer analgesics based on type and severity of pain and evaluate response  - Implement non-pharmacological measures as appropriate and evaluate response  - Consider cultural and social influences on pain and pain management  - Notify physician/advanced practitioner if interventions unsuccessful or patient reports new pain  Outcome: Progressing     Problem: INFECTION - ADULT  Goal: Absence or prevention of progression during hospitalization  Description  INTERVENTIONS:  - Assess and monitor for signs and symptoms of infection  - Monitor lab/diagnostic results  - Monitor all insertion sites, i e  indwelling lines, tubes, and drains  - Monitor endotracheal if appropriate and nasal secretions for changes in amount and color  - [place] appropriate cooling/warming therapies per order  - Administer medications as ordered  - Instruct and encourage patient and family to use good hand hygiene technique  - Identify and instruct in appropriate isolation precautions for identified infection/condition  Outcome: Progressing  Goal: Absence of fever/infection during neutropenic period  Description  INTERVENTIONS:  - Monitor WBC    Outcome: Progressing     Problem: SAFETY ADULT  Goal: Patient will remain free of falls  Description  INTERVENTIONS:  - Assess patient frequently for physical needs  -  Identify cognitive and physical deficits and behaviors that affect risk of falls    -  [place] fall precautions as indicated by assessment   - Educate patient/family on patient safety including physical limitations  - Instruct patient to call for assistance with activity based on assessment  - Modify environment to reduce risk of injury  - Consider OT/PT consult to assist with strengthening/mobility  Outcome: Progressing  Goal: Maintain or return to baseline ADL function  Description  INTERVENTIONS:  -  Assess patient's ability to carry out ADLs; assess patient's baseline for ADL function and identify physical deficits which impact ability to perform ADLs (bathing, care of mouth/teeth, toileting, grooming, dressing, etc )  - Assess/evaluate cause of self-care deficits   - Assess range of motion  - Assess patient's mobility; develop plan if impaired  - Assess patient's need for assistive devices and provide as appropriate  - Encourage maximum independence but intervene and supervise when necessary  - Involve family in performance of ADLs  - Assess for home care needs following discharge   - Consider OT consult to assist with ADL evaluation and planning for discharge  - Provide patient education as appropriate  Outcome: Progressing  Goal: Maintain or return mobility status to optimal level  Description  INTERVENTIONS:  - Assess patient's baseline mobility status (ambulation, transfers, stairs, etc )    - Identify cognitive and physical deficits and behaviors that affect mobility  - Identify mobility aids required to assist with transfers and/or ambulation (gait belt, sit-to-stand, lift, walker, cane, etc )  - [place] fall precautions as indicated by assessment  - Record patient progress and toleration of activity level on Mobility SBAR; progress patient to next Phase/Stage  - Instruct patient to call for assistance with activity based on assessment  - Consider rehabilitation consult to assist with strengthening/weightbearing, etc   Outcome: Progressing     Problem: DISCHARGE PLANNING  Goal: Discharge to home or other facility with appropriate resources  Description  INTERVENTIONS:  - Identify barriers to discharge w/patient and caregiver  - Arrange for needed discharge resources and transportation as appropriate  - Identify discharge learning needs (meds, wound care, etc )  - Arrange for interpretive services to assist at discharge as needed  - Refer to Case Management Department for coordinating discharge planning if the patient needs post-hospital services based on physician/advanced practitioner order or complex needs related to functional status, cognitive ability, or social support system  Outcome: Progressing     Problem: Knowledge Deficit  Goal: Patient/family/caregiver demonstrates understanding of disease process, treatment plan, medications, and discharge instructions  Description  Complete learning assessment and assess knowledge base    Interventions:  - Provide teaching at level of understanding  - Provide teaching via preferred learning methods  Outcome: Progressing

## 2019-10-10 NOTE — CONSULTS
Consultation - Medical Oncology   Luke Bustillos 62 y o  male MRN: [id number]  Unit/Bed#: [facility] 913-01 Encounter: [id number]    Referring physician:  Ab Ibarra  Reason for Consult:  CLL  HPI: Luke Bustillos is a 62y o  year old male who presents with history of CLL, stage 0 and has been under surveillance  He follows with Dr Juve Wheeler  He is here because of infected left axillary lymph nodes and is receiving intravenous antibiotic  He has tiredness  He had fever that has come down  He has lost weight and she states that was intentional because of GERD and he was trying to eat less  He states he had EGD and colonoscopy 2 years ago and test results are negative  ROS:  10/10/19 Reviewed 13 systems:  Presently no headaches, seizures, dizziness, diplopia, dysphagia, hoarseness, chest pain, palpitations, shortness of breath, cough, hemoptysis, abdominal pain, nausea, vomiting, change in bowel habits, melena, hematuria,  chills, bleeding, bone pains, skin rash,  arthritic symptoms,  weakness, numbness, claudication and gait problem  Not unusually sensitive to heat or cold  No swelling of the ankles  Patient is anxious  Other symptoms are in HPI        Historical Information   Past Medical History:   Diagnosis Date    Abdominal pain     Epigastric pain     Gallbladder polyp     Leukocytosis     Lymphocytosis     Panic attacks     years ago    Rosacea      Past Surgical History:   Procedure Laterality Date    HEMORRHOID SURGERY      FL COLONOSCOPY FLX DX W/COLLJ SPEC WHEN PFRMD N/A 4/11/2016    Procedure: COLONOSCOPY;  Surgeon: Nestor Self MD;  Location:  GI LAB; Service: Colorectal    FL ESOPHAGOGASTRODUODENOSCOPY TRANSORAL DIAGNOSTIC N/A 3/31/2017    Procedure: ESOPHAGOGASTRODUODENOSCOPY (EGD); Surgeon: Markos Vera MD;  Location: [facility] GI LAB;   Service: Gastroenterology     Social History   Social History     Substance and Sexual Activity   Alcohol Use Yes    Comment: socially     Social History     Substance and Sexual Activity   Drug Use No     Social History     Tobacco Use   Smoking Status Never Smoker   Smokeless Tobacco Never Used     Family History: History reviewed  No pertinent family history        Current Facility-Administered Medications:     aluminum-magnesium hydroxide-simethicone (MYLANTA) 200-200-20 mg/5 mL oral suspension 5 mL, 5 mL, Oral, Q4H PRN, Hetul Rodríguez, DO    ceFAZolin (ANCEF) IVPB (premix) 2,000 mg, 2,000 mg, Intravenous, Q8H, Hetul Rodríguez, DO, Last Rate: 100 mL/hr at 10/10/19 1542, 2,000 mg at 10/10/19 1542    enoxaparin (LOVENOX) subcutaneous injection 40 mg, 40 mg, Subcutaneous, Daily, Hetul Rodríguez, DO, 40 mg at 10/10/19 0811    finasteride (PROSCAR) tablet 5 mg, 5 mg, Oral, Daily, Ismael Martinez PA-C    influenza vaccine, recombinant, quadrivalent (FLUBLOK) IM injection 0 5 mL, 0 5 mL, Intramuscular, Prior to discharge, Andry Nunez,     pantoprazole (PROTONIX) EC tablet 40 mg, 40 mg, Oral, Early Morning, Hetul Rodríguez, DO, 40 mg at 10/10/19 0535    sodium chloride 0 9 % infusion, 50 mL/hr, Intravenous, Continuous, Hetul Rodríguez, DO, Last Rate: 50 mL/hr at 10/10/19 1542, 50 mL/hr at 10/10/19 1542    sucralfate (CARAFATE) oral suspension 1,000 mg, 1,000 mg, Oral, Q6H [address], Ismael Martinez PA-C, 1,000 mg at 10/10/19 1158    Allergies   Allergen Reactions    Other      Seasonal Allergies     @ ROS@  Physical Exam:  Vitals:    10/10/19 1000 10/10/19 1100 10/10/19 1330 10/10/19 1522   BP: 103/58  133/60 97/59   BP Location:       Pulse: 70  69 66   Resp: 16  16 18   Temp:    97 9 °F (36 6 °C)   TempSrc:       SpO2: 95%  100% 96%   Weight:  73 kg (161 lb)     Height:  5' 10 5" (1 791 m)       Alert, oriented, not in distress, no icterus, no oral thrush, no palpable neck mass, clear lung fields, regular heart rate, abdomen  soft and non tender, no palpable abdominal mass, no ascites, no edema of ankles, no calf tenderness, no focal neurological deficit, no skin rash, no palpable lymphadenopathy in the neck and groin areas but has some swelling and tenderness in left axilla, good arterial pulses, no clubbing  Patient is anxious  Lab Results: I have reviewed all pertinent labs  LABS:  Results for orders placed or performed during the hospital encounter of 10/09/19   CBC and differential   Result Value Ref Range    WBC 50 79 (HH) 4 31 - 10 16 Thousand/uL    RBC 3 72 (L) 3 88 - 5 62 Million/uL    Hemoglobin 11 6 (L) 12 0 - 17 0 g/dL    Hematocrit 35 8 (L) 36 5 - 49 3 %    MCV 96 82 - 98 fL    MCH 31 2 26 8 - 34 3 pg    MCHC 32 4 31 4 - 37 4 g/dL    RDW 14 1 11 6 - 15 1 %    MPV 11 0 8 9 - 12 7 fL    Platelets 453 (L) 849 - 390 Thousands/uL    nRBC 0 /100 WBCs   Comprehensive metabolic panel   Result Value Ref Range    Sodium 141 136 - 145 mmol/L    Potassium 4 3 3 5 - 5 3 mmol/L    Chloride 108 100 - 108 mmol/L    CO2 26 21 - 32 mmol/L    ANION GAP 7 4 - 13 mmol/L    BUN 13 5 - 25 mg/dL    Creatinine 1 06 0 60 - 1 30 mg/dL    Glucose 119 65 - 140 mg/dL    Calcium 8 8 8 3 - 10 1 mg/dL    AST 12 5 - 45 U/L    ALT 19 12 - 78 U/L    Alkaline Phosphatase 88 46 - 116 U/L    Total Protein 6 5 6 4 - 8 2 g/dL    Albumin 3 3 (L) 3 5 - 5 0 g/dL    Total Bilirubin 0 70 0 20 - 1 00 mg/dL    eGFR 75 ml/min/1 73sq m   Path Slide Review   Result Value Ref Range    Path Review       Absolute lymphocytosis morphologically compatible with patient's known chronic lymphocytic leukemia  Prolymphocytes comprise approximately 20% of total cellularity  Red cells are normocytic and normochromic  Platelet estimate agrees with direct count     Platelet count   Result Value Ref Range    Platelets 907 (L) 352 - 390 Thousands/uL    MPV 10 5 8 9 - 12 7 fL   Procalcitonin   Result Value Ref Range    Procalcitonin 0 22 <=0 25 ng/ml   Comprehensive metabolic panel   Result Value Ref Range    Sodium 143 136 - 145 mmol/L    Potassium 4 3 3 5 - 5 3 mmol/L    Chloride 109 (H) 100 - 108 mmol/L    CO2 29 21 - 32 mmol/L    ANION GAP 5 4 - 13 mmol/L    BUN 13 5 - 25 mg/dL    Creatinine 1 03 0 60 - 1 30 mg/dL    Glucose 87 65 - 140 mg/dL    Glucose, Fasting 87 65 - 99 mg/dL    Calcium 8 5 8 3 - 10 1 mg/dL    AST 10 5 - 45 U/L    ALT 16 12 - 78 U/L    Alkaline Phosphatase 78 46 - 116 U/L    Total Protein 6 1 (L) 6 4 - 8 2 g/dL    Albumin 3 2 (L) 3 5 - 5 0 g/dL    Total Bilirubin 0 70 0 20 - 1 00 mg/dL    eGFR 78 ml/min/1 73sq m   Magnesium   Result Value Ref Range    Magnesium 1 9 1 6 - 2 6 mg/dL   Phosphorus   Result Value Ref Range    Phosphorus 3 5 2 3 - 4 1 mg/dL   Procalcitonin   Result Value Ref Range    Procalcitonin 0 22 <=0 25 ng/ml   CBC and differential   Result Value Ref Range    WBC 39 26 (HH) 4 31 - 10 16 Thousand/uL    RBC 3 49 (L) 3 88 - 5 62 Million/uL    Hemoglobin 10 7 (L) 12 0 - 17 0 g/dL    Hematocrit 33 7 (L) 36 5 - 49 3 %    MCV 97 82 - 98 fL    MCH 30 7 26 8 - 34 3 pg    MCHC 31 8 31 4 - 37 4 g/dL    RDW 14 2 11 6 - 15 1 %    MPV 10 8 8 9 - 12 7 fL    Platelets 654 (L) 470 - 390 Thousands/uL    nRBC 0 /100 WBCs   Manual Differential(PHLEBS Do Not Order)   Result Value Ref Range    Segmented % 19 (L) 43 - 75 %    Lymphocytes % 53 (H) 14 - 44 %    Monocytes % 6 4 - 12 %    Eosinophils, % 2 0 - 6 %    Basophils % 0 0 - 1 %    Atypical Lymphocytes % 20 (H) <=0 %    Absolute Neutrophils 9 65 (H) 1 85 - 7 62 Thousand/uL    Lymphocytes Absolute 26 92 (H) 0 60 - 4 47 Thousand/uL    Monocytes Absolute 3 05 (H) 0 00 - 1 22 Thousand/uL    Eosinophils Absolute 1 02 (H) 0 00 - 0 40 Thousand/uL    Basophils Absolute 0 00 0 00 - 0 10 Thousand/uL    Total Counted 100     Smudge Cells Present     Poikilocytes Present     Platelet Estimate Decreased (A) Adequate    Pathology Review Yes (A) No   Manual Differential(PHLEBS Do Not Order)   Result Value Ref Range    Segmented % 14 (L) 43 - 75 %    Lymphocytes % 82 (H) 14 - 44 %    Monocytes % 0 (L) 4 - 12 %    Eosinophils, % 0 0 - 6 %    Basophils % 1 0 - 1 %    Atypical Lymphocytes % 3 (H) <=0 %    Absolute Neutrophils 5 50 1 85 - 7 62 Thousand/uL    Lymphocytes Absolute 32 19 (H) 0 60 - 4 47 Thousand/uL    Monocytes Absolute 0 00 0 00 - 1 22 Thousand/uL    Eosinophils Absolute 0 00 0 00 - 0 40 Thousand/uL    Basophils Absolute 0 39 (H) 0 00 - 0 10 Thousand/uL    Total Counted      Smudge Cells Present     RBC Morphology Present     Anisocytosis Present     Platelet Estimate Decreased (A) Adequate         Imaging Studies: I have personally reviewed pertinent reports  Pathology, and Other Studies: I have personally reviewed pertinent reports  Assessment and Plan:  CLL  Infected left axilla and that could be infected left axillary lymph node  Mild anemia is new and that could be secondary to infection  Mild thrombocytopenia  Patient is on antibiotics from ID service  To monitor patient's condition and counts  To check reticulocyte count, anemia parameters and IgG level in addition to LDH and uric acid  Additional recommendations to follow  Dr Elmer Vidales will see him when rounding this coming week  Discussed with patient and explained  Questions answered  A radiologist in the room with the patient  Patient voiced understanding and agreement in the discussion  Counseling / Coordination of Care    Greater than 50% of total time was spent with the patient and / or family counseling and / or coordination of care

## 2019-10-10 NOTE — ASSESSMENT & PLAN NOTE
· Patient with chronic leukocytosis in the 50s  At baseline  No active treatment at this time   · Patient is followed by Dr Erin Marcano  · Given patient's predisposition infection will ask Oncology to comment on management of CLL    · Pending - appreciate recommendations

## 2019-10-10 NOTE — PROGRESS NOTES
[facility]  Progress Note - Gabbie Preeti 1960, 62 y o  male MRN: [id number]    Unit/Bed#: ED 16 Encounter: [id number]    Primary Care Provider: Livia Caro MD   Date and time admitted to hospital: 10/9/2019  7:30 PM        * Axillary lymphadenitis  Assessment & Plan  · POA, Patient treated for cellulitis as outpatient without improvement  Continues with pain and hard, cystic area in his left axilla  No drainage noted  WBCs at baseline  N other sepsis criteria noted   · Await further infectious disease input  · Continue with Ancef IV for now  · Warm compress  · Many need to consider CT chest or surgical consultation for drainage   · Trend temps, CBC  · PCT 0 22, repeat pending       [facility]  Assessment & Plan  · Patient with chronic leukocytosis in the 50s  At baseline  No active treatment at this time   · Patient is followed by Dr Elmer Vidales  · Given patient's predisposition infection will ask Oncology to comment on management of CLL  · Pending - appreciate recommendations       Chronic GERD  Assessment & Plan  · Reporting increased symptoms at this time   · Continue PPI   · Add Carafate QID   · Recommended follow up with Dr Sullivan Done for further treatment       VTE Pharmacologic Prophylaxis:   Pharmacologic: Enoxaparin (Lovenox)  Mechanical VTE Prophylaxis in Place: No    Patient Centered Rounds: I have performed bedside rounds with nursing staff today  Discussions with Specialists or Other Care Team Provider: Discussed with RN    Education and Discussions with Family / Patient: Discussed with patient     Time Spent for Care: 30 minutes  More than 50% of total time spent on counseling and coordination of care as described above      Current Length of Stay: 0 day(s)    Current Patient Status: Observation   Certification Statement: The patient will continue to require additional inpatient hospital stay due to on going IV antibiotics pending ID and oncology consultations    Discharge Plan: Further improvement of lymphadenitis needed     Code Status: Level 1 - Full Code      Subjective:   Patient reports minimal improvement in his left axillary pain  Reports decreased arm ROM due to this  Denies fevers or chills  He reports that he is having increased GERD symptoms with burning in his neck and chest  States that he was taking Omeprazole at home and had added zantac with minimal relief  States that he has not seen GI in some time  Objective:     Vitals:   Temp (24hrs), Av 3 °F (37 4 °C), Min:99 3 °F (37 4 °C), Max:99 3 °F (37 4 °C)    Temp:  [99 3 °F (37 4 °C)] 99 3 °F (37 4 °C)  HR:  [52-78] 70  Resp:  [16-20] 16  BP: (103-147)/(55-89) 103/58  SpO2:  [95 %-99 %] 95 %  Body mass index is 22 77 kg/m²  Input and Output Summary (last 24 hours): Intake/Output Summary (Last 24 hours) at 10/10/2019 1231  Last data filed at 10/10/2019 0830  Gross per 24 hour   Intake 150 ml   Output --   Net 150 ml       Physical Exam:     Physical Exam   Constitutional: He is oriented to person, place, and time  Vital signs are normal  He appears well-developed and well-nourished  Non-toxic appearance  No distress  HENT:   Head: Normocephalic and atraumatic  Mouth/Throat: Mucous membranes are not dry  Eyes: Pupils are equal, round, and reactive to light  Conjunctivae and EOM are normal  No scleral icterus  Pupils are equal    Neck: Neck supple  Cardiovascular: Normal rate, regular rhythm, S1 normal, S2 normal, normal heart sounds and intact distal pulses  Exam reveals no S3 and no S4  No murmur heard  Pulmonary/Chest: Effort normal and breath sounds normal  No accessory muscle usage or stridor  No respiratory distress  He has no decreased breath sounds  He has no wheezes  He has no rhonchi  He has no rales  He exhibits no tenderness  Abdominal: Soft  Bowel sounds are normal  He exhibits no distension and no mass  There is no tenderness   There is no rigidity, no rebound and no guarding  Neurological: He is alert and oriented to person, place, and time  He is not disoriented  GCS eye subscore is 4  GCS verbal subscore is 5  GCS motor subscore is 6  Skin: Skin is warm and dry  There is erythema (Left axilla with swollen area )  Additional Data:     Labs:    Results from last 7 days   Lab Units 10/10/19  1017   WBC Thousand/uL 39 26*   HEMOGLOBIN g/dL 10 7*   HEMATOCRIT % 33 7*   PLATELETS Thousands/uL 120*   LYMPHO PCT % 82*   MONO PCT % 0*   EOS PCT % 0     Results from last 7 days   Lab Units 10/10/19  0511   POTASSIUM mmol/L 4 3   CHLORIDE mmol/L 109*   CO2 mmol/L 29   BUN mg/dL 13   CREATININE mg/dL 1 03   CALCIUM mg/dL 8 5   ALK PHOS U/L 78   ALT U/L 16   AST U/L 10           * I Have Reviewed All Lab Data Listed Above  * Additional Pertinent Lab Tests Reviewed: Kellie 66 Admission Reviewed    Imaging:    Imaging Reports Reviewed Today Include: None  Imaging Personally Reviewed by Myself Includes:  None    Recent Cultures (last 7 days):           Last 24 Hours Medication List:     Current Facility-Administered Medications:  aluminum-magnesium hydroxide-simethicone 5 mL Oral Q4H PRN Hetul Rodríguez, DO    cefazolin 2,000 mg Intravenous Q8H Hetul Rodríguez, DO Last Rate: Stopped (10/10/19 0830)   enoxaparin 40 mg Subcutaneous Daily Hetul Rodríguez, DO    influenza vaccine 0 5 mL Intramuscular Prior to discharge Apryl Lopez,     pantoprazole 40 mg Oral Early Morning Hetul Rodríguez, DO    sodium chloride 50 mL/hr Intravenous Continuous Hetul Rodríguez, DO Last Rate: 50 mL/hr (10/09/19 4403)   sucralfate 1,000 mg Oral Q6H [address] Ismael Oral BECCA Garcia         Today, Patient Was Seen By: Rukhsana Mcghee PA-C    ** Please Note: Dictation voice to text software may have been used in the creation of this document   **

## 2019-10-11 ENCOUNTER — APPOINTMENT (INPATIENT)
Dept: RADIOLOGY | Facility: HOSPITAL | Age: 59
DRG: 815 | End: 2019-10-11
Payer: COMMERCIAL

## 2019-10-11 LAB
ANION GAP SERPL CALCULATED.3IONS-SCNC: 4 MMOL/L (ref 4–13)
BASOPHILS # BLD AUTO: 0.12 THOUSANDS/ΜL (ref 0–0.1)
BASOPHILS NFR BLD AUTO: 0 % (ref 0–1)
BUN SERPL-MCNC: 17 MG/DL (ref 5–25)
CALCIUM SERPL-MCNC: 8.9 MG/DL (ref 8.3–10.1)
CHLORIDE SERPL-SCNC: 109 MMOL/L (ref 100–108)
CO2 SERPL-SCNC: 27 MMOL/L (ref 21–32)
CREAT SERPL-MCNC: 1.07 MG/DL (ref 0.6–1.3)
EOSINOPHIL # BLD AUTO: 0.13 THOUSAND/ΜL (ref 0–0.61)
EOSINOPHIL NFR BLD AUTO: 0 % (ref 0–6)
ERYTHROCYTE [DISTWIDTH] IN BLOOD BY AUTOMATED COUNT: 14.2 % (ref 11.6–15.1)
FERRITIN SERPL-MCNC: 468 NG/ML (ref 8–388)
FOLATE SERPL-MCNC: 14.9 NG/ML (ref 3.1–17.5)
GFR SERPL CREATININE-BSD FRML MDRD: 75 ML/MIN/1.73SQ M
GLUCOSE P FAST SERPL-MCNC: 86 MG/DL (ref 65–99)
GLUCOSE SERPL-MCNC: 86 MG/DL (ref 65–140)
HCT VFR BLD AUTO: 37.8 % (ref 36.5–49.3)
HGB BLD-MCNC: 11.9 G/DL (ref 12–17)
IGA SERPL-MCNC: 53 MG/DL (ref 70–400)
IGG SERPL-MCNC: 467 MG/DL (ref 700–1600)
IGM SERPL-MCNC: 39 MG/DL (ref 40–230)
IMM GRANULOCYTES # BLD AUTO: 0.11 THOUSAND/UL (ref 0–0.2)
IMM GRANULOCYTES NFR BLD AUTO: 0 % (ref 0–2)
IRON SATN MFR SERPL: 26 %
IRON SERPL-MCNC: 62 UG/DL (ref 65–175)
LDH SERPL-CCNC: 156 U/L (ref 81–234)
LYMPHOCYTES # BLD AUTO: 26.91 THOUSANDS/ΜL (ref 0.6–4.47)
LYMPHOCYTES NFR BLD AUTO: 64 % (ref 14–44)
MCH RBC QN AUTO: 30.8 PG (ref 26.8–34.3)
MCHC RBC AUTO-ENTMCNC: 31.5 G/DL (ref 31.4–37.4)
MCV RBC AUTO: 98 FL (ref 82–98)
MONOCYTES # BLD AUTO: 10.44 THOUSAND/ΜL (ref 0.17–1.22)
MONOCYTES NFR BLD AUTO: 24 % (ref 4–12)
NEUTROPHILS # BLD AUTO: 5.04 THOUSANDS/ΜL (ref 1.85–7.62)
NEUTS SEG NFR BLD AUTO: 12 % (ref 43–75)
NRBC BLD AUTO-RTO: 0 /100 WBCS
PLATELET # BLD AUTO: 146 THOUSANDS/UL (ref 149–390)
PMV BLD AUTO: 11.3 FL (ref 8.9–12.7)
POTASSIUM SERPL-SCNC: 4.2 MMOL/L (ref 3.5–5.3)
RBC # BLD AUTO: 3.86 MILLION/UL (ref 3.88–5.62)
RETICS # AUTO: NORMAL 10*3/UL (ref 14356–105094)
RETICS # CALC: 1 % (ref 0.37–1.87)
SODIUM SERPL-SCNC: 140 MMOL/L (ref 136–145)
TIBC SERPL-MCNC: 240 UG/DL (ref 250–450)
VIT B12 SERPL-MCNC: 305 PG/ML (ref 100–900)
WBC # BLD AUTO: 42.75 THOUSAND/UL (ref 4.31–10.16)

## 2019-10-11 PROCEDURE — G8989 SELF CARE D/C STATUS: HCPCS

## 2019-10-11 PROCEDURE — 85045 AUTOMATED RETICULOCYTE COUNT: CPT | Performed by: INTERNAL MEDICINE

## 2019-10-11 PROCEDURE — 97165 OT EVAL LOW COMPLEX 30 MIN: CPT

## 2019-10-11 PROCEDURE — G8987 SELF CARE CURRENT STATUS: HCPCS

## 2019-10-11 PROCEDURE — 83550 IRON BINDING TEST: CPT | Performed by: INTERNAL MEDICINE

## 2019-10-11 PROCEDURE — G8988 SELF CARE GOAL STATUS: HCPCS

## 2019-10-11 PROCEDURE — G8978 MOBILITY CURRENT STATUS: HCPCS

## 2019-10-11 PROCEDURE — 82784 ASSAY IGA/IGD/IGG/IGM EACH: CPT | Performed by: INTERNAL MEDICINE

## 2019-10-11 PROCEDURE — 83615 LACTATE (LD) (LDH) ENZYME: CPT | Performed by: INTERNAL MEDICINE

## 2019-10-11 PROCEDURE — 82728 ASSAY OF FERRITIN: CPT | Performed by: INTERNAL MEDICINE

## 2019-10-11 PROCEDURE — 99233 SBSQ HOSP IP/OBS HIGH 50: CPT | Performed by: INTERNAL MEDICINE

## 2019-10-11 PROCEDURE — G8980 MOBILITY D/C STATUS: HCPCS

## 2019-10-11 PROCEDURE — 82607 VITAMIN B-12: CPT | Performed by: INTERNAL MEDICINE

## 2019-10-11 PROCEDURE — 85025 COMPLETE CBC W/AUTO DIFF WBC: CPT | Performed by: PHYSICIAN ASSISTANT

## 2019-10-11 PROCEDURE — 97161 PT EVAL LOW COMPLEX 20 MIN: CPT

## 2019-10-11 PROCEDURE — 73201 CT UPPER EXTREMITY W/DYE: CPT

## 2019-10-11 PROCEDURE — 99232 SBSQ HOSP IP/OBS MODERATE 35: CPT | Performed by: NURSE PRACTITIONER

## 2019-10-11 PROCEDURE — 83540 ASSAY OF IRON: CPT | Performed by: INTERNAL MEDICINE

## 2019-10-11 PROCEDURE — 82746 ASSAY OF FOLIC ACID SERUM: CPT | Performed by: INTERNAL MEDICINE

## 2019-10-11 PROCEDURE — 80048 BASIC METABOLIC PNL TOTAL CA: CPT | Performed by: INTERNAL MEDICINE

## 2019-10-11 RX ORDER — ACETAMINOPHEN 325 MG/1
650 TABLET ORAL EVERY 6 HOURS PRN
Status: DISCONTINUED | OUTPATIENT
Start: 2019-10-11 | End: 2019-10-12 | Stop reason: HOSPADM

## 2019-10-11 RX ADMIN — ACETAMINOPHEN 650 MG: 325 TABLET ORAL at 22:32

## 2019-10-11 RX ADMIN — CEFAZOLIN SODIUM 2000 MG: 2 SOLUTION INTRAVENOUS at 22:33

## 2019-10-11 RX ADMIN — SUCRALFATE 1000 MG: 1 SUSPENSION ORAL at 17:27

## 2019-10-11 RX ADMIN — ENOXAPARIN SODIUM 40 MG: 40 INJECTION SUBCUTANEOUS at 08:16

## 2019-10-11 RX ADMIN — IOHEXOL 85 ML: 350 INJECTION, SOLUTION INTRAVENOUS at 17:50

## 2019-10-11 RX ADMIN — SUCRALFATE 1000 MG: 1 SUSPENSION ORAL at 06:34

## 2019-10-11 RX ADMIN — ACETAMINOPHEN 650 MG: 325 TABLET ORAL at 02:42

## 2019-10-11 RX ADMIN — SUCRALFATE 1000 MG: 1 SUSPENSION ORAL at 11:55

## 2019-10-11 RX ADMIN — CEFAZOLIN SODIUM 2000 MG: 2 SOLUTION INTRAVENOUS at 15:36

## 2019-10-11 RX ADMIN — CEFAZOLIN SODIUM 2000 MG: 2 SOLUTION INTRAVENOUS at 06:34

## 2019-10-11 RX ADMIN — ACETAMINOPHEN 650 MG: 325 TABLET ORAL at 15:36

## 2019-10-11 RX ADMIN — SUCRALFATE 1000 MG: 1 SUSPENSION ORAL at 23:07

## 2019-10-11 RX ADMIN — PANTOPRAZOLE SODIUM 40 MG: 40 TABLET, DELAYED RELEASE ORAL at 06:34

## 2019-10-11 RX ADMIN — SUCRALFATE 1000 MG: 1 SUSPENSION ORAL at 02:24

## 2019-10-11 RX ADMIN — FINASTERIDE 5 MG: 5 TABLET, FILM COATED ORAL at 08:16

## 2019-10-11 NOTE — PHYSICAL THERAPY NOTE
Physical Therapy Evaluation     Patient's Name: Keira Chen    Admitting Diagnosis  Lymphocytosis [D72 820]  CLL (chronic lymphocytic leukemia) ([facility] ) [C91 10]  Cellulitis of left axilla [L03 112]  Axillary lymphadenitis [I88 9]    Problem List  Patient Active Problem List   Diagnosis    Acute upper respiratory infection    Chronic GERD    [facility]    Lymphocytosis    Axillary lymphadenitis       Past Medical History  Past Medical History:   Diagnosis Date    Abdominal pain     Epigastric pain     Gallbladder polyp     Leukocytosis     Lymphocytosis     Panic attacks     years ago    Rosacea        Past Surgical History  Past Surgical History:   Procedure Laterality Date    HEMORRHOID SURGERY      WA COLONOSCOPY FLX DX W/COLLJ SPEC WHEN PFRMD N/A 4/11/2016    Procedure: COLONOSCOPY;  Surgeon: Niecy Larson MD;  Location:  GI LAB; Service: Colorectal    [place] ESOPHAGOGASTRODUODENOSCOPY TRANSORAL DIAGNOSTIC N/A 3/31/2017    Procedure: ESOPHAGOGASTRODUODENOSCOPY (EGD); Surgeon: Farzana Dia MD;  Location: [facility] GI LAB; Service: Gastroenterology          10/11/19 1038   Note Type   Note type Eval only   Pain Assessment   Pain Assessment 0-10   Pain Score 4   Pain Type Acute pain   Pain Location Arm   Pain Orientation Left   Hospital Pain Intervention(s) Ambulation/increased activity;Repositioned; Emotional support;Distraction   Response to Interventions tolerated   Home Living   Type of [address] One level;Performs ADLs on one level;Elevator   Bathroom Shower/Tub Tub/shower unit   Bathroom Toilet Raised   Bathroom Equipment   (denies any)   Home Equipment   (denies any)   Prior Function   Level of [place] Independent with ADLs and functional mobility   Lives With Alone   ADL Assistance Independent   IADLs Independent   Falls in the last 6 months 0   Restrictions/Precautions   Weight Bearing Precautions Per Order No   Other Precautions Pain   General   Family/Caregiver Present No   Cognition   Overall Cognitive Status WFL   Arousal/Participation Alert   Attention Within functional limits   Orientation Level Oriented X4   Memory Within functional limits   Following Commands Follows all commands and directions without difficulty   Comments pt pleasant and agreeable to ambulation   RLE Assessment   RLE Assessment WNL   LLE Assessment   LLE Assessment WNL   Coordination   Movements are Fluid and Coordinated 1   Bed Mobility   Supine to Sit 7  Independent   Sit to Supine 7  Independent   Transfers   Sit to Stand 7  Independent   Stand to Sit 7  Independent   Ambulation/Elevation   Gait pattern WNL   Gait Assistance 7  Independent   Assistive Device None   Distance 400ft   Stair Management Assistance Not tested   Balance   Static Sitting Normal   Static Standing Normal   Ambulatory Good   Endurance Deficit   Endurance Deficit Yes   Endurance Deficit Description pain in L arm   Activity Tolerance   Activity Tolerance Patient limited by pain   Medical Staff Made Aware OT   Nurse Made Aware RN cleared pt for therapy   Assessment   Prognosis Good   Problem List Decreased mobility; Decreased endurance   Assessment Pt is 62 y o  male seen for PT evaluation s/p admit to [facility] on 10/9/2019 w/ Axillary lymphadenitis  PT consulted to assess pt's functional mobility and d/c needs  Order placed for PT eval and tx, w/ activity as tolerated order  Comorbidities affecting pt's physical performance at time of assessment include: chronic lymphocytic leukemia  PTA, pt was independent w/ all functional mobility w/ no AD and lives alone in one level apt w/ elevator access  Personal factors affecting pt at time of IE include: limited home support  Please find objective findings from PT assessment regarding body systems outlined above with impairments and limitations including decreased ROM, decreased endurance, pain and decreased functional mobility tolerance  Pt performed all bed mobility and functional transfers independently and ambulated 400ft independently w/ no AD  The following objective measures performed on IE also reveal limitations: Barthel Index: 90/100  Pt's clinical presentation is currently evolving seen in pt's presentation of recent admission for axillary lymphadenitis requiring medical attention and recent decline in function as compared to baseline 2* pain  Pt appears to be functioning at baseline level with functional mobility; therefore, requires no immediate acute skilled PT services at this time  Pt with no further questions or concerns regarding PT services  Will D/C PT at this time  Please re-consult if pt's medical status changes  From PT/mobility standpoint, recommendation at time of d/c would be home independently w/ no skilled acute PT needs pending progress in order to facilitate return to PLOF     Goals   Patient Goals to have less pain during activity   Recommendation   Recommendation Home with family support   PT - OK to Discharge Yes   Modified Joshua Scale   Modified Екатерина Scale 1   Barthel Index   Feeding 10   Bathing 5   Grooming Score 5   Dressing Score 10   Bladder Score 10   Bowels Score 10   Toilet Use Score 10   Transfers (Bed/Chair) Score 15   Mobility (Level Surface) Score 15   Stairs Score 0   Barthel Index Score 90     Mallorie Sherwood, PT, DPT

## 2019-10-11 NOTE — ASSESSMENT & PLAN NOTE
· Patient with chronic leukocytosis in the 50s  At baseline  No active treatment at this time   · Patient is followed by Dr Ewing Needs  · Oncology consultation appreciated, no further recommendations  Antibiotics as above

## 2019-10-11 NOTE — ED PROVIDER NOTES
History  Chief Complaint   Patient presents with    Cyst     "I went to my doctor and it looks like there are some cysts under my left armpit, I was sent home with antibiotics and told to come in if it worsened  Today the pain just got so bad I had to come in "      Patient is a 17-year-old male with history of CLL not currently on treatment that presents with axillary swelling/erythema/pain  Patient says that since this past Saturday, he has had worsening left-sided erythema/swelling/pain in his axilla  He has also had minor symptoms on his right side as well  Patient was diagnosed with axillary lymphadenitis 2 days ago and was started on Keflex  Patient has been taking his Keflex as ordered but despite antibiotics has been getting worsening erythema/swelling/pain in the area  He denies constitutional symptoms including lethargy, weakness, fevers, chills  He has been eating and drinking normally and denies p o  Intolerance  He denies any chest pain, dyspnea, abdominal pain  He denies nausea, vomiting  He denies diarrhea, melena hematochezia  Denies any hematuria or dysuria  No known history of skin break in the area  Prior to Admission Medications   Prescriptions Last Dose Informant Patient Reported? Taking?    Alum Hydroxide-Mag Carbonate (GAVISCON EXTRA STRENGTH) 557-780 MG/10ML SUSP 10/9/2019 at Unknown time Self Yes Yes   Sig: Take by mouth   cephalexin (KEFLEX) 500 mg capsule 10/9/2019 at 1200  No Yes   Sig: Take 1 capsule (500 mg total) by mouth every 6 (six) hours for 10 days   finasteride (PROPECIA) 1 MG tablet  Self Yes Yes   Sig: Take 1 mg by mouth daily   omeprazole (PriLOSEC) 20 mg delayed release capsule 10/9/2019 at Unknown time Self No Yes   Sig: Take 1 capsule (20 mg total) by mouth daily   ranitidine (ZANTAC) 75 MG tablet 10/9/2019 at Unknown time Self Yes Yes   Sig: Take 75 mg by mouth 2 (two) times a day      Facility-Administered Medications: None       Past Medical History: Diagnosis Date    Abdominal pain     Epigastric pain     Gallbladder polyp     Leukocytosis     Lymphocytosis     Panic attacks     years ago    Rosacea        Past Surgical History:   Procedure Laterality Date    HEMORRHOID SURGERY      AR COLONOSCOPY FLX DX W/COLLJ SPEC WHEN PFRMD N/A 4/11/2016    Procedure: COLONOSCOPY;  Surgeon: Page Perez MD;  Location:  GI LAB; Service: Colorectal    AR ESOPHAGOGASTRODUODENOSCOPY TRANSORAL DIAGNOSTIC N/A 3/31/2017    Procedure: ESOPHAGOGASTRODUODENOSCOPY (EGD); Surgeon: Felicia Diaz MD;  Location: [facility] GI [facility]; Service: Gastroenterology       History reviewed  No pertinent family history  I have reviewed and agree with the history as documented  Social History     Tobacco Use    Smoking status: Never Smoker    Smokeless tobacco: Never Used   Substance Use Topics    Alcohol use: Yes     Comment: socially    Drug use: No        Review of Systems   Constitutional: Negative for chills, diaphoresis, fatigue and fever  HENT: Negative for drooling, facial swelling, sore throat and trouble swallowing  Eyes: Negative for photophobia  Respiratory: Negative for cough, choking, chest tightness, shortness of breath, wheezing and stridor  Cardiovascular: Negative for chest pain, palpitations and leg swelling  Gastrointestinal: Negative for abdominal distention, abdominal pain, diarrhea, nausea and vomiting  Genitourinary: Negative for dysuria  Musculoskeletal: Negative for back pain, neck pain and neck stiffness  Skin: Positive for rash  Negative for color change and pallor  Neurological: Negative for dizziness, speech difficulty, weakness, light-headedness, numbness and headaches  Hematological: Negative for adenopathy  Psychiatric/Behavioral: Negative for agitation  All other systems reviewed and are negative        Physical Exam  ED Triage Vitals   Temperature Pulse Respirations Blood Pressure SpO2   10/09/19 1928 10/09/19 1928 10/09/19 1928 10/09/19 1928 10/09/19 1928   99 3 °F (37 4 °C) 78 18 130/86 98 %      Temp Source Heart Rate Source Patient Position - Orthostatic VS BP Location FiO2 (%)   10/09/19 1928 10/09/19 1928 10/09/19 1928 10/09/19 1928 --   Oral Monitor Sitting Right arm       Pain Score       10/09/19 2353       4             Orthostatic Vital Signs  Vitals:    10/10/19 0900 10/10/19 1000 10/10/19 1330 10/10/19 1522   BP: 118/55 103/58 133/60 97/59   Pulse: 66 70 69 66   Patient Position - Orthostatic VS:   Lying        Physical Exam   Constitutional: He is oriented to person, place, and time  He appears well-developed and well-nourished  No distress  HENT:   Head: Normocephalic  Eyes: Pupils are equal, round, and reactive to light  EOM are normal    Neck: Normal range of motion  Neck supple  Cardiovascular: Normal rate, regular rhythm, normal heart sounds and intact distal pulses  Exam reveals no gallop and no friction rub  No murmur heard  Pulmonary/Chest: Effort normal and breath sounds normal    Abdominal: Soft  Bowel sounds are normal  He exhibits no distension  There is no tenderness  There is no guarding  Musculoskeletal: Normal range of motion  Left axilla:  Patient with erythema, swelling, warmth, tenderness in the left axilla  Bedside ultrasound showed reactive lymph node discrete abscess  Cellulitic skin seen    Right axilla:  Minor erythema/swelling/tenderness over papule   Neurological: He is alert and oriented to person, place, and time  No cranial nerve deficit or sensory deficit  He exhibits normal muscle tone  Skin: Capillary refill takes less than 2 seconds  Psychiatric: He has a normal mood and affect  His behavior is normal  Judgment and thought content normal    Vitals reviewed        ED Medications  Medications   aluminum-magnesium hydroxide-simethicone (MYLANTA) 200-200-20 mg/5 mL oral suspension 5 mL (has no administration in time range)   pantoprazole (PROTONIX) EC tablet 40 mg (40 mg Oral Given 10/10/19 0535)   sodium chloride 0 9 % infusion (50 mL/hr Intravenous New Bag 10/10/19 1542)   enoxaparin (LOVENOX) subcutaneous injection 40 mg (40 mg Subcutaneous Given 10/10/19 0811)   ceFAZolin (ANCEF) IVPB (premix) 2,000 mg (2,000 mg Intravenous New Bag 10/10/19 2237)   sucralfate (CARAFATE) oral suspension 1,000 mg (1,000 mg Oral Given 10/10/19 1730)   finasteride (PROSCAR) tablet 5 mg (5 mg Oral Given 10/10/19 1730)   clindamycin (CLEOCIN) IVPB (premix) 600 mg (0 mg Intravenous Stopped 10/9/19 2150)   morphine (PF) 4 mg/mL injection 4 mg (4 mg Intravenous Given 10/10/19 0039)   influenza vaccine, recombinant, quadrivalent (FLUBLOK) IM injection 0 5 mL (0 5 mL Intramuscular Given 10/10/19 1730)       Diagnostic Studies  Results Reviewed     Procedure Component Value Units Date/Time    Uric acid [[id number]]  (Normal) Collected:  10/10/19 0511    Lab Status:  Final result Specimen:  Blood from Arm, Left Updated:  10/10/19 1731     Uric Acid 4 3 mg/dL     CBC and differential [[id number]]  (Abnormal) Collected:  10/10/19 1017    Lab Status:  Final result Specimen:  Blood from Arm, Left Updated:  10/10/19 1140     WBC 39 26 Thousand/uL      RBC 3 49 Million/uL      Hemoglobin 10 7 g/dL      Hematocrit 33 7 %      MCV 97 fL      MCH 30 7 pg      MCHC 31 8 g/dL      RDW 14 2 %      MPV 10 8 fL      Platelets 305 Thousands/uL      nRBC 0 /100 WBCs     Narrative: Albumin Smear  This is an appended report  These results have been appended to a previously verified report  CBC and differential [[id number]]  (Abnormal) Collected:  10/09/19 2037    Lab Status:  Final result Specimen:  Blood from Arm, Left Updated:  10/10/19 1025     WBC 50 79 Thousand/uL      RBC 3 72 Million/uL      Hemoglobin 11 6 g/dL      Hematocrit 35 8 %      MCV 96 fL      MCH 31 2 pg      MCHC 32 4 g/dL      RDW 14 1 %      MPV 11 0 fL      Platelets 850 Thousands/uL      nRBC 0 /100 WBCs     Narrative:        This is an appended report  These results have been appended to a previously verified report  Path Slide Review [[id number]] Collected:  10/09/19 2037    Lab Status:  Final result Specimen:  Blood from Arm, Left Updated:  10/10/19 1536     Path Review Absolute lymphocytosis morphologically compatible with patient's known chronic lymphocytic leukemia  Prolymphocytes comprise approximately 20% of total cellularity  Red cells are normocytic and normochromic  Platelet estimate agrees with direct count      Procalcitonin [[id number]]  (Normal) Collected:  10/10/19 0511    Lab Status:  Final result Specimen:  Blood from Arm, Left Updated:  10/10/19 0707     Procalcitonin 0 22 ng/ml     Comprehensive metabolic panel [[id number]]  (Abnormal) Collected:  10/10/19 0511    Lab Status:  Final result Specimen:  Blood from Arm, Left Updated:  10/10/19 0606     Sodium 143 mmol/L      Potassium 4 3 mmol/L      Chloride 109 mmol/L      CO2 29 mmol/L      ANION GAP 5 mmol/L      BUN 13 mg/dL      Creatinine 1 03 mg/dL      Glucose 87 mg/dL      Glucose, Fasting 87 mg/dL      Calcium 8 5 mg/dL      AST 10 U/L      ALT 16 U/L      Alkaline Phosphatase 78 U/L      Total Protein 6 1 g/dL      Albumin 3 2 g/dL      Total Bilirubin 0 70 mg/dL      eGFR 78 ml/min/1 73sq m     Narrative:       [place] guidelines for Chronic Kidney Disease (CKD):     Stage 1 with normal or high GFR (GFR > 90 mL/min/1 73 square meters)    Stage 2 Mild CKD (GFR = 60-89 mL/min/1 73 square meters)    Stage 3A Moderate CKD (GFR = 45-59 mL/min/1 73 square meters)    Stage 3B Moderate CKD (GFR = 30-44 mL/min/1 73 square meters)    Stage 4 Severe CKD (GFR = 15-29 mL/min/1 73 square meters)    Stage 5 End Stage CKD (GFR <15 mL/min/1 73 square meters)  Note: GFR calculation is accurate only with a steady state creatinine    Magnesium [[id number]]  (Normal) Collected:  10/10/19 0511    Lab Status:  Final result Specimen:  Blood from Arm, Left Updated:  10/10/19 0606     Magnesium 1 9 mg/dL     Phosphorus [[id number]]  (Normal) Collected:  10/10/19 0511    Lab Status:  Final result Specimen:  Blood from Arm, Left Updated:  10/10/19 0606     Phosphorus 3 5 mg/dL     Procalcitonin [[id number]]  (Normal) Collected:  10/09/19 2339    Lab Status:  Final result Specimen:  Blood from Arm, Left Updated:  10/10/19 0043     Procalcitonin 0 22 ng/ml     Platelet count [[id number]]  (Abnormal) Collected:  10/09/19 2338    Lab Status:  Final result Specimen:  Blood from Arm, Left Updated:  10/09/19 2354     Platelets 995 Thousands/uL      MPV 10 5 fL     Blood culture [[id number]] Collected:  10/09/19 2340    Lab Status: In process Specimen:  Blood from Arm, Left Updated:  10/09/19 2346    Blood culture [[id number]] Collected:  10/09/19 2339    Lab Status:   In process Specimen:  Blood from Arm, Right Updated:  10/09/19 2346    Comprehensive metabolic panel [[id number]]  (Abnormal) Collected:  10/09/19 2037    Lab Status:  Final result Specimen:  Blood from Arm, Left Updated:  10/09/19 2124     Sodium 141 mmol/L      Potassium 4 3 mmol/L      Chloride 108 mmol/L      CO2 26 mmol/L      ANION GAP 7 mmol/L      BUN 13 mg/dL      Creatinine 1 06 mg/dL      Glucose 119 mg/dL      Calcium 8 8 mg/dL      AST 12 U/L      ALT 19 U/L      Alkaline Phosphatase 88 U/L      Total Protein 6 5 g/dL      Albumin 3 3 g/dL      Total Bilirubin 0 70 mg/dL      eGFR 75 ml/min/1 73sq m     Narrative:       [place] guidelines for Chronic Kidney Disease (CKD):     Stage 1 with normal or high GFR (GFR > 90 mL/min/1 73 square meters)    Stage 2 Mild CKD (GFR = 60-89 mL/min/1 73 square meters)    Stage 3A Moderate CKD (GFR = 45-59 mL/min/1 73 square meters)    Stage 3B Moderate CKD (GFR = 30-44 mL/min/1 73 square meters)    Stage 4 Severe CKD (GFR = 15-29 mL/min/1 73 square meters)    Stage 5 End Stage CKD (GFR <15 mL/min/1 73 square meters)  Note: GFR calculation is accurate only with a steady state creatinine                 No orders to display         Procedures  Procedures        ED Course                               MDM  Number of Diagnoses or Management Options  Cellulitis of left axilla:   Diagnosis management comments: Patient is a 70-year-old male with history of CLL not currently on treatment that presents with axillary swelling/erythema/tenderness/warmth consistent with cellulitis has failed outpatient management  Patient was started on IV antibiotics here in the emergency department he will be admitted to the hospital for further workup and management  Amount and/or Complexity of Data Reviewed  Clinical lab tests: ordered and reviewed  Tests in the radiology section of CPT®: ordered and reviewed    Risk of Complications, Morbidity, and/or Mortality  Presenting problems: low  Diagnostic procedures: low  Management options: low    Patient Progress  Patient progress: improved      Disposition  Final diagnoses:   Cellulitis of left axilla     Time reflects when diagnosis was documented in both MDM as applicable and the Disposition within this note     Time User Action Codes Description Comment    10/9/2019  9:48 PM Eunice Pineda Add [L03 112] Cellulitis of left axilla     10/9/2019 10:29 PM Steve Bernard Add [C91 10] CLL (chronic lymphocytic leukemia) ([facility] [address] )     10/9/2019 10:29 PM Ursula Bowling Add [G22 494] Lymphocytosis       ED Disposition     ED Disposition Condition Date/Time Comment    Admit Stable Wed Oct 9, 2019  9:48 PM Case was discussed with LANCE and the patient's admission status was agreed to be Admission Status: observation status to the service of Dr David Delong           Follow-up Information    None         Current Discharge Medication List      CONTINUE these medications which have NOT CHANGED    Details   Alum Hydroxide-Mag Carbonate (GAVISCON EXTRA STRENGTH) 508-475 MG/10ML SUSP Take by mouth      cephalexin (KEFLEX) 500 mg capsule Take 1 capsule (500 mg total) by mouth every 6 (six) hours for 10 days  Qty: 40 capsule, Refills: 0    Associated Diagnoses: Axillary lymphadenitis      finasteride (PROPECIA) 1 MG tablet Take 1 mg by mouth daily      omeprazole (PriLOSEC) 20 mg delayed release capsule Take 1 capsule (20 mg total) by mouth daily  Qty: 90 capsule, Refills: 3    Associated Diagnoses: Chronic GERD      ranitidine (ZANTAC) 75 MG tablet Take 75 mg by mouth 2 (two) times a day           No discharge procedures on file  ED Provider  Attending physically available and evaluated Brynn Hatchet I managed the patient along with the ED Attending      Electronically Signed by         Medardo Bruner MD  10/11/19 7205

## 2019-10-11 NOTE — OCCUPATIONAL THERAPY NOTE
[address]  Evaluation     Patient Name: Gregor Habermann KUMFU'K Date: 10/11/2019  Problem List  Principal Problem:    Axillary lymphadenitis  Active Problems:    Chronic GERD    CLL (chronic lymphocytic leukemia) ([facility] [address] )    Past Medical History  Past Medical History:   Diagnosis Date    Abdominal pain     Epigastric pain     Gallbladder polyp     Leukocytosis     Lymphocytosis     Panic attacks     years ago    Rosacea      Past Surgical History  Past Surgical History:   Procedure Laterality Date    HEMORRHOID SURGERY      [place] COLONOSCOPY FLX DX W/COLLJ SPEC WHEN PFRMD N/A 4/11/2016    Procedure: COLONOSCOPY;  Surgeon: Bethany Osorio MD;  Location:  GI LAB; Service: Colorectal    [place] ESOPHAGOGASTRODUODENOSCOPY TRANSORAL DIAGNOSTIC N/A 3/31/2017    Procedure: ESOPHAGOGASTRODUODENOSCOPY (EGD); Surgeon: Bradley Jeffries MD;  Location: [facility] GI LAB; Service: Gastroenterology           10/11/19 1039   Note Type   Note type Eval only   Restrictions/Precautions   Weight Bearing Precautions Per Order No   Pain Assessment   Pain Assessment 0-10   Pain Score 4  (during activity)   Pain Type Acute pain   Pain Location Arm   Pain Orientation Left   Hospital Pain Intervention(s) Repositioned; Rest   Response to Interventions tolerated   Home Living   Type of Home Apartment   Home Layout Performs ADLs on one level;Elevator   Bathroom Shower/Tub Tub/shower unit   Bathroom Toilet Raised   Bathroom Equipment   (none)   Home Equipment   (none)   Prior Function   Level of [place] Independent with ADLs and functional mobility   Lives With Alone   ADL Assistance Independent   IADLs Independent   Falls in the last 6 months 0   Lifestyle   Autonomy At baseline pt was completing all ADLs/IADLs IND, ambulating IND w/o an AD, (+) driving, (+) working as professor      Reciprocal Relationships supportive friends   Service to Others works as an    Intrinsic Gratification pt enjoys working, reading   Psychosocial   Psychosocial (WDL) WDL   Subjective   Subjective "I feel pretty good  It's just so much swelling"   ADL   Eating Assistance 7  Independent   Grooming Assistance 7  Independent   UB Bathing Assistance 6  [address]  Modified independent   UB Dressing Deficit Increased time to complete  (able to don hospital gown and complete fasteners)   LB Dressing Assistance 7  [address]  7  Independent   Bed Mobility   Supine to Sit 7  Independent   Sit to Supine 7  Independent   Transfers   Sit to Stand 7  Independent   Stand to Sit 7  Independent   Functional Mobility   Functional Mobility 7  Independent   Additional Comments demo ability to ambulate household distances   Balance   Static Sitting Normal   Dynamic Sitting Normal   Static Standing Normal   Dynamic Standing Normal   Activity Tolerance   Activity Tolerance Patient tolerated treatment well   Medical Staff Made Aware PT   Nurse Made Aware Spoke to RN - pt appropriate to be seen   RUE Assessment   RUE Assessment WNL  (pt is R handed)   LUE Assessment   LUE Assessment X  ( 5/5)   LUE Strength   L Shoulder Flexion   (minimally limited AROM 2' pain & swelling)   L Elbow Flexion 4/5  (limited by pain)   L Elbow Extension 4/5  (limited by pain)   Hand Function   Gross Motor Coordination Functional   Fine Motor Coordination Functional   Cognition   Overall Cognitive Status WFL   Arousal/Participation Alert; Cooperative   Attention Within functional limits   Orientation Level Oriented X4   Memory Within functional limits   Following Commands Follows all commands and directions without difficulty   Assessment   Assessment Pt is a 62 y o  male who [facility] with Left Axillary lymphadenitis   Pt's problem list also includes PMH of chronic lymphocytic leukemia, chronic GERD, lymphocytosis   At baseline pt was completing all ADLs/IADLs IND, ambulating IND w/o an AD, (+) driving, (+) working as professor  Pt lives alone in an apartment with elevator access  Currently pt requires MOD IND-IND for overall ADLS and for functional mobility/transfers  Pt with minimally limited LUE shoulder flexion/abduction AROM 2' pain and axillary swelling - currently not functionally limiting pt  Pt appears to be functioning at/near baseline level  Pt reports no further questions/concerns  Recommend pt continue functional use of LUE with rest breaks as needed  From OT standpoint, recommend pt D/C HOME  No further acute OT services recommended at this time     Goals   Patient Goals to have less swelling   Plan   OT Frequency Eval only   Recommendation   OT Discharge Recommendation Home independent   Barthel Index   Feeding 10   Bathing 5   Grooming Score 5   Dressing Score 10   Bladder Score 10   Bowels Score 10   Toilet Use Score 10   Transfers (Bed/Chair) Score 15   Mobility (Level Surface) Score 15   Stairs Score 0   Barthel Index Score 90   Modified Boundary Scale   Modified Boundary Scale 1        Jessica Hugo, OT

## 2019-10-11 NOTE — ASSESSMENT & PLAN NOTE
· Reporting increased symptoms at this time improved with Carafate which was added on arrival   Continue home PPI    · Recommended follow up with Dr Maximo Meyers for further treatment

## 2019-10-11 NOTE — PROGRESS NOTES
[facility] [place] [facility]    Progress Note - Wilfredo Finley 1960, 62 y o  male MRN: [id number]    Unit/Bed#: [facility] 913-01 Encounter: [id number]    Primary Care Provider: Nevin Champagne MD   Date and time admitted to hospital: 10/9/2019  7:30 PM    * Axillary lymphadenitis  Assessment & Plan  · POA, Patient treated for cellulitis as outpatient without improvement  Originally had "scratch" which progressed to cellulitis now with lymphadenitis  Continues with pain and hard, cystic area in his left axilla  No drainage noted  WBCs at baseline  No other sepsis criteria noted   · Id consult pending  Will continue with intravenous Ancef pending further recommendations  · BC negative at 24 hours  Follow end points  · Warm compresses   · Recommend CT scan of axillary area as well as surgical evaluation  Will discuss with ID  · Trend temps, CBC  · Procalcitonin stable at 0 22      CLL (chronic lymphocytic leukemia) (HCC)  Assessment & Plan  · Patient with chronic leukocytosis in the 50s  At baseline  No active treatment at this time   · Patient is followed by Dr Jacy Hooker  · Oncology consultation appreciated, no further recommendations  Antibiotics as above  Chronic GERD  Assessment & Plan  · Reporting increased symptoms at this time improved with Carafate which was added on arrival   Continue home PPI  · Recommended follow up with Dr Marin Angelucci for further treatment     Pharmacologic: Enoxaparin (Lovenox)  Mechanical VTE Prophylaxis in Place: Yes    Patient Centered Rounds: I have performed bedside rounds with nursing staff today  Discussions with Specialists or Other Care Team Provider: nursing, case management, ID    Education and Discussions with Family / Patient: patient     Time Spent for Care: 30 minutes  More than 50% of total time spent on counseling and coordination of care as described above  Current Length of Stay: 0 day(s)    Current Patient Status: Inpatient   Certification Statement:  The patient, admitted on an observation basis, will now require > 2 midnight hospital stay due to IV abx, serial monitoring of lymphadenitis/cellulitis to monitor for improvement    Discharge Plan / Estimated Discharge Date: not medically stable, anticipate need for several more days for IV abx  Code Status: Level 1 - Full Code      Subjective:   Patient states that mobility and pain is improving  States that swollen lymph node seems to be a bit worse today but erythema has improved  No fevers, chills  Feels fatigued overall  Heartburn seems to be improved with Carafate  Objective:     Vitals:   Temp (24hrs), Av 7 °F (36 5 °C), Min:97 4 °F (36 3 °C), Max:97 9 °F (36 6 °C)    Temp:  [97 4 °F (36 3 °C)-97 9 °F (36 6 °C)] 97 4 °F (36 3 °C)  HR:  [57-69] 57  Resp:  [16-18] 18  BP: ()/(59-62) 108/62  SpO2:  [96 %-100 %] 99 %  Body mass index is 22 77 kg/m²  Input and Output Summary (last 24 hours): Intake/Output Summary (Last 24 hours) at 10/11/2019 1110  Last data filed at 10/11/2019 0900  Gross per 24 hour   Intake 477 ml   Output 1550 ml   Net -1073 ml       Physical Exam:     Physical Exam   Constitutional: He is oriented to person, place, and time  No distress  HENT:   Head: Normocephalic  Eyes: Conjunctivae are normal    Neck: Normal range of motion  Cardiovascular: Normal rate  Pulmonary/Chest: Breath sounds normal    Abdominal: Bowel sounds are normal    Musculoskeletal: Normal range of motion  He exhibits no edema  Neurological: He is alert and oriented to person, place, and time  Skin: Skin is warm and dry  Left axilla with large cystic lymph node  Tender to touch  Erythema noted however improving  Psychiatric: He has a normal mood and affect  Nursing note and vitals reviewed        Additional Data:     Labs:    Results from last 7 days   Lab Units 10/11/19  0604   WBC Thousand/uL 42 75*   HEMOGLOBIN g/dL 11 9*   HEMATOCRIT % 37 8   PLATELETS Thousands/uL 146* NEUTROS PCT % 12*   LYMPHS PCT % 64*   MONOS PCT % 24*   EOS PCT % 0     Results from last 7 days   Lab Units 10/11/19  0604 10/10/19  0511   POTASSIUM mmol/L 4 2 4 3   CHLORIDE mmol/L 109* 109*   CO2 mmol/L 27 29   BUN mg/dL 17 13   CREATININE mg/dL 1 07 1 03   CALCIUM mg/dL 8 9 8 5   ALK PHOS U/L  --  78   ALT U/L  --  16   AST U/L  --  10             Recent Cultures (last 7 days):     Results from last 7 days   Lab Units 10/09/19  2340 10/09/19  2339   BLOOD CULTURE  No Growth at 24 hrs  No Growth at 24 hrs  Last 24 Hours Medication List:     Current Facility-Administered Medications:  acetaminophen 650 mg Oral Q6H PRN Naz Diaz PA-C    aluminum-magnesium hydroxide-simethicone 5 mL Oral Q4H PRN Hetul Rodríguez, DO    cefazolin 2,000 mg Intravenous Q8H Hetul Rodríguez, DO Last Rate: 2,000 mg (10/11/19 0634)   enoxaparin 40 mg Subcutaneous Daily Hetul Rodríguez, DO    finasteride 5 mg Oral Daily Ismael Martinez PA-C    pantoprazole 40 mg Oral Early Morning Hetul Rodríguez, DO    sucralfate 1,000 mg Oral Q6H [facility] Ismael Acuña PA-C         Today, Patient Was Seen By: ELISABET Canchola    ** Please Note: Dragon 360 Dictation voice to text software may have been used in the creation of this document   **

## 2019-10-11 NOTE — PROGRESS NOTES
Progress Note - Infectious Disease   Keira Hemp 62 y o  male MRN: [id number]  Unit/Bed#: [facility] 913-01 Encounter: [id number]      Impression/Plan:  1  Left axillary cellulitis with lymphadenitis  Suspected cause of cellulitis from recent scratch or change in deodorant which subsequently progressed to lymphadenitis  Poor response to po antibiotics  Improvement of cellulitis on IV therapy  More bulbous area towards medial axilla with no significant improvement in size, possibly increasing in size   Continue with IV ancef for now, will discuss further with attending   CT with contrast recommended given no improvement in size of more bulbous central mass, will discuss with attending  Continue trending fever curve/VS   Blood culture no growth 24hrs   Oncology evaluation reviewed; will follow-up with labs   Serial exams and maintain heat at site   Possible transition to PO antibiotic in future    2  Immunosuppression due to CLL and chronic leukocytosis, baseline around 50  Currently not on chemotherapy  Oncology evalutation in process  Antibiotics:  IV ancef day #2    Subjective:  Patient doing ok today, he did not sleep well last night due to increased size of left underarm mass  He can now raise his arm overhead but is concerned that the size of the mass is increasing  Patient has no fever, chills, sweats; no nausea, vomiting, diarrhea; no cough, shortness of breath; no pain  REAL O/N  Objective:  Vitals:  Temp:  [97 4 °F (36 3 °C)-97 9 °F (36 6 °C)] 97 4 °F (36 3 °C)  HR:  [57-70] 57  Resp:  [16-18] 18  BP: ()/(58-62) 108/62  SpO2:  [95 %-100 %] 99 %  Temp (24hrs), Av 7 °F (36 5 °C), Min:97 4 °F (36 3 °C), Max:97 9 °F (36 6 °C)  Current: Temperature: (!) 97 4 °F (36 3 °C)    Physical Exam:   General Appearance:  Alert, interactive, nontoxic, no acute distress  Throat: Oropharynx moist without lesions      Lungs:   Clear to auscultation bilaterally; no wheezes, rhonchi or rales; respirations unlabored   Heart:  RRR; no murmur, rub or gallop   Abdomen:   Soft, non-tender, non-distended, positive bowel sounds  Extremities: No clubbing, cyanosis or edema  Able to reach left arm over head  Skin: Left axilla bulbous area/mass medially/more centrally has increased in size when compared to yesterday  No fluctuance or active drainage  Erythema to left axilla mainly resolved except for directly over palpable lymph node  Palpable lymph nodes bilateral axillas, L>R  No new rashes or lesions  No draining wounds noted  Labs, Imaging, & Other studies:   All pertinent labs and imaging studies were personally reviewed  Results from last 7 days   Lab Units 10/11/19  0604 10/10/19  1017 10/09/19  2338 10/09/19  2037   WBC Thousand/uL 42 75* 39 26*  --  50 79*   HEMOGLOBIN g/dL 11 9* 10 7*  --  11 6*   PLATELETS Thousands/uL 146* 120* 120* 126*     Results from last 7 days   Lab Units 10/11/19  0604 10/10/19  0511 10/09/19  2037 10/07/19  1439   POTASSIUM mmol/L 4 2 4 3 4 3 4 6   CHLORIDE mmol/L 109* 109* 108 106   CO2 mmol/L 27 29 26 30   BUN mg/dL 17 13 13 13   CREATININE mg/dL 1 07 1 03 1 06 1 16   EGFR ml/min/1 73sq m 75 78 75 68   CALCIUM mg/dL 8 9 8 5 8 8 8 5   AST U/L  --  10 12 21   ALT U/L  --  16 19 26   ALK PHOS U/L  --  78 88 102     Results from last 7 days   Lab Units 10/09/19  2340 10/09/19  2339   BLOOD CULTURE  No Growth at 24 hrs  No Growth at 24 hrs

## 2019-10-11 NOTE — ASSESSMENT & PLAN NOTE
· POA, Patient treated for cellulitis as outpatient without improvement  Originally had "scratch" which progressed to cellulitis now with lymphadenitis  Continues with pain and hard, cystic area in his left axilla  No drainage noted  WBCs at baseline  No other sepsis criteria noted   · Id consult pending  Will continue with intravenous Ancef pending further recommendations  · BC negative at 24 hours  Follow end points  · Warm compresses   · Recommend CT scan of axillary area as well as surgical evaluation  Will discuss with ID    · Trend temps, CBC  · Procalcitonin stable at 0 22

## 2019-10-11 NOTE — SOCIAL WORK
Met with pt and explained CM program/CM role  Resides alone in an apartment, elevator to reach, bed/bathroom main floor  Independent prior to admission for ADL's/ambulation  He drives  PCP Dr Corona Sabillon  Has prescription plan, uses [employer]  Denies utilization DME/HHC/IP Rehab  Denies mental health illness, IP or OP psyche care, drug and/or alcohol abuse  Primary contact is Javan Sanchez [place] [id number] , [phone]  No POA/ has a living will, requested copy  Car here at [facility], will drive self home    CM reviewed d/c planning process including the following: identifying help at home, patient preference for d/c planning needs, Discharge Lounge, [facility] Meds to Bed program, availability of treatment team to discuss questions or concerns patient and/or family may have regarding understanding medications and recognizing signs and symptoms once discharged  CM also encouraged patient to follow up with all recommended appointments after discharge  Patient advised of importance for patient and family to participate in managing patients medical well being  Patient/caregiver received discharge checklist  Content reviewed  Patient/caregiver encouraged to participate in discharge plan of care prior to discharge home

## 2019-10-11 NOTE — UTILIZATION REVIEW
Continued Stay Review    Date: 10-11-19                      Current Patient Class: observation  10-9-19 0125  Current Level of Care: med surg  CHANGED TO INPATIENT 10-11-19  1055 FOR CONTINUED TREATMENT OF CELLULITIS IN IMMUNOSUPPRESSED PATIENT     Inpatient Admission Once     Transfer Service: General Medicine       Question Answer   Admitting Physician Nathanael Villarreal of Care Med Surg   Estimated length of stay More than 2 Midnights   Certification I certify that inpatient services are medically necessary for this patient for a duration of greater than two midnights  See H&P and MD Progress Notes for additional information about the patient's course of treatment  HPI:59 y o  male initially admitted on  9-13-19  For infected left axilla and possibly infected left axilla lymph node  Immunosuppressed from CLL  Not currently receiving chemotherapy  Assessment/Plan:      Left arm pain 5/10 today  Continue iv ancef  Hematology oncology consult completed for CLL   Wbc  Rising to 39 26 to   42 75  (baseline wbc  = 50 ) Plan to obtain IgG levels  LDH and uric acid and make further recommendations based on results  Infectious disease considering  formal ultra sound today  Continue warm compresses  New order for CT of left shoulder and possible consult to general surgery based on results  Pertinent Labs/Diagnostic Results:       Left axilla bedside ultrasound: larger mass represented lymph node measuring approximately 2 5x2 0cm   Smaller mass demonstrating cobblestoning consistent with cellulitis without abscess    Results from last 7 days   Lab Units 10/11/19  0604 10/10/19  1017 10/09/19  2338 10/09/19  2037 10/07/19  1439   WBC Thousand/uL 42 75* 39 26*  --  50 79* 59 64*   HEMOGLOBIN g/dL 11 9* 10 7*  --  11 6* 12 5   HEMATOCRIT % 37 8 33 7*  --  35 8* 39 7   PLATELETS Thousands/uL 146* 120* 120* 126* 150   NEUTROS ABS Thousands/µL 5 04  --   --   --   --      Results from last 7 days   Lab Units 10/11/19  0604   RETIC CT ABS  38,600   RETIC CT PCT % 1 00     Results from last 7 days   Lab Units 10/11/19  0604 10/10/19  0511 10/09/19  2037 10/07/19  1439   SODIUM mmol/L 140 143 141 139   POTASSIUM mmol/L 4 2 4 3 4 3 4 6   CHLORIDE mmol/L 109* 109* 108 106   CO2 mmol/L 27 29 26 30   ANION GAP mmol/L 4 5 7 3*   BUN mg/dL 17 13 13 13   CREATININE mg/dL 1 07 1 03 1 06 1 16   EGFR ml/min/1 73sq m 75 78 75 68   CALCIUM mg/dL 8 9 8 5 8 8 8 5   MAGNESIUM mg/dL  --  1 9  --   --    PHOSPHORUS mg/dL  --  3 5  --   --      Results from last 7 days   Lab Units 10/10/19  0511 10/09/19  2037 10/07/19  1439   AST U/L 10 12 21   ALT U/L 16 19 26   ALK PHOS U/L 78 88 102   TOTAL PROTEIN g/dL 6 1* 6 5 6 7   ALBUMIN g/dL 3 2* 3 3* 4 3   TOTAL BILIRUBIN mg/dL 0 70 0 70 1 67*         Results from last 7 days   Lab Units 10/11/19  0604 10/10/19  0511 10/09/19  2037 10/07/19  1439   GLUCOSE RANDOM mg/dL 86 87 119 94       Results from last 7 days   Lab Units 10/10/19  0511 10/09/19  2339   PROCALCITONIN ng/ml 0 22 0 22       Results from last 7 days   Lab Units 10/11/19  0604   FERRITIN ng/mL 468*     Vital Signs:    Date/Time  Temp  Pulse  Resp  BP  MAP (mmHg)  SpO2    10/11/19 07:13:19  97 4 °F (36 3 °C)Abnormal   57  18  108/62  77  99 %          Medications:     Medications:  cefazolin 2,000 mg Intravenous Q8H   enoxaparin 40 mg Subcutaneous Daily   finasteride 5 mg Oral Daily   pantoprazole 40 mg Oral Early Morning   sucralfate 1,000 mg Oral Q6H GRACY       sodium chloride 50 mL/hr Intravenous Continuous       acetaminophen 650 mg Oral Q6H PRN   aluminum-magnesium hydroxide-simethicone 5 mL Oral Q4H PRN       Discharge Plan to be determined     Network Utilization Review Department  Phone: [phone]; Fax [phone]  [email or web address]  ATTENTION: Please call with any questions or concerns to [phone]  and carefully listen to the prompts so that you are directed to the right person  Send all requests for admission clinical reviews, approved or denied determinations and any other requests to fax [phone]   All voicemails are confidential

## 2019-10-12 VITALS
DIASTOLIC BLOOD PRESSURE: 63 MMHG | HEIGHT: 71 IN | WEIGHT: 161 LBS | OXYGEN SATURATION: 99 % | TEMPERATURE: 97.8 F | RESPIRATION RATE: 18 BRPM | HEART RATE: 59 BPM | BODY MASS INDEX: 22.54 KG/M2 | SYSTOLIC BLOOD PRESSURE: 123 MMHG

## 2019-10-12 LAB
ANION GAP SERPL CALCULATED.3IONS-SCNC: 2 MMOL/L (ref 4–13)
BASOPHILS # BLD AUTO: 0.1 THOUSANDS/ΜL (ref 0–0.1)
BASOPHILS NFR BLD AUTO: 0 % (ref 0–1)
BUN SERPL-MCNC: 17 MG/DL (ref 5–25)
CALCIUM SERPL-MCNC: 8.9 MG/DL (ref 8.3–10.1)
CHLORIDE SERPL-SCNC: 109 MMOL/L (ref 100–108)
CO2 SERPL-SCNC: 31 MMOL/L (ref 21–32)
CREAT SERPL-MCNC: 0.88 MG/DL (ref 0.6–1.3)
EOSINOPHIL # BLD AUTO: 0.12 THOUSAND/ΜL (ref 0–0.61)
EOSINOPHIL NFR BLD AUTO: 0 % (ref 0–6)
ERYTHROCYTE [DISTWIDTH] IN BLOOD BY AUTOMATED COUNT: 14.1 % (ref 11.6–15.1)
GFR SERPL CREATININE-BSD FRML MDRD: 93 ML/MIN/1.73SQ M
GLUCOSE SERPL-MCNC: 86 MG/DL (ref 65–140)
HCT VFR BLD AUTO: 36.7 % (ref 36.5–49.3)
HGB BLD-MCNC: 11.3 G/DL (ref 12–17)
IMM GRANULOCYTES # BLD AUTO: 0.08 THOUSAND/UL (ref 0–0.2)
IMM GRANULOCYTES NFR BLD AUTO: 0 % (ref 0–2)
LYMPHOCYTES # BLD AUTO: 23.67 THOUSANDS/ΜL (ref 0.6–4.47)
LYMPHOCYTES NFR BLD AUTO: 64 % (ref 14–44)
MCH RBC QN AUTO: 30.2 PG (ref 26.8–34.3)
MCHC RBC AUTO-ENTMCNC: 30.8 G/DL (ref 31.4–37.4)
MCV RBC AUTO: 98 FL (ref 82–98)
MONOCYTES # BLD AUTO: 9.81 THOUSAND/ΜL (ref 0.17–1.22)
MONOCYTES NFR BLD AUTO: 26 % (ref 4–12)
NEUTROPHILS # BLD AUTO: 3.63 THOUSANDS/ΜL (ref 1.85–7.62)
NEUTS SEG NFR BLD AUTO: 10 % (ref 43–75)
NRBC BLD AUTO-RTO: 0 /100 WBCS
PLATELET # BLD AUTO: 152 THOUSANDS/UL (ref 149–390)
PMV BLD AUTO: 10.7 FL (ref 8.9–12.7)
POTASSIUM SERPL-SCNC: 4.5 MMOL/L (ref 3.5–5.3)
RBC # BLD AUTO: 3.74 MILLION/UL (ref 3.88–5.62)
SODIUM SERPL-SCNC: 142 MMOL/L (ref 136–145)
WBC # BLD AUTO: 37.41 THOUSAND/UL (ref 4.31–10.16)

## 2019-10-12 PROCEDURE — 85025 COMPLETE CBC W/AUTO DIFF WBC: CPT | Performed by: NURSE PRACTITIONER

## 2019-10-12 PROCEDURE — 80048 BASIC METABOLIC PNL TOTAL CA: CPT | Performed by: NURSE PRACTITIONER

## 2019-10-12 PROCEDURE — 99233 SBSQ HOSP IP/OBS HIGH 50: CPT | Performed by: INTERNAL MEDICINE

## 2019-10-12 PROCEDURE — 99239 HOSP IP/OBS DSCHRG MGMT >30: CPT | Performed by: NURSE PRACTITIONER

## 2019-10-12 RX ORDER — CEPHALEXIN 500 MG/1
500 CAPSULE ORAL EVERY 6 HOURS SCHEDULED
Status: DISCONTINUED | OUTPATIENT
Start: 2019-10-12 | End: 2019-10-12 | Stop reason: HOSPADM

## 2019-10-12 RX ORDER — CEPHALEXIN 500 MG/1
500 CAPSULE ORAL EVERY 6 HOURS SCHEDULED
Qty: 24 CAPSULE | Refills: 0 | Status: SHIPPED | OUTPATIENT
Start: 2019-10-12 | End: 2019-10-18

## 2019-10-12 RX ADMIN — ALUMINUM HYDROXIDE, MAGNESIUM HYDROXIDE, AND SIMETHICONE 5 ML: 200; 200; 20 SUSPENSION ORAL at 03:31

## 2019-10-12 RX ADMIN — FINASTERIDE 5 MG: 5 TABLET, FILM COATED ORAL at 08:26

## 2019-10-12 RX ADMIN — SUCRALFATE 1000 MG: 1 SUSPENSION ORAL at 06:35

## 2019-10-12 RX ADMIN — CEFAZOLIN SODIUM 2000 MG: 2 SOLUTION INTRAVENOUS at 06:35

## 2019-10-12 RX ADMIN — SUCRALFATE 1000 MG: 1 SUSPENSION ORAL at 11:33

## 2019-10-12 RX ADMIN — CEPHALEXIN 500 MG: 500 CAPSULE ORAL at 11:33

## 2019-10-12 RX ADMIN — PANTOPRAZOLE SODIUM 40 MG: 40 TABLET, DELAYED RELEASE ORAL at 06:35

## 2019-10-12 NOTE — PROGRESS NOTES
Progress Note - Infectious Disease   Luke Bustillos 62 y o  male MRN: [id number]  Unit/Bed#: [facility] 913-01 Encounter: [id number]      Impression/Plan:  1  Left axillary cellulitis with lymphadenitis  Suspect at this time that the patient may have introduced or developed an episode of cellulitis from recent scratch or possibly from new deodorant which progressed with lymphadenitis and may explain poor response to p o  Antibiotics  Improved on exam   CT imaging with lymphadenitis and signs of cellulitis and no abscess  Patient denies having any overt risk factors for HIV or hepatitis B and C  He reports last being tested in 2007  He is in favor of avoiding additional blood work that can be done as an outpatient  Switched to Keflex 500 mg q 6 hours  Plan for total of 7 days of therapy through 10/18  Continue to trend fever curve/vitals while admitted  Oncology evaluation appreciated  Serial exams while admitted  Maintain heat at the site  Additional supportive care as per primary  Would recommend follow up with Oncology in 2 weeks particularly if lymphadenopathy persists  Recommend HIV, hepatitis-B and C testing for completeness with next outpatient visit  Follow-up with primary care physician otherwise  Patient cleared from an ID standpoint for discharge      2  Immunosuppression due to CLL and chronic leukocytosis  Patient remains chronically immunosuppressed with elevation in his white blood cell count at baseline due to CLL  Currently not on any chemotherapy  Oncology evaluation appreciated      Above case discussed in detail with the patient, primary service, and Oncology briefly  ID consult service will continue to follow this patient while he is admitted  Please contact ID attending on call if any additional questions or concerns over the weekend      Antibiotics:  Ancef 4    24 hour events:  No acute events noted overnight on chart review  Patient is currently afebrile  White blood cell count 40  Blood cultures without growth for 24 hours  CT without findings of abscess, axillary lymphadenopathy noted  Patient's other vitals are stable    Subjective:  Patient currently denies having any nausea, vomiting, chest pain or shortness of breath  Currently tolerating antibiotic without acute issue  Denies any progressive her new pain in his axilla  Denies having any subjective fevers or sweats  Objective:  Vitals:  Temp:  [97 4 °F (36 3 °C)-98 1 °F (36 7 °C)] 97 8 °F (36 6 °C)  HR:  [53-67] 59  Resp:  [18] 18  BP: ()/(49-63) 123/63  SpO2:  [98 %-100 %] 99 %  Temp (24hrs), Av 8 °F (36 6 °C), Min:97 4 °F (36 3 °C), Max:98 1 °F (36 7 °C)  Current: Temperature: 97 8 °F (36 6 °C)    Physical Exam:   General Appearance:  Alert, interactive, nontoxic, no acute distress  Throat: Oropharynx moist without lesions  Lungs:   Clear to auscultation bilaterally; no wheezes, rhonchi or rales; respirations unlabored on room air   Heart:  RRR; no murmur, rub or gallop   Abdomen:   Soft, non-tender, non-distended, positive bowel sounds  Extremities: No clubbing, cyanosis or edema   Skin: No new rashes or lesions  No new draining wounds noted  Patient's skin in his axilla is without any overt erythema  Previously noted lymph nodes remain and the more distal lesion appears slightly larger  The more medial lesion is smaller         Labs, Imaging, & Other studies:   All pertinent labs and imaging studies were personally reviewed  Results from last 7 days   Lab Units 10/12/19  0639 10/11/19  0604 10/10/19  1017   WBC Thousand/uL 37 41* 42 75* 39 26*   HEMOGLOBIN g/dL 11 3* 11 9* 10 7*   PLATELETS Thousands/uL 152 146* 120*     Results from last 7 days   Lab Units 10/12/19  0639  10/10/19  0511 10/09/19  2037 10/07/19  1439   POTASSIUM mmol/L 4 5   < > 4 3 4 3 4 6   CHLORIDE mmol/L 109*   < > 109* 108 106   CO2 mmol/L 31   < > 29 26 30   BUN mg/dL 17   < > 13 13 13   CREATININE mg/dL 0 88   < > 1 03 1 06 1 16 EGFR ml/min/1 73sq m 93   < > 78 75 68   CALCIUM mg/dL 8 9   < > 8 5 8 8 8 5   AST U/L  --   --  10 12 21   ALT U/L  --   --  16 19 26   ALK PHOS U/L  --   --  78 88 102    < > = values in this interval not displayed  Results from last 7 days   Lab Units 10/09/19  4628 10/09/19  1998   BLOOD CULTURE  No Growth at 24 hrs  No Growth at 24 hrs

## 2019-10-12 NOTE — PLAN OF CARE
Problem: PAIN - ADULT  Goal: Verbalizes/displays adequate comfort level or baseline comfort level  Description  Interventions:  - Encourage patient to monitor pain and request assistance  - Assess pain using appropriate pain scale  - Administer analgesics based on type and severity of pain and evaluate response  - Implement non-pharmacological measures as appropriate and evaluate response  - Consider cultural and social influences on pain and pain management  - Notify physician/advanced practitioner if interventions unsuccessful or patient reports new pain  Outcome: Progressing     Problem: INFECTION - ADULT  Goal: Absence or prevention of progression during hospitalization  Description  INTERVENTIONS:  - Assess and monitor for signs and symptoms of infection  - Monitor lab/diagnostic results  - Monitor all insertion sites, i e  indwelling lines, tubes, and drains  - Monitor endotracheal if appropriate and nasal secretions for changes in amount and color  - [place] appropriate cooling/warming therapies per order  - Administer medications as ordered  - Instruct and encourage patient and family to use good hand hygiene technique  - Identify and instruct in appropriate isolation precautions for identified infection/condition  Outcome: Progressing  Goal: Absence of fever/infection during neutropenic period  Description  INTERVENTIONS:  - Monitor WBC    Outcome: Progressing     Problem: SAFETY ADULT  Goal: Patient will remain free of falls  Description  INTERVENTIONS:  - Assess patient frequently for physical needs  -  Identify cognitive and physical deficits and behaviors that affect risk of falls    -  [place] fall precautions as indicated by assessment   - Educate patient/family on patient safety including physical limitations  - Instruct patient to call for assistance with activity based on assessment  - Modify environment to reduce risk of injury  - Consider OT/PT consult to assist with strengthening/mobility  Outcome: Progressing  Goal: Maintain or return to baseline ADL function  Description  INTERVENTIONS:  -  Assess patient's ability to carry out ADLs; assess patient's baseline for ADL function and identify physical deficits which impact ability to perform ADLs (bathing, care of mouth/teeth, toileting, grooming, dressing, etc )  - Assess/evaluate cause of self-care deficits   - Assess range of motion  - Assess patient's mobility; develop plan if impaired  - Assess patient's need for assistive devices and provide as appropriate  - Encourage maximum independence but intervene and supervise when necessary  - Involve family in performance of ADLs  - Assess for home care needs following discharge   - Consider OT consult to assist with ADL evaluation and planning for discharge  - Provide patient education as appropriate  Outcome: Progressing  Goal: Maintain or return mobility status to optimal level  Description  INTERVENTIONS:  - Assess patient's baseline mobility status (ambulation, transfers, stairs, etc )    - Identify cognitive and physical deficits and behaviors that affect mobility  - Identify mobility aids required to assist with transfers and/or ambulation (gait belt, sit-to-stand, lift, walker, cane, etc )  - [place] fall precautions as indicated by assessment  - Record patient progress and toleration of activity level on Mobility SBAR; progress patient to next Phase/Stage  - Instruct patient to call for assistance with activity based on assessment  - Consider rehabilitation consult to assist with strengthening/weightbearing, etc   Outcome: Progressing     Problem: DISCHARGE PLANNING  Goal: Discharge to home or other facility with appropriate resources  Description  INTERVENTIONS:  - Identify barriers to discharge w/patient and caregiver  - Arrange for needed discharge resources and transportation as appropriate  - Identify discharge learning needs (meds, wound care, etc )  - Arrange for interpretive services to assist at discharge as needed  - Refer to Case Management Department for coordinating discharge planning if the patient needs post-hospital services based on physician/advanced practitioner order or complex needs related to functional status, cognitive ability, or social support system  Outcome: Progressing     Problem: Knowledge Deficit  Goal: Patient/family/caregiver demonstrates understanding of disease process, treatment plan, medications, and discharge instructions  Description  Complete learning assessment and assess knowledge base  Interventions:  - Provide teaching at level of understanding  - Provide teaching via preferred learning methods  Outcome: Progressing     Problem: Potential for Falls  Goal: Patient will remain free of falls  Description  INTERVENTIONS:  - Assess patient frequently for physical needs  -  Identify cognitive and physical deficits and behaviors that affect risk of falls    -  [place] fall precautions as indicated by assessment   - Educate patient/family on patient safety including physical limitations  - Instruct patient to call for assistance with activity based on assessment  - Modify environment to reduce risk of injury  - Consider OT/PT consult to assist with strengthening/mobility  Outcome: Progressing

## 2019-10-12 NOTE — DISCHARGE SUMMARY
Discharge Summary - [facility] Internal Medicine    Patient Information: Joseph Acuña 62 y o  male MRN: [id number]  Unit/Bed#: [facility] 913-01 Encounter: [id number]    Discharging Physician / Practitioner: ELISABET Pack  PCP: Estrella Barrera MD  Admission Date: 10/9/2019  Discharge Date: 10/12/19    Reason for Admission:  Left axillary cellulitis with lymphadenitis    Discharge Diagnoses:     Principal Problem:    Axillary lymphadenitis  Active Problems:    CLL (chronic lymphocytic leukemia) ([facility] [address] )    Chronic GERD  Resolved Problems:    * No resolved hospital problems  *      Consultations During Hospital Stay:  · Infectious Disease  · Hematology Oncology    Procedures Performed:     · None    Significant Findings / Test Results:     · CT left shoulder with contrast: There is inflammatory stranding within the infiltration in the left axilla and proximal medial arm  There are associated mild borderline enlarged lymph nodes suggesting cellulitic changes  There is no shoulder joint effusion, lytic destructive changes within the glenohumeral joint or in the [facility] joint  No intramuscular abscess seen in the rotator cuff area  Incidental Findings:   · None     Test Results Pending at Discharge (will require follow up): · None     Outpatient Tests Requested:  · Outpatient follow-up with PCP and outpatient oncologist Dr Reza Dash  · Would recommend outpatient hepatitis-B, C and HIV testing as an outpatient    Complications:  None    Hospital Course:     Joseph Acuña is a 62 y o  male patient with a past medical history of GERD, CLL with chronic leukocytosis who originally presented to the hospital on 10/9/2019 due to worsening left axillary pain and swelling despite being treated for lymphadenitis/cellulitis as an outpatient with p o  Keflex  Patient was admitted for intravenous antibiotics with intravenous Ancef  Id consultation was obtained as well as CT scan which did not show any abscess    This has improved but not yet resolved  He was also evaluated by Oncology given new mild anemia and mild thrombocytopenia which are likely secondary to infection  Anemia has remained stable, thrombocytopenia resolved and platelet count is now normal     Discussed with Infectious Disease on day of discharge, stable for discharge from their standpoint  Will continue p o  Keflex through 10/18  Recommend warm compresses  He was instructed to  follow up with his PCP in outpatient oncologist   Discharge plan discussed with patient and his partner at bedside  Id recommending hepatitis-B, C and HIV testing as outpatient  Condition at Discharge: stable     Discharge Day Visit / Exam:     Subjective:  Patient offers no acute complaints  Pain is improving  Redness and swelling are also improving but not yet resolved  Vitals: Blood Pressure: 123/63 (10/12/19 0711)  Pulse: 59 (10/12/19 0711)  Temperature: 97 8 °F (36 6 °C) (10/12/19 0711)  Temp Source: Oral (10/09/19 1928)  Respirations: 18 (10/12/19 0711)  Height: 5' 10 5" (179 1 cm) (10/10/19 1100)  Weight - Scale: 73 kg (161 lb) (10/10/19 1100)  SpO2: 99 % (10/12/19 0711)     Exam:   Physical Exam   Constitutional: He is oriented to person, place, and time  No distress  HENT:   Head: Normocephalic  Eyes: Conjunctivae are normal    Neck: Normal range of motion  Cardiovascular: Normal rate  Pulmonary/Chest: Breath sounds normal    Abdominal: Bowel sounds are normal    Musculoskeletal: Normal range of motion  Neurological: He is alert and oriented to person, place, and time  Skin: Skin is warm and dry  Left axilla with improved erythema  Lymph node remains swollen but overall decreased in size  Nursing note and vitals reviewed  Discussion with Family:  Patient and partner at bedside    Discharge instructions/Information to patient and family:   See after visit summary for information provided to patient and family        Provisions for Follow-Up Care:  See after visit summary for information related to follow-up care and any pertinent home health orders  Disposition:     Home    For Discharges to [address] [facility]:   · Not Applicable to this Patient - Not Applicable to this Patient    Planned Readmission: no     Discharge Statement:  I spent 45 minutes discharging the patient  This time was spent on the day of discharge  I had direct contact with the patient on the day of discharge  Greater than 50% of the total time was spent examining patient, answering all patient questions, arranging and discussing plan of care with patient as well as directly providing post-discharge instructions  Additional time then spent on discharge activities  Discharge Medications:  See after visit summary for reconciled discharge medications provided to patient and family        ** Please Note: This note has been constructed using a voice recognition system **

## 2019-10-14 ENCOUNTER — TRANSITIONAL CARE MANAGEMENT (OUTPATIENT)
Dept: FAMILY MEDICINE CLINIC | Facility: CLINIC | Age: 59
End: 2019-10-14

## 2019-10-15 LAB
BACTERIA BLD CULT: NORMAL
BACTERIA BLD CULT: NORMAL

## 2019-10-17 PROBLEM — J06.9 ACUTE UPPER RESPIRATORY INFECTION: Status: RESOLVED | Noted: 2018-09-28 | Resolved: 2019-10-17

## 2019-10-17 PROBLEM — L03.112 CELLULITIS OF LEFT AXILLA: Status: ACTIVE | Noted: 2019-10-17

## 2019-10-18 ENCOUNTER — OFFICE VISIT (OUTPATIENT)
Dept: FAMILY MEDICINE CLINIC | Facility: CLINIC | Age: 59
End: 2019-10-18
Payer: COMMERCIAL

## 2019-10-18 ENCOUNTER — TELEPHONE (OUTPATIENT)
Dept: GASTROENTEROLOGY | Facility: CLINIC | Age: 59
End: 2019-10-18

## 2019-10-18 ENCOUNTER — TELEPHONE (OUTPATIENT)
Dept: HEMATOLOGY ONCOLOGY | Facility: CLINIC | Age: 59
End: 2019-10-18

## 2019-10-18 VITALS
HEIGHT: 71 IN | BODY MASS INDEX: 22.68 KG/M2 | DIASTOLIC BLOOD PRESSURE: 68 MMHG | OXYGEN SATURATION: 98 % | SYSTOLIC BLOOD PRESSURE: 120 MMHG | RESPIRATION RATE: 16 BRPM | TEMPERATURE: 97 F | WEIGHT: 162 LBS | HEART RATE: 78 BPM

## 2019-10-18 DIAGNOSIS — Z11.59 NEED FOR HEPATITIS B SCREENING TEST: ICD-10-CM

## 2019-10-18 DIAGNOSIS — L03.112 CELLULITIS OF LEFT AXILLA: Primary | ICD-10-CM

## 2019-10-18 DIAGNOSIS — C91.10 CLL (CHRONIC LYMPHOCYTIC LEUKEMIA) (HCC): ICD-10-CM

## 2019-10-18 DIAGNOSIS — K21.9 CHRONIC GERD: ICD-10-CM

## 2019-10-18 DIAGNOSIS — Z11.4 SCREENING FOR HIV (HUMAN IMMUNODEFICIENCY VIRUS): ICD-10-CM

## 2019-10-18 DIAGNOSIS — Z11.59 NEED FOR HEPATITIS C SCREENING TEST: ICD-10-CM

## 2019-10-18 PROCEDURE — 99496 TRANSJ CARE MGMT HIGH F2F 7D: CPT | Performed by: FAMILY MEDICINE

## 2019-10-18 PROCEDURE — 1111F DSCHRG MED/CURRENT MED MERGE: CPT | Performed by: FAMILY MEDICINE

## 2019-10-18 RX ORDER — OCTISALATE, AVOBENZONE, HOMOSALATE, AND OCTOCRYLENE 29.4; 29.4; 49; 25.48 MG/ML; MG/ML; MG/ML; MG/ML
LOTION TOPICAL
Qty: 30 CAPSULE | Refills: 3
Start: 2019-10-18

## 2019-10-18 RX ORDER — OMEPRAZOLE 20 MG/1
20 CAPSULE, DELAYED RELEASE ORAL DAILY
Qty: 90 CAPSULE | Refills: 1 | Status: SHIPPED | OUTPATIENT
Start: 2019-10-18 | End: 2020-02-28 | Stop reason: SDUPTHER

## 2019-10-18 RX ORDER — SUCRALFATE 1 G/1
TABLET ORAL
Qty: 60 TABLET | Refills: 1 | Status: SHIPPED | OUTPATIENT
Start: 2019-10-18 | End: 2020-02-28

## 2019-10-18 NOTE — TELEPHONE ENCOUNTER
Patient called stating that during he was in the ED 10/9--10/12 for a  Infection on the lymh nodes  He stated that he seen his pcp Dr Eunice Tang today and noted that their seems to be no signs of infection but would like to confirm if he should keep his   12/16 appt with Dr Geno Marie or move it up  Best call back [phone]

## 2019-10-18 NOTE — TELEPHONE ENCOUNTER
Patients GI provider:  Dr Deyanira Esposito     Number to return call: [phone]    Reason for call: Pt calling to schedule a f/u with dr Nancy Vora for stomach issues   Please assist in scheduling     Scheduled procedure/appointment date if applicable: Apt/procedure - n/a

## 2019-10-18 NOTE — TELEPHONE ENCOUNTER
Please advise patient he is fine to continue with appointment in December  May schedule earlier if he wishes but is not necessary

## 2019-10-18 NOTE — PROGRESS NOTES
Chief Complaint   Patient presents with    Transition of Care Management     10/9-10/12/19 Axillary lymphadenitis     Health Maintenance   Topic Date Due    Hepatitis C Screening  1960    Depression Screening PHQ  1960    Pneumococcal Vaccine: Pediatrics (0 to 5 Years) and At-Risk Patients (6 to 59 Years) (1 of 3 - PCV13) 01/14/1964    DTaP,Tdap,and Td Vaccines (2 - Td) 10/01/2018    BMI: Adult  10/18/2020    Pneumococcal Vaccine: 65+ Years (1 of 2 - PCV13) 01/14/2023    CRC Screening: Colonoscopy  06/12/2024    INFLUENZA VACCINE  Completed    HEPATITIS B VACCINES  Aged Out     Assessment/Plan:     Patient presents for TCM visit  Patient was hospitalized at [address] [place] [address] 10/9/19 -10/12/19 for left axillary cellulitis with lymphadenitis  Cellulitis of left axilla  Cellulitis L axilla / Lymphadenitis- resolved  Patient will complete Abx with Keflex today  CLL (chronic lymphocytic leukemia) ([facility] [address] )  Recommended to schedule follow-up office visit with hematology- oncology Dr Mara Lopes  Chronic GERD  Patient c/o persistent heartburn  Will start Carafate 1 tablet 3 times daily before meals and at bedtime PRN  Discussed changing Omeprazole to a different PPI  Patient would like to continue Omeprazole until seen by gastroenterology  Discussed anti-reflux measures with patient  Recommended to avoid caffeine, tomato sauce, tomato juice, fried foods, late meals  Start Probiotic Align one caps  daily  Schedule appointment with gastroenterologist Dr Gricelda Sweeney to discuss further medical medical management, consider scheduling EGD to r/o Mortensen's esophagus, PUD, reflux esophagitis, H Pylori gastritis  Patient is monogamous relationship with his boyfriend  Will check Hep B, Hep C serology, HIV Ab  Schedule follow-up office visit 6 months       Diagnoses and all orders for this visit:    Cellulitis of left axilla    CLL (chronic lymphocytic leukemia) [facility])    Chronic GERD  -     Ambulatory referral to Gastroenterology; Future  -     sucralfate (CARAFATE) 1 g tablet; Take 1 tablet 3 times daily before meals and bedtime PRN  -     omeprazole (PriLOSEC) 20 mg delayed release capsule; Take 1 capsule (20 mg total) by mouth daily  -     Probiotic Product (ALIGN) 4 MG CAPS; Take 1 caps  daily  Need for hepatitis C screening test  -     Hepatitis C antibody; Future    Screening for HIV (human immunodeficiency virus)  -     HIV 1/2 AG-AB combo; Future    Need for hepatitis B screening test  -     Hepatitis B surface antigen; Future         Subjective:     Patient ID: Windy Connor is a 62 y o  male  HPI     Patient presents for TCM visit  Patient was hospitalized at [facility] 10/9/19 -10/12/19 for left axillary cellulitis with lymphadenitis  He presented to the hospital due to worsening left axillary pain and swelling despite being treated for lymphadenitis/ cellulitis as outpatient with Keflex  He was admitted for intravenous antibiotics, was started Ancef IV  Patient was seen in consultation by Infectious Disease  CT scan L shoulder did not show any abscess  Patient's condition gradually improved  Patient has CLL  He had mild anemia and mild thrombocytopenia which were likely secondary to infection  Hb remained stable  Platelet count was normal upon discharge  Patient was discharged home on Cephalexin  He will complete Abx today  Patient denies skin redness, pain in L axilla  No swelling  No fever or chills  Reviewed blood work October 12, 2019  Creatinine 0 88, glucose 86, potassium 4 5  WBC 37 41 thousands  Hb 11 3  Platelets 622 thousands  Patient is monogamous relationship with his  partner  Attending physician recommended to check Hep B and Hep C serology, HIV testing as outpatient  Patient has chronic  GERD  C/o persistent heartburn despite taking Omeprazole 20 mg daily      He also tried Ranitidine 75 mg to take at bedtime with no significant relief in symptoms  Patient was previously seen by gastroenterologist Dr Elisa Simons, had EGD done 2 years ago  Gastric emptying study done in 8/17 was normal     Ultrasound of right upper quadrant from 7/17 showed no gallstones or sludge  Patient denies nausea, vomiting  No blood in stool  Denies tobacco use  Review of Systems   Constitutional: Negative for activity change, appetite change, chills, fatigue and fever  HENT: Negative for congestion, ear pain, nosebleeds, sore throat and trouble swallowing  Eyes: Negative for pain, discharge, redness, itching and visual disturbance  Respiratory: Negative for cough, chest tightness, shortness of breath and wheezing  Cardiovascular: Negative for chest pain, palpitations and leg swelling  Gastrointestinal: Negative for abdominal pain, blood in stool, constipation, diarrhea and vomiting  Heartburn   Genitourinary: Negative for difficulty urinating, dysuria, frequency and hematuria  Musculoskeletal: Negative for arthralgias, back pain, joint swelling, myalgias and neck pain  Skin: Negative for rash and wound  Neurological: Negative for dizziness and headaches  Hematological: Negative  Psychiatric/Behavioral:        Anxiety         Objective:     Physical Exam   Constitutional: He appears well-developed and well-nourished  HENT:   Head: Normocephalic and atraumatic  Right Ear: External ear normal    Left Ear: External ear normal    Mouth/Throat: Oropharynx is clear and moist    Eyes: Pupils are equal, round, and reactive to light  Conjunctivae are normal    Cardiovascular: Normal rate, regular rhythm and normal heart sounds  No murmur heard  No BL LE edema   Pulmonary/Chest: Effort normal and breath sounds normal    Abdominal: Soft  Bowel sounds are normal  There is no tenderness  Musculoskeletal: Normal range of motion   He exhibits no edema, tenderness or deformity  Skin: Skin is warm and dry  No rash noted  L axilla : no skin redness  No swelling  No L axillary lymphadenopathy  Psychiatric: He has a normal mood and affect  Nursing note and vitals reviewed  Vitals:    10/18/19 0820   BP: 120/68   BP Location: Left arm   Patient Position: Sitting   Cuff Size: Adult   Pulse: 78   Resp: 16   Temp: (!) 97 °F (36 1 °C)   TempSrc: Tympanic   SpO2: 98%   Weight: 73 5 kg (162 lb)   Height: 5' 10 5" (1 791 m)       Transitional Care Management Review:  Windy Connor is a 62 y o  male here for TCM follow up  During the TCM phone call patient stated:    TCM Call (since 9/17/2019)     Date and time call was made  10/14/2019  [facility] care reviewed  Records reviewed    Patient was hospitialized at  [facility]    Date of Admission  10/09/19    Date of discharge  10/12/19    Diagnosis  Axillary lymphadenitis    Disposition  Home    Were the patients medications reviewed and updated  Yes    Current Symptoms  None      TCM Call (since 9/17/2019)     Post hospital issues  None    Should patient be enrolled in anticoag monitoring? No    Scheduled for follow up?   Yes    Did you obtain your prescribed medications  Yes    Do you need help managing your prescriptions or medications  No    Is transportation to your appointment needed  Yes    Specify why  No Driving    I have advised the patient to call PCP with any new or worsening symptoms  STEVE Sherwood    Are you recieving any outpatient services  No    Are you recieving home care services  No    Are you using any community resources  No    Current waiver services  No    Have you fallen in the last 12 months  No    Interperter language line needed  No    Counseling  Patient          Henry Clinton MD

## 2019-10-18 NOTE — ASSESSMENT & PLAN NOTE
Cellulitis L axilla / Lymphadenitis- resolved  Patient will complete Abx with Keflex today 
Patient c/o persistent heartburn  Will start Carafate 1 tablet 3 times daily before meals and at bedtime PRN  Discussed changing Omeprazole to a different PPI  Patient would like to continue Omeprazole until seen by gastroenterology  Discussed anti-reflux measures with patient  Recommended to avoid caffeine, tomato sauce, tomato juice, fried foods, late meals  Start Probiotic Align one caps  daily  Schedule appointment with gastroenterologist Dr Rajiv Monzon to discuss further medical medical management, consider scheduling EGD to r/o Mortensen's esophagus, PUD, reflux esophagitis, H Pylori gastritis 
Recommended to schedule follow-up office visit with hematology- oncology Dr Daria Prasad 
100

## 2019-12-09 ENCOUNTER — LAB (OUTPATIENT)
Dept: LAB | Facility: HOSPITAL | Age: 59
End: 2019-12-09
Attending: INTERNAL MEDICINE
Payer: COMMERCIAL

## 2019-12-09 ENCOUNTER — TELEPHONE (OUTPATIENT)
Dept: HEMATOLOGY ONCOLOGY | Facility: CLINIC | Age: 59
End: 2019-12-09

## 2019-12-09 DIAGNOSIS — Z11.59 NEED FOR HEPATITIS B SCREENING TEST: ICD-10-CM

## 2019-12-09 DIAGNOSIS — Z11.59 NEED FOR HEPATITIS C SCREENING TEST: ICD-10-CM

## 2019-12-09 DIAGNOSIS — C91.10 CLL (CHRONIC LYMPHOCYTIC LEUKEMIA) (HCC): ICD-10-CM

## 2019-12-09 DIAGNOSIS — Z11.4 SCREENING FOR HIV (HUMAN IMMUNODEFICIENCY VIRUS): ICD-10-CM

## 2019-12-09 LAB
ALBUMIN SERPL BCP-MCNC: 4.3 G/DL (ref 3.5–5)
ALP SERPL-CCNC: 112 U/L (ref 46–116)
ALT SERPL W P-5'-P-CCNC: 34 U/L (ref 12–78)
ANION GAP SERPL CALCULATED.3IONS-SCNC: 2 MMOL/L (ref 4–13)
AST SERPL W P-5'-P-CCNC: 24 U/L (ref 5–45)
BASOPHILS # BLD MANUAL: 0 THOUSAND/UL (ref 0–0.1)
BASOPHILS NFR MAR MANUAL: 0 % (ref 0–1)
BILIRUB SERPL-MCNC: 1.26 MG/DL (ref 0.2–1)
BUN SERPL-MCNC: 19 MG/DL (ref 5–25)
CALCIUM SERPL-MCNC: 9.2 MG/DL (ref 8.3–10.1)
CHLORIDE SERPL-SCNC: 107 MMOL/L (ref 100–108)
CO2 SERPL-SCNC: 29 MMOL/L (ref 21–32)
CREAT SERPL-MCNC: 1.08 MG/DL (ref 0.6–1.3)
EOSINOPHIL # BLD MANUAL: 0 THOUSAND/UL (ref 0–0.4)
EOSINOPHIL NFR BLD MANUAL: 0 % (ref 0–6)
ERYTHROCYTE [DISTWIDTH] IN BLOOD BY AUTOMATED COUNT: 14.5 % (ref 11.6–15.1)
GFR SERPL CREATININE-BSD FRML MDRD: 74 ML/MIN/1.73SQ M
GLUCOSE P FAST SERPL-MCNC: 87 MG/DL (ref 65–99)
HBV SURFACE AG SER QL: NORMAL
HCT VFR BLD AUTO: 43.7 % (ref 36.5–49.3)
HCV AB SER QL: NORMAL
HGB BLD-MCNC: 13.6 G/DL (ref 12–17)
LDH SERPL-CCNC: 194 U/L (ref 81–234)
LYMPHOCYTES # BLD AUTO: 55.95 THOUSAND/UL (ref 0.6–4.47)
LYMPHOCYTES # BLD AUTO: 86 % (ref 14–44)
MCH RBC QN AUTO: 30.4 PG (ref 26.8–34.3)
MCHC RBC AUTO-ENTMCNC: 31.1 G/DL (ref 31.4–37.4)
MCV RBC AUTO: 98 FL (ref 82–98)
MONOCYTES # BLD AUTO: 1.3 THOUSAND/UL (ref 0–1.22)
MONOCYTES NFR BLD: 2 % (ref 4–12)
NEUTROPHILS # BLD MANUAL: 7.81 THOUSAND/UL (ref 1.85–7.62)
NEUTS SEG NFR BLD AUTO: 12 % (ref 43–75)
NRBC BLD AUTO-RTO: 0 /100 WBCS
PLATELET # BLD AUTO: 169 THOUSANDS/UL (ref 149–390)
PLATELET BLD QL SMEAR: ADEQUATE
PMV BLD AUTO: 11 FL (ref 8.9–12.7)
POTASSIUM SERPL-SCNC: 4.6 MMOL/L (ref 3.5–5.3)
PROT SERPL-MCNC: 6.7 G/DL (ref 6.4–8.2)
RBC # BLD AUTO: 4.47 MILLION/UL (ref 3.88–5.62)
RBC MORPH BLD: NORMAL
SMUDGE CELLS BLD QL SMEAR: PRESENT
SODIUM SERPL-SCNC: 138 MMOL/L (ref 136–145)
TOTAL CELLS COUNTED SPEC: 100
WBC # BLD AUTO: 65.06 THOUSAND/UL (ref 4.31–10.16)

## 2019-12-09 PROCEDURE — 86803 HEPATITIS C AB TEST: CPT

## 2019-12-09 PROCEDURE — 85007 BL SMEAR W/DIFF WBC COUNT: CPT

## 2019-12-09 PROCEDURE — 85027 COMPLETE CBC AUTOMATED: CPT

## 2019-12-09 PROCEDURE — 83615 LACTATE (LD) (LDH) ENZYME: CPT

## 2019-12-09 PROCEDURE — 87389 HIV-1 AG W/HIV-1&-2 AB AG IA: CPT

## 2019-12-09 PROCEDURE — 87340 HEPATITIS B SURFACE AG IA: CPT

## 2019-12-09 PROCEDURE — 80053 COMPREHEN METABOLIC PANEL: CPT

## 2019-12-09 PROCEDURE — 36415 COLL VENOUS BLD VENIPUNCTURE: CPT

## 2019-12-10 LAB — HIV 1+2 AB+HIV1 P24 AG SERPL QL IA: NORMAL

## 2019-12-16 ENCOUNTER — OFFICE VISIT (OUTPATIENT)
Dept: HEMATOLOGY ONCOLOGY | Facility: HOSPITAL | Age: 59
End: 2019-12-16
Payer: COMMERCIAL

## 2019-12-16 VITALS
RESPIRATION RATE: 16 BRPM | TEMPERATURE: 98.2 F | DIASTOLIC BLOOD PRESSURE: 62 MMHG | WEIGHT: 163 LBS | HEIGHT: 71 IN | BODY MASS INDEX: 22.82 KG/M2 | SYSTOLIC BLOOD PRESSURE: 98 MMHG | HEART RATE: 70 BPM | OXYGEN SATURATION: 98 %

## 2019-12-16 DIAGNOSIS — C91.10 CLL (CHRONIC LYMPHOCYTIC LEUKEMIA) (HCC): Primary | ICD-10-CM

## 2019-12-16 PROCEDURE — 99214 OFFICE O/P EST MOD 30 MIN: CPT | Performed by: INTERNAL MEDICINE

## 2019-12-16 NOTE — PROGRESS NOTES
Hematology/Oncology Outpatient Follow- up Note  Windy Connor 62 y o  male MRN: @ Encounter: [id number]        Date:  12/16/2019    Presenting Complaint/Diagnosis :     Elevated white count with lymphocytosis  Workup revealed CLL       Previous Hematologic/ Oncologic History:    Workup    Current Hematologic/ Oncologic Treatment:      Observation    Interval History:      The patient returns for follow-up visit  His most recent white count has jumped up to 65 06  Was actually lower in October In the 41 range but again previously has been as high as 59 again a few months ago  Was in the hospital for an infected axillary lymph node for which he was on antibiotics  He is now doing better  As far as symptoms are concerned He states he is much better than he was in the hospital  Still is slightly tired but improving  Denies any nausea denies any vomiting denies any diarrhea  Does have heartburn  The rest of his 14 point review of systems today was negative  Test Results:    Imaging: No results found  Labs:   Lab Results   Component Value Date    WBC 65 06 (HH) 12/09/2019    HGB 13 6 12/09/2019    HCT 43 7 12/09/2019    MCV 98 12/09/2019     12/09/2019     Lab Results   Component Value Date     03/17/2017    K 4 6 12/09/2019     12/09/2019    CO2 29 12/09/2019    BUN 19 12/09/2019    CREATININE 1 08 12/09/2019    GLUF 87 12/09/2019    CALCIUM 9 2 12/09/2019    AST 24 12/09/2019    ALT 34 12/09/2019    ALKPHOS 112 12/09/2019    PROT 6 2 03/17/2017    BILITOT 1 3 (H) 03/17/2017    EGFR 74 12/09/2019       Lab Results   Component Value Date    IRON 62 (L) 10/11/2019    TIBC 240 (L) 10/11/2019    FERRITIN 468 (H) 10/11/2019       Lab Results   Component Value Date    UPCGSGYF10 305 10/11/2019         ROS: As stated in the history of present illness otherwise his 14 point review of systems today was negative        Active Problems:   Patient Active Problem List   Diagnosis    Chronic GERD    CLL (chronic lymphocytic leukemia) (HCC)    Lymphocytosis    Axillary lymphadenitis    Cellulitis of left axilla       Past Medical History:   Past Medical History:   Diagnosis Date    Abdominal pain     Epigastric pain     Gallbladder polyp     Leukocytosis     Lymphocytosis     Panic attacks     years ago    Rosacea        Surgical History:   Past Surgical History:   Procedure Laterality Date    HEMORRHOID SURGERY      CO COLONOSCOPY FLX DX W/COLLJ SPEC WHEN PFRMD N/A 4/11/2016    Procedure: COLONOSCOPY;  Surgeon: Bethany Osorio MD;  Location:  GI LAB; Service: Colorectal    CO ESOPHAGOGASTRODUODENOSCOPY TRANSORAL DIAGNOSTIC N/A 3/31/2017    Procedure: ESOPHAGOGASTRODUODENOSCOPY (EGD); Surgeon: Bradley Jeffries MD;  Location: [facility] GI LAB; Service: Gastroenterology       Family History:  No family history on file  Cancer-related family history is not on file      Social History:   Social History     Socioeconomic History    Marital status: Single     Spouse name: Not on file    Number of children: Not on file    Years of education: Not on file    Highest education level: Not on file   Occupational History    Not on file   Social Needs    Financial resource strain: Not on file    Food insecurity:     Worry: Not on file     Inability: Not on file    Transportation needs:     Medical: Not on file     Non-medical: Not on file   Tobacco Use    Smoking status: Never Smoker    Smokeless tobacco: Never Used   Substance and Sexual Activity    Alcohol use: Yes     Comment: socially    Drug use: No    Sexual activity: Not on file   Lifestyle    Physical activity:     Days per week: Not on file     Minutes per session: Not on file    Stress: Not on file   Relationships    Social connections:     Talks on phone: Not on file     Gets together: Not on file     Attends [employer] service: Not on file     Active member of club or organization: Not on file     Attends meetings of clubs or organizations: Not on file     Relationship status: Not on file    Intimate partner violence:     Fear of current or ex partner: Not on file     Emotionally abused: Not on file     Physically abused: Not on file     Forced sexual activity: Not on file   Other Topics Concern    Not on file   Social History Narrative    Not on file       Current Medications:   Current Outpatient Medications   Medication Sig Dispense Refill    Alum Hydroxide-Mag Carbonate ([address]isha [address]) 771-555 MG/10ML SUSP Take by mouth      finasteride (PROPECIA) 1 MG tablet Take 1 mg by mouth daily      omeprazole (PriLOSEC) 20 mg delayed release capsule Take 1 capsule (20 mg total) by mouth daily 90 capsule 1    Probiotic Product (ALIGN) 4 MG CAPS Take 1 caps  daily  30 capsule 3    ranitidine (ZANTAC) 75 MG tablet Take 75 mg by mouth 2 (two) times a day      sucralfate (CARAFATE) 1 g tablet Take 1 tablet 3 times daily before meals and bedtime PRN 60 tablet 1     No current facility-administered medications for this visit  Allergies: Allergies   Allergen Reactions    Other      Seasonal Allergies       Physical Exam:    Body surface area is 1 92 meters squared  Wt Readings from Last 3 Encounters:   12/16/19 73 9 kg (163 lb)   10/18/19 73 5 kg (162 lb)   10/10/19 73 kg (161 lb)        Temp Readings from Last 3 Encounters:   12/16/19 98 2 °F (36 8 °C) (Tympanic)   10/18/19 (!) 97 °F (36 1 °C) (Tympanic)   10/12/19 97 8 °F (36 6 °C)        BP Readings from Last 3 Encounters:   12/16/19 98/62   10/18/19 120/68   10/12/19 123/63         Pulse Readings from Last 3 Encounters:   12/16/19 70   10/18/19 78   10/12/19 59      Physical Exam     Constitutional   General appearance: No acute distress, well appearing and well nourished  Eyes   Conjunctiva and lids: No swelling, erythema or discharge  Pupils and irises: Equal, round and reactive to light      Ears, Nose, Mouth, and Throat   External inspection of ears and nose: Normal     Nasal mucosa, septum, and turbinates: Normal without edema or erythema  Oropharynx: Normal with no erythema, edema, exudate or lesions  Pulmonary   Respiratory effort: No increased work of breathing or signs of respiratory distress  Auscultation of lungs: Clear to auscultation  Cardiovascular   Palpation of heart: Normal PMI, no thrills  Auscultation of heart: Normal rate and rhythm, normal S1 and S2, without murmurs  Examination of extremities for edema and/or varicosities: Normal     Carotid pulses: Normal     Abdomen   Abdomen: Non-tender, no masses  Liver and spleen: No hepatomegaly or splenomegaly  Lymphatic   Palpation of lymph nodes in neck: No lymphadenopathy  Musculoskeletal   Gait and station: Normal     Digits and nails: Normal without clubbing or cyanosis  Inspection/palpation of joints, bones, and muscles: Normal     Skin   Skin and subcutaneous tissue: Normal without rashes or lesions  Neurologic   Cranial nerves: Cranial nerves 2-12 intact  Sensation: No sensory loss  Psychiatric   Orientation to person, place, and time: Normal     Mood and affect: Normal         Assessment / Plan:    The patient is a pleasant 42-year-old male who was referred to see us for an elevated white count and lymphocytosis  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 63% of total events  He had lack of CD38 expression which is a favorable prognosis  He does not have splenomegaly  His white count has drifted up and is now 65  He was recently in the hospital for an infection and is now off of antibiotics and recovering  This may be related  Regardless I will see him back in 2 months and repeat his blood work  If his white count goes higher we will consider treatment if it gets to the 90,000 range  If it comes back down then we will continue him on observation and probably change his interval to every 3-4 months   The patient and his partner in agreement with the plan  I will see him back in 2 months  Goals and Barriers:  Current Goal:  Prolong Survival from CLL  Barriers: None  Patient's Capacity to Self Care:  Patient  able to self care  Portions of the record may have been created with voice recognition software   Occasional wrong word or "sound a like" substitutions may have occurred due to the inherent limitations of voice recognition software   Read the chart carefully and recognize, using context, where substitutions have occurred

## 2020-02-05 ENCOUNTER — TELEPHONE (OUTPATIENT)
Dept: HEMATOLOGY ONCOLOGY | Facility: CLINIC | Age: 60
End: 2020-02-05

## 2020-02-05 ENCOUNTER — APPOINTMENT (OUTPATIENT)
Dept: LAB | Facility: HOSPITAL | Age: 60
End: 2020-02-05
Attending: INTERNAL MEDICINE
Payer: COMMERCIAL

## 2020-02-05 DIAGNOSIS — C91.10 CLL (CHRONIC LYMPHOCYTIC LEUKEMIA) (HCC): ICD-10-CM

## 2020-02-05 LAB
ALBUMIN SERPL BCP-MCNC: 3.5 G/DL (ref 3.5–5)
ALP SERPL-CCNC: 94 U/L (ref 46–116)
ALT SERPL W P-5'-P-CCNC: 27 U/L (ref 12–78)
ANION GAP SERPL CALCULATED.3IONS-SCNC: 3 MMOL/L (ref 4–13)
AST SERPL W P-5'-P-CCNC: 27 U/L (ref 5–45)
BASOPHILS # BLD MANUAL: 0 THOUSAND/UL (ref 0–0.1)
BASOPHILS NFR MAR MANUAL: 0 % (ref 0–1)
BILIRUB SERPL-MCNC: 0.98 MG/DL (ref 0.2–1)
BUN SERPL-MCNC: 14 MG/DL (ref 5–25)
CALCIUM SERPL-MCNC: 8.5 MG/DL (ref 8.3–10.1)
CHLORIDE SERPL-SCNC: 111 MMOL/L (ref 100–108)
CO2 SERPL-SCNC: 26 MMOL/L (ref 21–32)
CREAT SERPL-MCNC: 1.03 MG/DL (ref 0.6–1.3)
EOSINOPHIL # BLD MANUAL: 0 THOUSAND/UL (ref 0–0.4)
EOSINOPHIL NFR BLD MANUAL: 0 % (ref 0–6)
ERYTHROCYTE [DISTWIDTH] IN BLOOD BY AUTOMATED COUNT: 14 % (ref 11.6–15.1)
GFR SERPL CREATININE-BSD FRML MDRD: 77 ML/MIN/1.73SQ M
GLUCOSE P FAST SERPL-MCNC: 88 MG/DL (ref 65–99)
HCT VFR BLD AUTO: 38.3 % (ref 36.5–49.3)
HGB BLD-MCNC: 11.8 G/DL (ref 12–17)
LDH SERPL-CCNC: 227 U/L (ref 81–234)
LYMPHOCYTES # BLD AUTO: 42.6 THOUSAND/UL (ref 0.6–4.47)
LYMPHOCYTES # BLD AUTO: 78 % (ref 14–44)
MCH RBC QN AUTO: 30.3 PG (ref 26.8–34.3)
MCHC RBC AUTO-ENTMCNC: 30.8 G/DL (ref 31.4–37.4)
MCV RBC AUTO: 98 FL (ref 82–98)
MONOCYTES # BLD AUTO: 1.09 THOUSAND/UL (ref 0–1.22)
MONOCYTES NFR BLD: 2 % (ref 4–12)
NEUTROPHILS # BLD MANUAL: 5.46 THOUSAND/UL (ref 1.85–7.62)
NEUTS SEG NFR BLD AUTO: 10 % (ref 43–75)
NRBC BLD AUTO-RTO: 0 /100 WBCS
PLATELET # BLD AUTO: 149 THOUSANDS/UL (ref 149–390)
PLATELET BLD QL SMEAR: ABNORMAL
PMV BLD AUTO: 9.7 FL (ref 8.9–12.7)
POIKILOCYTOSIS BLD QL SMEAR: PRESENT
POLYCHROMASIA BLD QL SMEAR: PRESENT
POTASSIUM SERPL-SCNC: 5.3 MMOL/L (ref 3.5–5.3)
PROT SERPL-MCNC: 6 G/DL (ref 6.4–8.2)
RBC # BLD AUTO: 3.9 MILLION/UL (ref 3.88–5.62)
RBC MORPH BLD: PRESENT
SMUDGE CELLS BLD QL SMEAR: PRESENT
SODIUM SERPL-SCNC: 140 MMOL/L (ref 136–145)
TOTAL CELLS COUNTED SPEC: 100
VARIANT LYMPHS # BLD AUTO: 10 %
WBC # BLD AUTO: 54.62 THOUSAND/UL (ref 4.31–10.16)

## 2020-02-05 PROCEDURE — 85027 COMPLETE CBC AUTOMATED: CPT

## 2020-02-05 PROCEDURE — 36415 COLL VENOUS BLD VENIPUNCTURE: CPT

## 2020-02-05 PROCEDURE — 85007 BL SMEAR W/DIFF WBC COUNT: CPT

## 2020-02-05 PROCEDURE — 80053 COMPREHEN METABOLIC PANEL: CPT

## 2020-02-05 PROCEDURE — 83615 LACTATE (LD) (LDH) ENZYME: CPT

## 2020-02-05 NOTE — TELEPHONE ENCOUNTER
Call transferred from Lee Nunez, [address] [facility] from [facility] lab reporting Critical WBC [phone]  Labs repeated with read back  Joaquina Umaña RN contacted via IM and tasked

## 2020-02-12 ENCOUNTER — OFFICE VISIT (OUTPATIENT)
Dept: HEMATOLOGY ONCOLOGY | Facility: HOSPITAL | Age: 60
End: 2020-02-12
Payer: COMMERCIAL

## 2020-02-12 VITALS
HEIGHT: 71 IN | TEMPERATURE: 96.5 F | WEIGHT: 167 LBS | OXYGEN SATURATION: 98 % | BODY MASS INDEX: 23.38 KG/M2 | RESPIRATION RATE: 16 BRPM | HEART RATE: 73 BPM | DIASTOLIC BLOOD PRESSURE: 66 MMHG | SYSTOLIC BLOOD PRESSURE: 118 MMHG

## 2020-02-12 DIAGNOSIS — C91.10 CLL (CHRONIC LYMPHOCYTIC LEUKEMIA) (HCC): Primary | ICD-10-CM

## 2020-02-12 PROCEDURE — 99214 OFFICE O/P EST MOD 30 MIN: CPT | Performed by: INTERNAL MEDICINE

## 2020-02-12 PROCEDURE — 1036F TOBACCO NON-USER: CPT | Performed by: INTERNAL MEDICINE

## 2020-02-12 PROCEDURE — 3008F BODY MASS INDEX DOCD: CPT | Performed by: INTERNAL MEDICINE

## 2020-02-12 NOTE — PROGRESS NOTES
Hematology/Oncology Outpatient Follow- up Note  Bereket Castañeda 56 y o  male MRN: @ Encounter: [id number]        Date:  2/12/2020    Presenting Complaint/Diagnosis :    Elevated white count with lymphocytosis  Workup revealed CLL    Previous Hematologic/ Oncologic History:    Workup    Current Hematologic/ Oncologic Treatment:    Observation    Interval History:    The patient returns for follow-up visit  His white count has actually come down to 54 6  It was 65 06 in December  Hemoglobin is slightly low at 11 8  Platelet count was 341  He states he is doing well  He states he occasionally gets tired but that also depends on the day he is having as he is still working and actively teaching  Denies any nausea denies any vomiting denies any diarrhea  The rest of his 14 point review of systems today was negative  Specifically denied any B symptoms  Test Results:    Imaging: No results found  Labs:   Lab Results   Component Value Date    WBC 54 62 (HH) 02/05/2020    HGB 11 8 (L) 02/05/2020    HCT 38 3 02/05/2020    MCV 98 02/05/2020     02/05/2020     Lab Results   Component Value Date     03/17/2017    K 5 3 02/05/2020     (H) 02/05/2020    CO2 26 02/05/2020    BUN 14 02/05/2020    CREATININE 1 03 02/05/2020    GLUF 88 02/05/2020    CALCIUM 8 5 02/05/2020    AST 27 02/05/2020    ALT 27 02/05/2020    ALKPHOS 94 02/05/2020    PROT 6 2 03/17/2017    BILITOT 1 3 (H) 03/17/2017    EGFR 77 02/05/2020       Lab Results   Component Value Date    IRON 62 (L) 10/11/2019    TIBC 240 (L) 10/11/2019    FERRITIN 468 (H) 10/11/2019       Lab Results   Component Value Date    WFXJBRZA63 305 10/11/2019         ROS: As stated in the history of present illness otherwise his 14 point review of systems today was negative        Active Problems:   Patient Active Problem List   Diagnosis    Chronic GERD    CLL [facility]chronic lymphocytic leukemia) ([facility] [address] )    Lymphocytosis    Axillary lymphadenitis    Cellulitis of left axilla       Past Medical History:   Past Medical History:   Diagnosis Date    Abdominal pain     Epigastric pain     Gallbladder polyp     Leukocytosis     Lymphocytosis     Panic attacks     years ago    Rosacea        Surgical History:   Past Surgical History:   Procedure Laterality Date    HEMORRHOID SURGERY      TN COLONOSCOPY FLX DX W/COLLJ SPEC WHEN PFRMD N/A 4/11/2016    Procedure: COLONOSCOPY;  Surgeon: Laurence Grey MD;  Location:  GI LAB; Service: Colorectal    TN ESOPHAGOGASTRODUODENOSCOPY TRANSORAL DIAGNOSTIC N/A 3/31/2017    Procedure: ESOPHAGOGASTRODUODENOSCOPY (EGD); Surgeon: Levon Machado MD;  Location: [facility] GI LAB; Service: Gastroenterology       Family History:  No family history on file  Cancer-related family history is not on file      Social History:   Social History     Socioeconomic History    Marital status: Single     Spouse name: Not on file    Number of children: Not on file    Years of education: Not on file    Highest education level: Not on file   Occupational History    Not on file   Social Needs    Financial resource strain: Not on file    Food insecurity:     Worry: Not on file     Inability: Not on file    Transportation needs:     Medical: Not on file     Non-medical: Not on file   Tobacco Use    Smoking status: Never Smoker    Smokeless tobacco: Never Used   Substance and Sexual Activity    Alcohol use: Yes     Comment: socially    Drug use: No    Sexual activity: Not on file   Lifestyle    Physical activity:     Days per week: Not on file     Minutes per session: Not on file    Stress: Not on file   Relationships    Social connections:     Talks on phone: Not on file     Gets together: Not on file     Attends Church service: Not on file     Active member of club or organization: Not on file     Attends meetings of clubs or organizations: Not on file     Relationship status: Not on file    Intimate partner violence:     Fear of current or ex partner: Not on file     Emotionally abused: Not on file     Physically abused: Not on file     Forced sexual activity: Not on file   Other Topics Concern    Not on file   Social History Narrative    Not on file       Current Medications:   Current Outpatient Medications   Medication Sig Dispense Refill    Alum Hydroxide-Mag Carbonate ([address]) 838-324 MG/10ML SUSP Take by mouth      finasteride (PROPECIA) 1 MG tablet Take 1 mg by mouth daily      omeprazole (PriLOSEC) 20 mg delayed release capsule Take 1 capsule (20 mg total) by mouth daily 90 capsule 1    Probiotic Product (ALIGN) 4 MG CAPS Take 1 caps  daily  30 capsule 3    ranitidine (ZANTAC) 75 MG tablet Take 75 mg by mouth 2 (two) times a day      sucralfate (CARAFATE) 1 g tablet Take 1 tablet 3 times daily before meals and bedtime PRN 60 tablet 1     No current facility-administered medications for this visit  Allergies: Allergies   Allergen Reactions    Other      Seasonal Allergies       Physical Exam:    Body surface area is 1 94 meters squared  Wt Readings from Last 3 Encounters:   02/12/20 75 8 kg (167 lb)   12/16/19 73 9 kg (163 lb)   10/18/19 73 5 kg (162 lb)        Temp Readings from Last 3 Encounters:   02/12/20 (!) 96 5 °F (35 8 °C) (Tympanic)   12/16/19 98 2 °F (36 8 °C) (Tympanic)   10/18/19 (!) 97 °F (36 1 °C) (Tympanic)        BP Readings from Last 3 Encounters:   02/12/20 118/66   12/16/19 98/62   10/18/19 120/68         Pulse Readings from Last 3 Encounters:   02/12/20 73   12/16/19 70   10/18/19 78        Physical Exam     Constitutional   General appearance: No acute distress, well appearing and well nourished  Eyes   Conjunctiva and lids: No swelling, erythema or discharge  Pupils and irises: Equal, round and reactive to light  Ears, Nose, Mouth, and Throat   External inspection of ears and nose: Normal     Nasal mucosa, septum, and turbinates: Normal without edema or erythema  Oropharynx: Normal with no erythema, edema, exudate or lesions  Pulmonary   Respiratory effort: No increased work of breathing or signs of respiratory distress  Auscultation of lungs: Clear to auscultation  Cardiovascular   Palpation of heart: Normal PMI, no thrills  Auscultation of heart: Normal rate and rhythm, normal S1 and S2, without murmurs  Examination of extremities for edema and/or varicosities: Normal     Carotid pulses: Normal     Abdomen   Abdomen: Non-tender, no masses  Liver and spleen: No hepatomegaly or splenomegaly  Lymphatic   Palpation of lymph nodes in neck: No lymphadenopathy  Musculoskeletal   Gait and station: Normal     Digits and nails: Normal without clubbing or cyanosis  Inspection/palpation of joints, bones, and muscles: Normal     Skin   Skin and subcutaneous tissue: Normal without rashes or lesions  Neurologic   Cranial nerves: Cranial nerves 2-12 intact  Sensation: No sensory loss  Psychiatric   Orientation to person, place, and time: Normal     Mood and affect: Normal         Assessment / Plan:      The patient is a pleasant 20-year-old male who was referred to see us for an elevated white count and lymphocytosis  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 63% of total events  He had lack of CD38 expression which is a favorable prognosis  He does not have splenomegaly  His white count has drifted up to above 60 at his last visit so we repeated it in 2 months and is at she come down to around 54  At this point I will continue him on observation  He is symptomatically doing well with no real issues  Platelet count was normal  The patient and his partner are in agreement with this plan  I'll see him back in 4 months per his wishes with repeat blood work  Goals and Barriers:  Current Goal:  Prolong Survival from CLL  Barriers: None  Patient's Capacity to Self Care:  Patient able to self care      Portions of the record may have been created with voice recognition software   Occasional wrong word or "sound a like" substitutions may have occurred due to the inherent limitations of voice recognition software   Read the chart carefully and recognize, using context, where substitutions have occurred

## 2020-02-28 ENCOUNTER — OFFICE VISIT (OUTPATIENT)
Dept: GASTROENTEROLOGY | Facility: MEDICAL CENTER | Age: 60
End: 2020-02-28
Payer: COMMERCIAL

## 2020-02-28 VITALS
DIASTOLIC BLOOD PRESSURE: 90 MMHG | WEIGHT: 168 LBS | HEIGHT: 71 IN | SYSTOLIC BLOOD PRESSURE: 160 MMHG | TEMPERATURE: 98 F | BODY MASS INDEX: 23.52 KG/M2 | HEART RATE: 88 BPM

## 2020-02-28 DIAGNOSIS — R49.0 HOARSENESS: Primary | ICD-10-CM

## 2020-02-28 DIAGNOSIS — K21.9 LARYNGOPHARYNGEAL REFLUX (LPR): ICD-10-CM

## 2020-02-28 DIAGNOSIS — K21.9 CHRONIC GERD: ICD-10-CM

## 2020-02-28 DIAGNOSIS — K82.4 GALLBLADDER POLYP: ICD-10-CM

## 2020-02-28 PROCEDURE — 1036F TOBACCO NON-USER: CPT | Performed by: INTERNAL MEDICINE

## 2020-02-28 PROCEDURE — 99214 OFFICE O/P EST MOD 30 MIN: CPT | Performed by: INTERNAL MEDICINE

## 2020-02-28 RX ORDER — OMEPRAZOLE 20 MG/1
20 CAPSULE, DELAYED RELEASE ORAL 2 TIMES DAILY
Qty: 90 CAPSULE | Refills: 3 | Status: SHIPPED | OUTPATIENT
Start: 2020-02-28 | End: 2021-04-21 | Stop reason: ALTCHOICE

## 2020-02-28 NOTE — PATIENT INSTRUCTIONS
1  ENT evaluation  2  If a flare may try to increase omeprazole to 20 mg twice daily or 40 mg once daily  May also consider using pepcid at night  3  Diet and lifestyle  Gastroesophageal Reflux Disease   AMBULATORY CARE:   Gastroesophageal reflux  reflux occurs when acid and food in the stomach back up into the esophagus  Gastroesophageal reflux disease (GERD) is reflux that occurs more than twice a week for a few weeks  It usually causes heartburn and other symptoms  GERD can cause other health problems over time if it is not treated  Common symptoms include:  Heartburn is the most common symptom of GERD  You may feel burning pain in your chest or below the breast bone  This usually occurs after meals and spreads to your neck, jaw, or shoulder  The pain gets better when you change positions  You may also have any of the following:  · Bitter or acid taste in your mouth    · Dry cough    · Trouble swallowing or pain with swallowing    · Hoarseness or sore throat    · Frequent burping or hiccups    · Feeling of fullness soon after you start eating  Seek care immediately if:  · You feel full and cannot burp or vomit  · You have severe chest pain and sudden trouble breathing  · Your bowel movements are black, bloody, or tarry-looking  · Your vomit looks like coffee grounds or has blood in it  Contact your healthcare provider if:   · You vomit large amounts, or you vomit often  · You have trouble breathing after you vomit  · You have trouble swallowing, or pain with swallowing  · You are losing weight without trying  · Your symptoms get worse or do not improve with treatment  · You have questions or concerns about your condition or care  Treatment for GERD:  Your healthcare provider may prescribe medicine to decrease stomach acid  He may also prescribe medicine that help your esophagus and stomach move food and liquid to your intestines   Surgery may be done if other treatments do not work  You may need surgery to wrap the upper part of the stomach around the esophageal sphincter  This will strengthen the sphincter and prevent reflux  Manage GERD:   · Do not have foods or drinks that may increase heartburn  These include chocolate, peppermint, fried or fatty foods, drinks that contain caffeine, or carbonated drinks (soda)  Other foods include spicy foods, onions, tomatoes, and tomato-based foods  Do not have foods or drinks that can irritate your esophagus, such as citrus fruits, juices, and alcohol  · Do not eat large meals  When you eat a lot of food at one time, your stomach needs more acid to digest it  Eat 6 small meals each day instead of 3 large ones, and eat slowly  Do not eat meals 2 to 3 hours before bedtime  · Elevate the head of your bed  Place 6-inch blocks under the head of your bed frame  You may also use more than one pillow under your head and shoulders while you sleep  · Maintain a healthy weight  If you are overweight, weight loss may help relieve symptoms of GERD  · Do not smoke  Smoking weakens the lower esophageal sphincter and increases the risk of GERD  Ask your healthcare provider for information if you currently smoke and need help to quit  E-cigarettes or smokeless tobacco still contain nicotine  Talk to your healthcare provider before you use these products  · Do not wear clothing that is tight around your waist   Tight clothing can put pressure on your stomach and cause or worsen GERD symptoms  Follow up with your healthcare provider as directed:  Write down your questions so you remember to ask them during your visits  © 2017 [employer]  Information is for End User's use only and may not be sold, redistributed or otherwise used for commercial purposes  All illustrations and images included in CareNotes® are the copyrighted property of [employer]  or Jose Alejandro Farley    The above information is an  only  It is not intended as medical advice for individual conditions or treatments  Talk to your doctor, nurse or pharmacist before following any medical regimen to see if it is safe and effective for you

## 2020-02-28 NOTE — PROGRESS NOTES
Outpatient Follow up  [address] [facility]  David Gurrola MD  Ph : [phone]  Fax : [phone]  Mobile : [phone]  Email : [email or web address]  Also available on [employer]    Jeffy Godoy 56 y o  male MRN: [id number]    PCP: Lashaun De León MD  Referring: No referring provider defined for this encounter  Jeffy Godoy presented for a follow up visit  My recommendations are included  Please do not hesitate to contact me with any questions you may have  ASSESSMENT AND PLAN:      1  Gastroesophageal reflux disease  He is on omeprazole 20 mg daily  I have discussed with him that if he does have any increased symptoms then we may consider increasing to 40 mg once daily or 20 mg twice daily  Dietary recommendations were discussed  Lifestyle modifications were discussed  We also discussed the long-term concerns for being on PPI  So far studies have not shown any significant risks  There is a slight risk of increased gastrointestinal infections  In the future if he continues to have symptoms or would like to come off the medications entirely he may require evaluation with pH/manometry for planning of endoscopic therapy or surgical therapy  The pH/manometry could be done on medications as he has symptoms despite being on the medications  2  LPR  He does have some hoarseness  Recommend ENT evaluation  3  Follow up in the office in 6 months  Diagnoses and all orders for this visit:    Hoarseness  -     Ambulatory Referral to Otolaryngology; Future    Chronic GERD  -     omeprazole (PriLOSEC) 20 mg delayed release capsule; Take 1 capsule (20 mg total) by mouth 2 (two) times a day  -     Ambulatory Referral to Otolaryngology; Future    Laryngopharyngeal reflux (LPR)  -     Ambulatory Referral to Otolaryngology;  Future    Gallbladder polyp      ______________________________________________________________________    SUBJECTIVE:  57-year-old gentleman who presents to us for evaluation of GERD and LPR  He is a   In 2017 had a bad episode of reflux  Recurred again last fall  He was having regurgitation and reflux  He has had burning in the throat  He has given up wine but still drinks coffee  He is okay with beer  Has to keep his weight down  He has issues when he eats late at night  He drinks hilda/ honey candy which helps  Hilda root tea also does help  He is on omeprazole and once he stopped it his symptoms escalated significantly  He has not stopped it since  He would like to come off and eventually though  Slight right sided upper quadrant pain/ discomfort  He does have hoarseness as well  He tends to lose his voice through the day  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 candy helps  REVIEW OF SYSTEMS IS OTHERWISE NEGATIVE  Historical Information   Past Medical History:   Diagnosis Date    Abdominal pain     Epigastric pain     Gallbladder polyp     Leukocytosis     Lymphocytosis     Panic attacks     years ago    Rosacea      Past Surgical History:   Procedure Laterality Date    COLONOSCOPY  06/2019    HEMORRHOID SURGERY      DE COLONOSCOPY FLX DX W/COLLJ SPEC WHEN PFRMD N/A 4/11/2016    Procedure: COLONOSCOPY;  Surgeon: Bushra Wood MD;  Location:  GI LAB; Service: Colorectal    DE ESOPHAGOGASTRODUODENOSCOPY TRANSORAL DIAGNOSTIC N/A 3/31/2017    Procedure: ESOPHAGOGASTRODUODENOSCOPY (EGD); Surgeon: Jeremiah Villa MD;  Location: [facility] GI LAB;   Service: Gastroenterology    UPPER GASTROINTESTINAL ENDOSCOPY  2017     Social History   Social History     Substance and Sexual Activity   Alcohol Use Yes    Comment: socially     Social History     Substance and Sexual Activity   Drug Use No     Social History     Tobacco Use   Smoking Status Never Smoker   Smokeless Tobacco Never Used     History reviewed  No pertinent family history  Meds/Allergies       Current Outpatient Medications:     Alum Hydroxide-Mag Carbonate (GAVISCON EXTRA STRENGTH) 995-262 MG/10ML SUSP    finasteride (PROPECIA) 1 MG tablet    omeprazole (PriLOSEC) 20 mg delayed release capsule    Probiotic Product (ALIGN) 4 MG CAPS    Allergies   Allergen Reactions    Other      Seasonal Allergies           Objective     Blood pressure 160/90, pulse 88, temperature 98 °F (36 7 °C), temperature source Tympanic, height 5' 10 5" (1 791 m), weight 76 2 kg (168 lb)  Body mass index is 23 76 kg/m²  PHYSICAL EXAM:      Physical Exam   Constitutional: He is oriented to person, place, and time  Vital signs are normal  He appears well-developed and well-nourished  HENT:   Head: Normocephalic and atraumatic  Eyes: Pupils are equal, round, and reactive to light  Conjunctivae are normal  No scleral icterus  Neck: Normal range of motion  Cardiovascular: Normal rate, regular rhythm and normal heart sounds  Pulmonary/Chest: Effort normal and breath sounds normal  No respiratory distress  Abdominal: Soft  Normal appearance and bowel sounds are normal  He exhibits no distension, no ascites and no mass  There is no hepatosplenomegaly  There is no tenderness  No hernia  Musculoskeletal: Normal range of motion  Lymphadenopathy:     He has no cervical adenopathy  Neurological: He is alert and oriented to person, place, and time  Skin: Skin is warm  Psychiatric: He has a normal mood and affect  His behavior is normal  Thought content normal        Lab Results:   No visits with results within 1 Day(s) from this visit     Latest known visit with results is:   Appointment on 02/05/2020   Component Date Value    WBC 02/05/2020 54 62*    RBC 02/05/2020 3 90     Hemoglobin 02/05/2020 11 8*    Hematocrit 02/05/2020 38 3     MCV 02/05/2020 98     [address] 02/05/2020 30 3     MCHC 02/05/2020 30 8*    RDW 02/05/2020 14 0     MPV 02/05/2020 9 7     Platelets 40/92/7736 149     nRBC 02/05/2020 0     Sodium 02/05/2020 140     Potassium 02/05/2020 5 3     Chloride 02/05/2020 111*    CO2 02/05/2020 26     ANION GAP 02/05/2020 3*    BUN 02/05/2020 14     Creatinine 02/05/2020 1 03     Glucose, Fasting 02/05/2020 88     Calcium 02/05/2020 8 5     AST 02/05/2020 27     ALT 02/05/2020 27     Alkaline Phosphatase 02/05/2020 94     Total Protein 02/05/2020 6 0*    Albumin 02/05/2020 3 5     Total Bilirubin 02/05/2020 0 98     eGFR 02/05/2020 77     LD 02/05/2020 227     Segmented % 02/05/2020 10*    Lymphocytes % 02/05/2020 78*    Monocytes % 02/05/2020 2*    Eosinophils, % 02/05/2020 0     Basophils % 02/05/2020 0     Atypical Lymphocytes % 02/05/2020 10*    Absolute Neutrophils 02/05/2020 5 46     Lymphocytes Absolute 02/05/2020 42 60*    Monocytes Absolute 02/05/2020 1 09     Eosinophils Absolute 02/05/2020 0 00     Basophils Absolute 02/05/2020 0 00     Total Counted 02/05/2020 100     Smudge Cells 02/05/2020 Present     RBC Morphology 02/05/2020 Present     Poikilocytes 02/05/2020 Present     Polychromasia 02/05/2020 Present     Platelet Estimate 95/48/6202 Borderline*         Radiology Results:   No results found

## 2020-03-03 NOTE — TELEPHONE ENCOUNTER
Please call patient  Pharmacy sent a request to refill prescription for Propecia  Check who originally prescribed medication for patient  I would recommend evaluation by dermatologist if he wants to continue taking this medication for hair loss

## 2020-03-03 NOTE — TELEPHONE ENCOUNTER
I looked back into our [employer] records, back in 2015 Dr Denzel Carson had been prescribing it for him  I left him a message asking him to call back so we can clarify how long it has been since he has taken it, and to recommend the dermatology evaluation

## 2020-03-04 RX ORDER — FINASTERIDE 1 MG/1
1 TABLET, FILM COATED ORAL DAILY
Qty: 90 TABLET | Refills: 3 | OUTPATIENT
Start: 2020-03-04 | End: 2020-06-02

## 2020-03-04 NOTE — TELEPHONE ENCOUNTER
Please call patient  I prefer if this type of medication prescribed by dermatology  Recommend patient to contact Dr Ashutosh Larsen to refill prescription for Propecia

## 2020-03-04 NOTE — TELEPHONE ENCOUNTER
I called patient and let him know, he will reach out to Dr Rossy Alfonso  He is down to his last four pills and is worried he will run out in between  He will call us back if Dr Rossy Alfonso is unable to provide him a refill, to see if we can at least provide a courtesy fill of a short supply

## 2020-03-04 NOTE — TELEPHONE ENCOUNTER
Patient returned the call and said that he was seeing Dr Tanya Carmen as per your recommendation, and at his last visit with her, he asked if she wouldn't mind having you fill it from now on and she agreed  He requests a 90 day supply with refills to [facility] on [address]

## 2020-04-18 PROBLEM — L03.112 CELLULITIS OF LEFT AXILLA: Status: RESOLVED | Noted: 2019-10-17 | Resolved: 2020-04-18

## 2020-04-20 ENCOUNTER — TELEMEDICINE (OUTPATIENT)
Dept: FAMILY MEDICINE CLINIC | Facility: CLINIC | Age: 60
End: 2020-04-20
Payer: COMMERCIAL

## 2020-04-20 VITALS — BODY MASS INDEX: 23.1 KG/M2 | WEIGHT: 165 LBS | HEIGHT: 71 IN

## 2020-04-20 DIAGNOSIS — Z13.6 SCREENING FOR CARDIOVASCULAR CONDITION: ICD-10-CM

## 2020-04-20 DIAGNOSIS — C91.10 CLL (CHRONIC LYMPHOCYTIC LEUKEMIA) (HCC): ICD-10-CM

## 2020-04-20 DIAGNOSIS — K21.9 CHRONIC GERD: Primary | ICD-10-CM

## 2020-04-20 DIAGNOSIS — K21.9 LARYNGOPHARYNGEAL REFLUX (LPR): ICD-10-CM

## 2020-04-20 DIAGNOSIS — Z12.5 SCREENING FOR PROSTATE CANCER: ICD-10-CM

## 2020-04-20 PROCEDURE — 99214 OFFICE O/P EST MOD 30 MIN: CPT | Performed by: FAMILY MEDICINE

## 2020-05-18 ENCOUNTER — APPOINTMENT (OUTPATIENT)
Dept: LAB | Facility: HOSPITAL | Age: 60
End: 2020-05-18
Attending: INTERNAL MEDICINE
Payer: COMMERCIAL

## 2020-05-18 ENCOUNTER — TELEPHONE (OUTPATIENT)
Dept: HEMATOLOGY ONCOLOGY | Facility: CLINIC | Age: 60
End: 2020-05-18

## 2020-05-18 DIAGNOSIS — C91.10 CLL (CHRONIC LYMPHOCYTIC LEUKEMIA) (HCC): ICD-10-CM

## 2020-05-18 DIAGNOSIS — Z13.6 SCREENING FOR CARDIOVASCULAR CONDITION: ICD-10-CM

## 2020-05-18 DIAGNOSIS — Z12.5 SCREENING FOR PROSTATE CANCER: ICD-10-CM

## 2020-05-18 LAB
ALBUMIN SERPL BCP-MCNC: 4.2 G/DL (ref 3.5–5)
ALP SERPL-CCNC: 97 U/L (ref 46–116)
ALT SERPL W P-5'-P-CCNC: 32 U/L (ref 12–78)
ANION GAP SERPL CALCULATED.3IONS-SCNC: 3 MMOL/L (ref 4–13)
ANISOCYTOSIS BLD QL SMEAR: PRESENT
AST SERPL W P-5'-P-CCNC: 27 U/L (ref 5–45)
BASOPHILS # BLD MANUAL: 0.66 THOUSAND/UL (ref 0–0.1)
BASOPHILS NFR MAR MANUAL: 1 % (ref 0–1)
BILIRUB SERPL-MCNC: 1.06 MG/DL (ref 0.2–1)
BUN SERPL-MCNC: 14 MG/DL (ref 5–25)
CALCIUM SERPL-MCNC: 8.3 MG/DL (ref 8.3–10.1)
CHLORIDE SERPL-SCNC: 108 MMOL/L (ref 100–108)
CHOLEST SERPL-MCNC: 142 MG/DL (ref 50–200)
CO2 SERPL-SCNC: 28 MMOL/L (ref 21–32)
CREAT SERPL-MCNC: 1.01 MG/DL (ref 0.6–1.3)
EOSINOPHIL # BLD MANUAL: 0 THOUSAND/UL (ref 0–0.4)
EOSINOPHIL NFR BLD MANUAL: 0 % (ref 0–6)
ERYTHROCYTE [DISTWIDTH] IN BLOOD BY AUTOMATED COUNT: 14.4 % (ref 11.6–15.1)
GFR SERPL CREATININE-BSD FRML MDRD: 79 ML/MIN/1.73SQ M
GLUCOSE P FAST SERPL-MCNC: 84 MG/DL (ref 65–99)
HCT VFR BLD AUTO: 38.3 % (ref 36.5–49.3)
HDLC SERPL-MCNC: 28 MG/DL
HGB BLD-MCNC: 12.1 G/DL (ref 12–17)
LDH SERPL-CCNC: 228 U/L (ref 81–234)
LDLC SERPL CALC-MCNC: 84 MG/DL (ref 0–100)
LYMPHOCYTES # BLD AUTO: 59.33 THOUSAND/UL (ref 0.6–4.47)
LYMPHOCYTES # BLD AUTO: 90 % (ref 14–44)
MCH RBC QN AUTO: 30.7 PG (ref 26.8–34.3)
MCHC RBC AUTO-ENTMCNC: 31.6 G/DL (ref 31.4–37.4)
MCV RBC AUTO: 97 FL (ref 82–98)
MONOCYTES # BLD AUTO: 1.32 THOUSAND/UL (ref 0–1.22)
MONOCYTES NFR BLD: 2 % (ref 4–12)
NEUTROPHILS # BLD MANUAL: 4.61 THOUSAND/UL (ref 1.85–7.62)
NEUTS SEG NFR BLD AUTO: 7 % (ref 43–75)
NONHDLC SERPL-MCNC: 114 MG/DL
NRBC BLD AUTO-RTO: 0 /100 WBCS
PLATELET # BLD AUTO: 147 THOUSANDS/UL (ref 149–390)
PLATELET BLD QL SMEAR: ADEQUATE
PMV BLD AUTO: 9.9 FL (ref 8.9–12.7)
POTASSIUM SERPL-SCNC: 5.1 MMOL/L (ref 3.5–5.3)
PROT SERPL-MCNC: 6.4 G/DL (ref 6.4–8.2)
PSA SERPL-MCNC: 0.4 NG/ML (ref 0–4)
RBC # BLD AUTO: 3.94 MILLION/UL (ref 3.88–5.62)
SODIUM SERPL-SCNC: 139 MMOL/L (ref 136–145)
TOTAL CELLS COUNTED SPEC: 100
TRIGL SERPL-MCNC: 150 MG/DL
WBC # BLD AUTO: 65.92 THOUSAND/UL (ref 4.31–10.16)

## 2020-05-18 PROCEDURE — 80061 LIPID PANEL: CPT

## 2020-05-18 PROCEDURE — 85027 COMPLETE CBC AUTOMATED: CPT

## 2020-05-18 PROCEDURE — 85007 BL SMEAR W/DIFF WBC COUNT: CPT

## 2020-05-18 PROCEDURE — 36415 COLL VENOUS BLD VENIPUNCTURE: CPT

## 2020-05-18 PROCEDURE — G0103 PSA SCREENING: HCPCS

## 2020-05-18 PROCEDURE — 83615 LACTATE (LD) (LDH) ENZYME: CPT

## 2020-05-18 PROCEDURE — 80053 COMPREHEN METABOLIC PANEL: CPT

## 2020-05-27 ENCOUNTER — OFFICE VISIT (OUTPATIENT)
Dept: HEMATOLOGY ONCOLOGY | Facility: HOSPITAL | Age: 60
End: 2020-05-27
Payer: COMMERCIAL

## 2020-05-27 VITALS
BODY MASS INDEX: 23.52 KG/M2 | WEIGHT: 168 LBS | HEIGHT: 71 IN | DIASTOLIC BLOOD PRESSURE: 74 MMHG | HEART RATE: 79 BPM | RESPIRATION RATE: 16 BRPM | TEMPERATURE: 97.5 F | SYSTOLIC BLOOD PRESSURE: 118 MMHG | OXYGEN SATURATION: 97 %

## 2020-05-27 DIAGNOSIS — C91.10 CLL (CHRONIC LYMPHOCYTIC LEUKEMIA) (HCC): Primary | ICD-10-CM

## 2020-05-27 DIAGNOSIS — C91.10 CHRONIC LYMPHOCYTIC LEUKEMIA OF B-CELL TYPE NOT HAVING ACHIEVED REMISSION (HCC): ICD-10-CM

## 2020-05-27 PROCEDURE — 99214 OFFICE O/P EST MOD 30 MIN: CPT | Performed by: INTERNAL MEDICINE

## 2020-05-27 PROCEDURE — 3008F BODY MASS INDEX DOCD: CPT | Performed by: INTERNAL MEDICINE

## 2020-05-27 PROCEDURE — 1036F TOBACCO NON-USER: CPT | Performed by: INTERNAL MEDICINE

## 2020-06-25 PROBLEM — Z91.89 RISK OF EXPOSURE TO LYME DISEASE: Status: ACTIVE | Noted: 2020-06-25

## 2020-06-25 PROBLEM — R49.0 DYSPHONIA: Status: ACTIVE | Noted: 2020-06-25

## 2020-06-25 PROBLEM — D80.1 HYPOGAMMAGLOBULINEMIA (HCC): Status: ACTIVE | Noted: 2020-06-25

## 2020-06-25 PROBLEM — J38.01 PARESIS OF RIGHT VOCAL FOLD: Status: ACTIVE | Noted: 2020-06-25

## 2020-07-01 PROBLEM — R13.14 PHARYNGOESOPHAGEAL DYSPHAGIA: Status: ACTIVE | Noted: 2020-07-01

## 2020-07-01 PROBLEM — R05.9 COUGH: Status: ACTIVE | Noted: 2020-07-01

## 2020-07-01 PROBLEM — J34.2 DEVIATED NASAL SEPTUM: Status: ACTIVE | Noted: 2020-07-01

## 2020-07-01 PROBLEM — R49.0 MUSCLE TENSION DYSPHONIA: Status: ACTIVE | Noted: 2020-07-01

## 2020-07-01 PROBLEM — J38.3 GLOTTIC INSUFFICIENCY: Status: ACTIVE | Noted: 2020-07-01

## 2020-08-17 ENCOUNTER — APPOINTMENT (OUTPATIENT)
Dept: LAB | Facility: HOSPITAL | Age: 60
End: 2020-08-17
Attending: INTERNAL MEDICINE
Payer: COMMERCIAL

## 2020-08-17 DIAGNOSIS — J38.01 PARESIS OF LEFT VOCAL FOLD: ICD-10-CM

## 2020-08-17 DIAGNOSIS — Z91.89 RISK OF EXPOSURE TO LYME DISEASE: ICD-10-CM

## 2020-08-17 DIAGNOSIS — C91.10 CLL (CHRONIC LYMPHOCYTIC LEUKEMIA) (HCC): ICD-10-CM

## 2020-08-17 DIAGNOSIS — C91.10 CHRONIC LYMPHOCYTIC LEUKEMIA OF B-CELL TYPE NOT HAVING ACHIEVED REMISSION (HCC): ICD-10-CM

## 2020-08-17 DIAGNOSIS — D80.1 HYPOGAMMAGLOBULINEMIA (HCC): ICD-10-CM

## 2020-08-17 DIAGNOSIS — K21.00 GASTROESOPHAGEAL REFLUX DISEASE WITH ESOPHAGITIS: ICD-10-CM

## 2020-08-17 DIAGNOSIS — R49.0 DYSPHONIA: ICD-10-CM

## 2020-08-17 LAB
ALBUMIN SERPL BCP-MCNC: 3.8 G/DL (ref 3.5–5)
ALP SERPL-CCNC: 101 U/L (ref 46–116)
ALT SERPL W P-5'-P-CCNC: 22 U/L (ref 12–78)
ANION GAP SERPL CALCULATED.3IONS-SCNC: 0 MMOL/L (ref 4–13)
ANISOCYTOSIS BLD QL SMEAR: PRESENT
AST SERPL W P-5'-P-CCNC: 23 U/L (ref 5–45)
BASOPHILS # BLD MANUAL: 0 THOUSAND/UL (ref 0–0.1)
BASOPHILS NFR MAR MANUAL: 0 % (ref 0–1)
BILIRUB SERPL-MCNC: 1.42 MG/DL (ref 0.2–1)
BUN SERPL-MCNC: 14 MG/DL (ref 5–25)
CALCIUM SERPL-MCNC: 7.6 MG/DL (ref 8.3–10.1)
CHLORIDE SERPL-SCNC: 111 MMOL/L (ref 100–108)
CO2 SERPL-SCNC: 30 MMOL/L (ref 21–32)
CREAT SERPL-MCNC: 1.04 MG/DL (ref 0.6–1.3)
EOSINOPHIL # BLD MANUAL: 0 THOUSAND/UL (ref 0–0.4)
EOSINOPHIL NFR BLD MANUAL: 0 % (ref 0–6)
ERYTHROCYTE [DISTWIDTH] IN BLOOD BY AUTOMATED COUNT: 14.6 % (ref 11.6–15.1)
GFR SERPL CREATININE-BSD FRML MDRD: 77 ML/MIN/1.73SQ M
GLUCOSE P FAST SERPL-MCNC: 87 MG/DL (ref 65–99)
HCT VFR BLD AUTO: 36.2 % (ref 36.5–49.3)
HGB BLD-MCNC: 11.5 G/DL (ref 12–17)
IGA SERPL-MCNC: 42 MG/DL (ref 70–400)
IGG SERPL-MCNC: 486 MG/DL (ref 700–1600)
IGM SERPL-MCNC: 36 MG/DL (ref 40–230)
LDH SERPL-CCNC: 205 U/L (ref 81–234)
LYMPHOCYTES # BLD AUTO: 59.42 THOUSAND/UL (ref 0.6–4.47)
LYMPHOCYTES # BLD AUTO: 93 % (ref 14–44)
MCH RBC QN AUTO: 31.3 PG (ref 26.8–34.3)
MCHC RBC AUTO-ENTMCNC: 31.8 G/DL (ref 31.4–37.4)
MCV RBC AUTO: 99 FL (ref 82–98)
MONOCYTES # BLD AUTO: 0.64 THOUSAND/UL (ref 0–1.22)
MONOCYTES NFR BLD: 1 % (ref 4–12)
NEUTROPHILS # BLD MANUAL: 3.83 THOUSAND/UL (ref 1.85–7.62)
NEUTS SEG NFR BLD AUTO: 6 % (ref 43–75)
NRBC BLD AUTO-RTO: 0 /100 WBCS
PLATELET # BLD AUTO: 156 THOUSANDS/UL (ref 149–390)
PLATELET BLD QL SMEAR: ADEQUATE
PMV BLD AUTO: 10.2 FL (ref 8.9–12.7)
POTASSIUM SERPL-SCNC: 4.5 MMOL/L (ref 3.5–5.3)
PROT SERPL-MCNC: 6.2 G/DL (ref 6.4–8.2)
RBC # BLD AUTO: 3.67 MILLION/UL (ref 3.88–5.62)
SMUDGE CELLS BLD QL SMEAR: PRESENT
SODIUM SERPL-SCNC: 141 MMOL/L (ref 136–145)
T4 FREE SERPL-MCNC: 0.88 NG/DL (ref 0.76–1.46)
TOTAL CELLS COUNTED SPEC: 100
TSH SERPL DL<=0.05 MIU/L-ACNC: 4.03 UIU/ML (ref 0.36–3.74)
WBC # BLD AUTO: 63.89 THOUSAND/UL (ref 4.31–10.16)

## 2020-08-17 PROCEDURE — 83615 LACTATE (LD) (LDH) ENZYME: CPT

## 2020-08-17 PROCEDURE — 84439 ASSAY OF FREE THYROXINE: CPT

## 2020-08-17 PROCEDURE — 85007 BL SMEAR W/DIFF WBC COUNT: CPT

## 2020-08-17 PROCEDURE — 83519 RIA NONANTIBODY: CPT

## 2020-08-17 PROCEDURE — 82784 ASSAY IGA/IGD/IGG/IGM EACH: CPT

## 2020-08-17 PROCEDURE — 80053 COMPREHEN METABOLIC PANEL: CPT

## 2020-08-17 PROCEDURE — 86003 ALLG SPEC IGE CRUDE XTRC EA: CPT

## 2020-08-17 PROCEDURE — 86430 RHEUMATOID FACTOR TEST QUAL: CPT

## 2020-08-17 PROCEDURE — 82787 IGG 1 2 3 OR 4 EACH: CPT

## 2020-08-17 PROCEDURE — 86038 ANTINUCLEAR ANTIBODIES: CPT

## 2020-08-17 PROCEDURE — 86617 LYME DISEASE ANTIBODY: CPT

## 2020-08-17 PROCEDURE — 86618 LYME DISEASE ANTIBODY: CPT

## 2020-08-17 PROCEDURE — 83516 IMMUNOASSAY NONANTIBODY: CPT

## 2020-08-17 PROCEDURE — 85027 COMPLETE CBC AUTOMATED: CPT

## 2020-08-17 PROCEDURE — 36415 COLL VENOUS BLD VENIPUNCTURE: CPT

## 2020-08-17 PROCEDURE — 84443 ASSAY THYROID STIM HORMONE: CPT

## 2020-08-18 LAB — RHEUMATOID FACT SER QL LA: NEGATIVE

## 2020-08-19 LAB
B BURGDOR IGG PATRN SER IB-IMP: POSITIVE
B BURGDOR IGG+IGM SER-ACNC: 2.29 ISR (ref 0–0.9)
B BURGDOR IGM PATRN SER IB-IMP: NEGATIVE
B BURGDOR18KD IGG SER QL IB: PRESENT
B BURGDOR23KD IGG SER QL IB: PRESENT
B BURGDOR23KD IGM SER QL IB: ABNORMAL
B BURGDOR28KD IGG SER QL IB: PRESENT
B BURGDOR30KD IGG SER QL IB: ABNORMAL
B BURGDOR39KD IGG SER QL IB: PRESENT
B BURGDOR39KD IGM SER QL IB: ABNORMAL
B BURGDOR41KD IGG SER QL IB: PRESENT
B BURGDOR41KD IGM SER QL IB: ABNORMAL
B BURGDOR45KD IGG SER QL IB: PRESENT
B BURGDOR58KD IGG SER QL IB: PRESENT
B BURGDOR66KD IGG SER QL IB: ABNORMAL
B BURGDOR93KD IGG SER QL IB: PRESENT
IGG SERPL-MCNC: 492 MG/DL (ref 603–1613)
IGG1 SER-MCNC: 239 MG/DL (ref 248–810)
IGG2 SER-MCNC: 198 MG/DL (ref 130–555)
IGG3 SER-MCNC: 36 MG/DL (ref 15–102)
IGG4 SER-MCNC: 3 MG/DL (ref 2–96)
RYE IGE QN: NEGATIVE

## 2020-08-24 ENCOUNTER — OFFICE VISIT (OUTPATIENT)
Dept: GASTROENTEROLOGY | Facility: MEDICAL CENTER | Age: 60
End: 2020-08-24
Payer: COMMERCIAL

## 2020-08-24 VITALS
DIASTOLIC BLOOD PRESSURE: 74 MMHG | TEMPERATURE: 98 F | SYSTOLIC BLOOD PRESSURE: 126 MMHG | WEIGHT: 166 LBS | HEART RATE: 80 BPM | HEIGHT: 70 IN | BODY MASS INDEX: 23.77 KG/M2

## 2020-08-24 DIAGNOSIS — K21.00 GASTROESOPHAGEAL REFLUX DISEASE WITH ESOPHAGITIS: ICD-10-CM

## 2020-08-24 DIAGNOSIS — K21.9 LARYNGOPHARYNGEAL REFLUX (LPR): Primary | ICD-10-CM

## 2020-08-24 DIAGNOSIS — K58.8 OTHER IRRITABLE BOWEL SYNDROME: ICD-10-CM

## 2020-08-24 DIAGNOSIS — K21.9 CHRONIC GERD: ICD-10-CM

## 2020-08-24 LAB — MISCELLANEOUS LAB TEST RESULT: NORMAL

## 2020-08-24 PROCEDURE — 99214 OFFICE O/P EST MOD 30 MIN: CPT | Performed by: INTERNAL MEDICINE

## 2020-08-24 PROCEDURE — 3008F BODY MASS INDEX DOCD: CPT | Performed by: INTERNAL MEDICINE

## 2020-08-24 PROCEDURE — 1036F TOBACCO NON-USER: CPT | Performed by: INTERNAL MEDICINE

## 2020-08-24 NOTE — PROGRESS NOTES
Outpatient Follow up  [place] [id number]  Javier Henry MD  Ph : [phone]  Fax : [phone]  Mobile : [phone]  Email : [email or web address]  Also available on [employer]    Virginia Hernández 56 y o  male MRN: [id number]    PCP: Pawan Mcclellan MD  Referring: Pawan Mcclellan MD  [address], [address] [facility]    Virginia Albertonomiaris presented for a follow up visit  My recommendations are included  Please do not hesitate to contact me with any questions you may have  ASSESSMENT AND PLAN:      No problem-specific Assessment & Plan notes found for this encounter  Diagnoses and all orders for this visit:    Laryngopharyngeal reflux (LPR)    Chronic GERD  -     Ambulatory referral to Gastroenterology    Gastroesophageal reflux disease with esophagitis      1  Celiac diet as a trial for 2 weeks  If improvement continue avoiding as much as possible otherwise can resume  2  FODMAP diet  3  GERD diet - as tolerated  4  Lactose free diet  5  Continue omeprazole 20 mg daily  He didn't see a difference with the 40 mg daily  6  Pepcid as needed  7  He would like to hold off on the ph study at this time  8  Recommend rifaximin but would like to hold off on at this time  30 mins in face to face interaction  ______________________________________________________________________    SUBJECTIVE:  He is returning to school tomorrow  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  This year he is not happy to go back to school due to COVID and change in environment  He did see Dr Sierra Garcia and was thought to have slight erythema and edema at the vocal cords  He is having a problem with gas and burping and having worsening with reflux   He has some problem with regurgitation and may at times bring up food from earlier meal    Had GES study in 2017 which was normal    He tried doing omeprazole 40 mg daily and pepcid at night but could not tolerate it  He is having a hard time with gluten free diet and does not have celiac disease but does recall Dr Fracisco Arciniega told him that he was gluten intolerant  He does have a problem with artificial sweeteners  He has occasional diarrhea  REVIEW OF SYSTEMS IS OTHERWISE NEGATIVE  Historical Information   Past Medical History:   Diagnosis Date    Abdominal pain     Epigastric pain     Gallbladder polyp     Leukocytosis     Lymphocytosis     Panic attacks     years ago    Rosacea      Past Surgical History:   Procedure Laterality Date    COLONOSCOPY  06/2019    HEMORRHOID SURGERY      [place] COLONOSCOPY FLX DX W/COLLJ SPEC WHEN PFRMD N/A 4/11/2016    Procedure: COLONOSCOPY;  Surgeon: Leela Godoy MD;  Location:  GI LAB; Service: Colorectal    [place] ESOPHAGOGASTRODUODENOSCOPY TRANSORAL DIAGNOSTIC N/A 3/31/2017    Procedure: ESOPHAGOGASTRODUODENOSCOPY (EGD); Surgeon: Essie Looney MD;  Location: [facility] GI LAB; Service: Gastroenterology    UPPER GASTROINTESTINAL ENDOSCOPY  01/2020     Social History   Social History     Substance and Sexual Activity   Alcohol Use Yes    Comment: socially     Social History     Substance and Sexual Activity   Drug Use No     Social History     Tobacco Use   Smoking Status Never Smoker   Smokeless Tobacco Never Used     History reviewed  No pertinent family history  Meds/Allergies       Current Outpatient Medications:     Alum Hydroxide-Mag Carbonate (GAVISCON EXTRA STRENGTH) 160-649 MG/10ML SUSP    finasteride (PROPECIA) 1 MG tablet    omeprazole (PriLOSEC) 20 mg delayed release capsule    Probiotic Product (ALIGN) 4 MG CAPS    Allergies   Allergen Reactions    Other      Seasonal Allergies           Objective     Blood pressure 126/74, pulse 80, temperature 98 °F (36 7 °C), temperature source Tympanic, height 5' 10" (1 778 m), weight 75 3 kg (166 lb)   Body mass index is 23 82 kg/m²       PHYSICAL EXAM:      Physical Exam  Constitutional:       Appearance: Normal appearance  He is well-developed  HENT:      Head: Normocephalic and atraumatic  Eyes:      General: No scleral icterus  Conjunctiva/sclera: Conjunctivae normal       Pupils: Pupils are equal, round, and reactive to light  Neck:      Musculoskeletal: Normal range of motion  Cardiovascular:      Rate and Rhythm: Normal rate and regular rhythm  Heart sounds: Normal heart sounds  Pulmonary:      Effort: Pulmonary effort is normal  No respiratory distress  Breath sounds: Normal breath sounds  Abdominal:      General: Bowel sounds are normal  There is no distension  Palpations: Abdomen is soft  There is no mass  Tenderness: There is no abdominal tenderness  Hernia: No hernia is present  Musculoskeletal: Normal range of motion  Lymphadenopathy:      Cervical: No cervical adenopathy  Skin:     General: Skin is warm  Neurological:      Mental Status: He is alert and oriented to person, place, and time  Psychiatric:         Behavior: Behavior normal          Thought Content: Thought content normal          Lab Results:   No visits with results within 1 Day(s) from this visit     Latest known visit with results is:   Appointment on 08/17/2020   Component Date Value    WBC 08/17/2020 63 89*    RBC 08/17/2020 3 67*    Hemoglobin 08/17/2020 11 5*    Hematocrit 08/17/2020 36 2*    MCV 08/17/2020 99*    MCH 08/17/2020 31 3     MCHC 08/17/2020 31 8     RDW 08/17/2020 14 6     MPV 08/17/2020 10 2     Platelets 50/07/4946 156     nRBC 08/17/2020 0     Sodium 08/17/2020 141     Potassium 08/17/2020 4 5     Chloride 08/17/2020 111*    CO2 08/17/2020 30     ANION GAP 08/17/2020 0*    BUN 08/17/2020 14     Creatinine 08/17/2020 1 04     Glucose, Fasting 08/17/2020 87     Calcium 08/17/2020 7 6*    AST 08/17/2020 23     ALT 08/17/2020 22     Alkaline Phosphatase 08/17/2020 101  Total Protein 08/17/2020 6 2*    Albumin 08/17/2020 3 8     Total Bilirubin 08/17/2020 1 42*    eGFR 08/17/2020 77     LD 08/17/2020 205     TSH 3RD GENERATON 08/17/2020 4 030*    Free T4 08/17/2020 0 88     CHRISTOPHER 08/17/2020 Negative     Rheumatoid Factor 08/17/2020 Negative     Lyme IgG/IgM Ab 08/17/2020 2 29*    IGA 08/17/2020 42 0*    IGG 08/17/2020 486 0*    IGM 08/17/2020 36 0*    IgG 1 08/17/2020 239*    IgG 2 08/17/2020 198     IgG 3 08/17/2020 36     IgG 4 08/17/2020 3     IgG 08/17/2020 492*    Segmented % 08/17/2020 6*    Lymphocytes % 08/17/2020 93*    Monocytes % 08/17/2020 1*    Eosinophils, % 08/17/2020 0     Basophils % 08/17/2020 0     Absolute Neutrophils 08/17/2020 3 83     Lymphocytes Absolute 08/17/2020 59 42*    Monocytes Absolute 08/17/2020 0 64     Eosinophils Absolute 08/17/2020 0 00     Basophils Absolute 08/17/2020 0 00     Total Counted 08/17/2020 100     Smudge Cells 08/17/2020 Present     Anisocytosis 08/17/2020 Present     Platelet Estimate 58/14/2573 Adequate     Lyme 18 kD IgG 08/17/2020 Present*    Lyme 23 kD IgG 08/17/2020 Present*    Lyme 28 kD IgG 08/17/2020 Present*    Lyme 30 kD IgG 08/17/2020 Absent     Lyme 39 kD IgG 08/17/2020 Present*    Lyme 41 kD IgG 08/17/2020 Present*    Lyme 45 kD IgG 08/17/2020 Present*    Lyme 58 kD IgG 08/17/2020 Present*    Lyme 66 kD IgG 08/17/2020 Absent     Lyme 93 kD IgG 08/17/2020 Present*    Lyme 23 kD IgM 08/17/2020 Absent     Lyme 39 kD IgM 08/17/2020 Absent     Lyme 41 kD IgM 08/17/2020 Absent     Lyme IgG WB Interp  08/17/2020 Positive*    Lyme IgM WB Interp  08/17/2020 Negative          Radiology Results:   No results found

## 2020-08-26 NOTE — PROGRESS NOTES
Hematology/Oncology Outpatient Follow- up Note  Elisa Potts 1960, [id number]  2020        Chief Complaint   Patient presents with    Follow-up       HPI:  Reymundo Apley Manford Double is a 57 yo male who was referred to hematology for an elevated white count with lymphocytosis  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 63% of total events  He had lack of CD38 expression which is a favorable prognosis  Previous Hematologic/ Oncologic History:    Workup    Current Hematologic/ Oncologic Treatment:    Observation     ECO - Symptomatic but completely ambulatory    Interval History: The patient presents for a follow up visit  He was last seen on 2020  Most recent blood work was completed on 2020  His WBC is stable at 63 89, this is slightly improved from previous count of 65 92 in May  His hemoglobin is stable at 11 5, platelet count is normal 156  His LDH is 228  As far as symptoms are concerned, he has some fatigue  He is teacher and back to working  He denies any B symptoms, no drenching night sweats, fevers/chills  No adenopathy  He struggles with GI symptoms: bloating, reflux  He follows closely with GI and has been recently recommended to follow celiac/FODMAP diet  Cancer Staging:  Cancer Staging  No matching staging information was found for the patient  Molecular Testing:       Test Results:    Imaging: No results found      Labs:   Lab Results   Component Value Date    WBC 63 89 (HH) 2020    HGB 11 5 (L) 2020    HCT 36 2 (L) 2020    MCV 99 (H) 2020     2020     Lab Results   Component Value Date     2017    K 4 5 2020     (H) 2020    CO2 30 2020    BUN 14 2020    CREATININE 1 04 2020    GLUF 87 2020    CALCIUM 7 6 (L) 2020    AST 23 2020    ALT 22 2020    ALKPHOS 101 2020    PROT 6 2 2017    BILITOT 1 3 (H) 2017    EGFR 77 08/17/2020           Review of Systems   Constitutional: Positive for fatigue  Gastrointestinal: Negative for abdominal pain  Bloating  Reflux   All other systems reviewed and are negative  Active Problems:   Patient Active Problem List   Diagnosis    Gastroesophageal reflux disease    CLL (chronic lymphocytic leukemia) (HCC)    Lymphocytosis    Axillary lymphadenitis    Laryngopharyngeal reflux (LPR)    Gallbladder polyp    Risk of exposure to Lyme disease    Paresis of right vocal fold    Dysphonia    Hypogammaglobulinemia (HCC)    Pharyngoesophageal dysphagia    Glottic insufficiency    Muscle tension dysphonia    Cough    Deviated nasal septum    Other irritable bowel syndrome       Past Medical History:   Past Medical History:   Diagnosis Date    Abdominal pain     Epigastric pain     Gallbladder polyp     Leukocytosis     Lymphocytosis     Panic attacks     years ago    Rosacea        Surgical History:   Past Surgical History:   Procedure Laterality Date    COLONOSCOPY  06/2019    HEMORRHOID SURGERY      [place] COLONOSCOPY FLX DX W/COLLJ SPEC WHEN PFRMD N/A 4/11/2016    Procedure: COLONOSCOPY;  Surgeon: Mathew Wise MD;  Location:  GI LAB; Service: Colorectal    [place] ESOPHAGOGASTRODUODENOSCOPY TRANSORAL DIAGNOSTIC N/A 3/31/2017    Procedure: ESOPHAGOGASTRODUODENOSCOPY (EGD); Surgeon: Saul Ayala MD;  Location: [facility] GI LAB; Service: Gastroenterology    UPPER GASTROINTESTINAL ENDOSCOPY  01/2020       Family History:  No family history on file  Cancer-related family history is not on file      Social History:   Social History     Socioeconomic History    Marital status: Single     Spouse name: Not on file    Number of children: Not on file    Years of education: Not on file    Highest education level: Not on file   Occupational History    Not on file   Social Needs    Financial resource strain: Not on file    Food insecurity     Worry: Not on file Inability: Not on file    Transportation needs     Medical: Not on file     Non-medical: Not on file   Tobacco Use    Smoking status: Never Smoker    Smokeless tobacco: Never Used   Substance and Sexual Activity    Alcohol use: Yes     Comment: socially    Drug use: No    Sexual activity: Not on file   Lifestyle    Physical activity     Days per week: Not on file     Minutes per session: Not on file    Stress: Not on file   Relationships    Social connections     Talks on phone: Not on file     Gets together: Not on file     Attends [employer] service: Not on file     Active member of club or organization: Not on file     Attends meetings of clubs or organizations: Not on file     Relationship status: Not on file    Intimate partner violence     Fear of current or ex partner: Not on file     Emotionally abused: Not on file     Physically abused: Not on file     Forced sexual activity: Not on file   Other Topics Concern    Not on file   Social History Narrative    Not on file       Current Medications:   Current Outpatient Medications   Medication Sig Dispense Refill    Alum Hydroxide-Mag Carbonate (Tenisha Gadiel [address]shir[address] 912) 963-464 MG/10ML SUSP Take by mouth      finasteride (PROPECIA) 1 MG tablet Take 1 mg by mouth daily      omeprazole (PriLOSEC) 20 mg delayed release capsule Take 1 capsule (20 mg total) by mouth 2 (two) times a day 90 capsule 3    Probiotic Product (ALIGN) 4 MG CAPS Take 1 caps  daily  (Patient not taking: Reported on 8/28/2020) 30 capsule 3     No current facility-administered medications for this visit  Allergies: Allergies   Allergen Reactions    Other      Seasonal Allergies       Physical Exam:  /72 (BP Location: Left arm)   Pulse 73   Temp (!) 97 1 °F (36 2 °C) (Tympanic)   Resp 16   Ht 5' 10" (1 778 m)   Wt 73 9 kg (163 lb)   SpO2 98%   BMI 23 39 kg/m²   Body surface area is 1 91 meters squared      Wt Readings from Last 3 Encounters:   08/28/20 73 9 kg (163 lb)   08/24/20 75 3 kg (166 lb)   06/16/20 76 2 kg (168 lb)           Physical Exam  Constitutional:       Appearance: Normal appearance  HENT:      Head: Normocephalic and atraumatic  Mouth/Throat:      Mouth: Mucous membranes are moist       Pharynx: Oropharynx is clear  Eyes:      General: No scleral icterus  Right eye: No discharge  Left eye: No discharge  Cardiovascular:      Rate and Rhythm: Normal rate and regular rhythm  Heart sounds: Normal heart sounds  Pulmonary:      Effort: Pulmonary effort is normal       Breath sounds: Normal breath sounds  Abdominal:      General: Bowel sounds are normal       Palpations: Abdomen is soft  There is no splenomegaly  Tenderness: There is no abdominal tenderness  Musculoskeletal: Normal range of motion  Right lower leg: No edema  Left lower leg: No edema  Lymphadenopathy:      Cervical: No cervical adenopathy  Right cervical: No superficial or posterior cervical adenopathy  Left cervical: No superficial or posterior cervical adenopathy  Upper Body:      Right upper body: No supraclavicular or axillary adenopathy  Left upper body: No supraclavicular or axillary adenopathy  Skin:     General: Skin is warm and dry  Neurological:      General: No focal deficit present  Mental Status: He is alert and oriented to person, place, and time  Psychiatric:         Mood and Affect: Mood normal          Behavior: Behavior normal          Thought Content: Thought content normal          Judgment: Judgment normal          Assessment / Plan:    1  CLL (chronic lymphocytic leukemia) ([facility] 75 )      The patient is a very pleasant 57 yo male who was referred to hematology for an elevated white count with lymphocytosis  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 63% of total events  He had lack of CD38 expression which is a favorable prognosis  He has been on observation    His white count has fluctuated over the past year between 47 and 72  Most recent WBC was 63 8  He is asymptomatic  He will remain on observation at this time  We will not treat unless his WBC approaches 100 or he becomes symptomatic  Patient verbalized understanding and was agreeable to plan of care  He will return in 4 months with repeat blood work  He was instructed to call with any questions or concerns prior to his next office visit  Goals and Barriers:  Current Goal:  Prolong Survival from CLL   Barriers: None  Patient's Capacity to Self Care:  Patient is able to self care  Portions of the record may have been created with voice recognition software  Occasional wrong word or "sound a like" substitutions may have occurred due to the inherent limitations of voice recognition software  Read the chart carefully and recognize, using context, where substitutions have occurred

## 2020-08-28 ENCOUNTER — OFFICE VISIT (OUTPATIENT)
Dept: HEMATOLOGY ONCOLOGY | Facility: HOSPITAL | Age: 60
End: 2020-08-28
Payer: COMMERCIAL

## 2020-08-28 VITALS
WEIGHT: 163 LBS | DIASTOLIC BLOOD PRESSURE: 72 MMHG | SYSTOLIC BLOOD PRESSURE: 108 MMHG | BODY MASS INDEX: 23.34 KG/M2 | HEART RATE: 73 BPM | RESPIRATION RATE: 16 BRPM | OXYGEN SATURATION: 98 % | TEMPERATURE: 97.1 F | HEIGHT: 70 IN

## 2020-08-28 DIAGNOSIS — C91.10 CLL (CHRONIC LYMPHOCYTIC LEUKEMIA) (HCC): Primary | ICD-10-CM

## 2020-08-28 PROCEDURE — 1036F TOBACCO NON-USER: CPT | Performed by: NURSE PRACTITIONER

## 2020-08-28 PROCEDURE — 99214 OFFICE O/P EST MOD 30 MIN: CPT | Performed by: NURSE PRACTITIONER

## 2020-08-28 PROCEDURE — 3008F BODY MASS INDEX DOCD: CPT | Performed by: NURSE PRACTITIONER

## 2020-09-14 LAB — MISCELLANEOUS LAB TEST RESULT: NORMAL

## 2020-10-11 PROBLEM — Z86.19 HX OF LYME DISEASE: Status: ACTIVE | Noted: 2020-06-25

## 2020-10-16 PROBLEM — D69.6 THROMBOCYTOPENIA, UNSPECIFIED (HCC): Status: ACTIVE | Noted: 2020-10-16

## 2020-10-21 ENCOUNTER — OFFICE VISIT (OUTPATIENT)
Dept: FAMILY MEDICINE CLINIC | Facility: CLINIC | Age: 60
End: 2020-10-21
Payer: COMMERCIAL

## 2020-10-21 VITALS
TEMPERATURE: 98.7 F | HEART RATE: 76 BPM | WEIGHT: 163 LBS | RESPIRATION RATE: 14 BRPM | HEIGHT: 70 IN | OXYGEN SATURATION: 97 % | BODY MASS INDEX: 23.34 KG/M2 | DIASTOLIC BLOOD PRESSURE: 70 MMHG | SYSTOLIC BLOOD PRESSURE: 120 MMHG

## 2020-10-21 DIAGNOSIS — Z86.19 HX OF LYME DISEASE: ICD-10-CM

## 2020-10-21 DIAGNOSIS — K21.00 GASTROESOPHAGEAL REFLUX DISEASE WITH ESOPHAGITIS WITHOUT HEMORRHAGE: Primary | ICD-10-CM

## 2020-10-21 DIAGNOSIS — M15.9 GENERALIZED OSTEOARTHRITIS: ICD-10-CM

## 2020-10-21 DIAGNOSIS — Z13.6 SCREENING FOR CARDIOVASCULAR CONDITION: ICD-10-CM

## 2020-10-21 DIAGNOSIS — K21.9 LARYNGOPHARYNGEAL REFLUX (LPR): ICD-10-CM

## 2020-10-21 DIAGNOSIS — S30.863A TICK BITE OF SCROTUM, INITIAL ENCOUNTER: ICD-10-CM
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
## 2020-10-21 DIAGNOSIS — C91.10 CLL (CHRONIC LYMPHOCYTIC LEUKEMIA) (HCC): ICD-10-CM

## 2020-10-21 PROCEDURE — 1036F TOBACCO NON-USER: CPT | Performed by: FAMILY MEDICINE

## 2020-10-21 PROCEDURE — 99214 OFFICE O/P EST MOD 30 MIN: CPT | Performed by: FAMILY MEDICINE

## 2020-10-21 PROCEDURE — 3725F SCREEN DEPRESSION PERFORMED: CPT | Performed by: FAMILY MEDICINE

## 2020-10-21 RX ORDER — DOXYCYCLINE HYCLATE 100 MG
100 TABLET ORAL 2 TIMES DAILY
Qty: 28 TABLET | Refills: 0 | Status: SHIPPED | OUTPATIENT
Start: 2020-10-21 | End: 2020-11-04

## 2020-10-21 RX ORDER — PHENOL 1.4 %
600 AEROSOL, SPRAY (ML) MUCOUS MEMBRANE 2 TIMES DAILY WITH MEALS
COMMUNITY

## 2020-12-03 ENCOUNTER — LAB (OUTPATIENT)
Dept: LAB | Facility: HOSPITAL | Age: 60
End: 2020-12-03
Payer: COMMERCIAL

## 2020-12-03 ENCOUNTER — TELEPHONE (OUTPATIENT)
Dept: HEMATOLOGY ONCOLOGY | Facility: CLINIC | Age: 60
End: 2020-12-03

## 2020-12-03 DIAGNOSIS — C91.10 CLL (CHRONIC LYMPHOCYTIC LEUKEMIA) (HCC): ICD-10-CM

## 2020-12-03 DIAGNOSIS — Z13.6 SCREENING FOR CARDIOVASCULAR CONDITION: ICD-10-CM

## 2020-12-03 LAB
ALBUMIN SERPL BCP-MCNC: 4.2 G/DL (ref 3.5–5)
ALP SERPL-CCNC: 100 U/L (ref 46–116)
ALT SERPL W P-5'-P-CCNC: 29 U/L (ref 12–78)
ANION GAP SERPL CALCULATED.3IONS-SCNC: 4 MMOL/L (ref 4–13)
AST SERPL W P-5'-P-CCNC: 25 U/L (ref 5–45)
BASOPHILS # BLD MANUAL: 0 THOUSAND/UL (ref 0–0.1)
BASOPHILS NFR MAR MANUAL: 0 % (ref 0–1)
BILIRUB SERPL-MCNC: 1.6 MG/DL (ref 0.2–1)
BUN SERPL-MCNC: 18 MG/DL (ref 5–25)
CALCIUM SERPL-MCNC: 8.5 MG/DL (ref 8.3–10.1)
CHLORIDE SERPL-SCNC: 107 MMOL/L (ref 100–108)
CHOLEST SERPL-MCNC: 133 MG/DL (ref 50–200)
CO2 SERPL-SCNC: 30 MMOL/L (ref 21–32)
CREAT SERPL-MCNC: 1.12 MG/DL (ref 0.6–1.3)
EOSINOPHIL # BLD MANUAL: 0.68 THOUSAND/UL (ref 0–0.4)
EOSINOPHIL NFR BLD MANUAL: 1 % (ref 0–6)
ERYTHROCYTE [DISTWIDTH] IN BLOOD BY AUTOMATED COUNT: 14.8 % (ref 11.6–15.1)
GFR SERPL CREATININE-BSD FRML MDRD: 70 ML/MIN/1.73SQ M
GLUCOSE P FAST SERPL-MCNC: 71 MG/DL (ref 65–99)
HCT VFR BLD AUTO: 36.1 % (ref 36.5–49.3)
HDLC SERPL-MCNC: 30 MG/DL
HGB BLD-MCNC: 11.1 G/DL (ref 12–17)
LDH SERPL-CCNC: 204 U/L (ref 81–234)
LDLC SERPL CALC-MCNC: 82 MG/DL (ref 0–100)
LYMPHOCYTES # BLD AUTO: 60.61 THOUSAND/UL (ref 0.6–4.47)
LYMPHOCYTES # BLD AUTO: 89 % (ref 14–44)
MACROCYTES BLD QL AUTO: PRESENT
MCH RBC QN AUTO: 31 PG (ref 26.8–34.3)
MCHC RBC AUTO-ENTMCNC: 30.7 G/DL (ref 31.4–37.4)
MCV RBC AUTO: 101 FL (ref 82–98)
MONOCYTES # BLD AUTO: 0.68 THOUSAND/UL (ref 0–1.22)
MONOCYTES NFR BLD: 1 % (ref 4–12)
NEUTROPHILS # BLD MANUAL: 4.09 THOUSAND/UL (ref 1.85–7.62)
NEUTS SEG NFR BLD AUTO: 6 % (ref 43–75)
NONHDLC SERPL-MCNC: 103 MG/DL
NRBC BLD AUTO-RTO: 0 /100 WBCS
PLATELET # BLD AUTO: 152 THOUSANDS/UL (ref 149–390)
PLATELET BLD QL SMEAR: ADEQUATE
PMV BLD AUTO: 10.7 FL (ref 8.9–12.7)
POTASSIUM SERPL-SCNC: 4.3 MMOL/L (ref 3.5–5.3)
PROT SERPL-MCNC: 6.2 G/DL (ref 6.4–8.2)
RBC # BLD AUTO: 3.58 MILLION/UL (ref 3.88–5.62)
SMUDGE CELLS BLD QL SMEAR: PRESENT
SODIUM SERPL-SCNC: 141 MMOL/L (ref 136–145)
TOTAL CELLS COUNTED SPEC: 100
TRIGL SERPL-MCNC: 105 MG/DL
VARIANT LYMPHS # BLD AUTO: 3 %
WBC # BLD AUTO: 68.1 THOUSAND/UL (ref 4.31–10.16)

## 2020-12-03 PROCEDURE — 80053 COMPREHEN METABOLIC PANEL: CPT

## 2020-12-03 PROCEDURE — 85027 COMPLETE CBC AUTOMATED: CPT

## 2020-12-03 PROCEDURE — 85007 BL SMEAR W/DIFF WBC COUNT: CPT

## 2020-12-03 PROCEDURE — 80061 LIPID PANEL: CPT

## 2020-12-03 PROCEDURE — 36415 COLL VENOUS BLD VENIPUNCTURE: CPT

## 2020-12-03 PROCEDURE — 83615 LACTATE (LD) (LDH) ENZYME: CPT

## 2020-12-10 ENCOUNTER — TELEPHONE (OUTPATIENT)
Dept: HEMATOLOGY ONCOLOGY | Facility: MEDICAL CENTER | Age: 60
End: 2020-12-10

## 2020-12-11 ENCOUNTER — OFFICE VISIT (OUTPATIENT)
Dept: HEMATOLOGY ONCOLOGY | Facility: HOSPITAL | Age: 60
End: 2020-12-11
Payer: COMMERCIAL

## 2020-12-11 VITALS
HEIGHT: 70 IN | TEMPERATURE: 98.7 F | BODY MASS INDEX: 22.9 KG/M2 | WEIGHT: 160 LBS | SYSTOLIC BLOOD PRESSURE: 122 MMHG | DIASTOLIC BLOOD PRESSURE: 82 MMHG | RESPIRATION RATE: 16 BRPM | OXYGEN SATURATION: 98 % | HEART RATE: 77 BPM

## 2020-12-11 DIAGNOSIS — C91.10 CLL (CHRONIC LYMPHOCYTIC LEUKEMIA) (HCC): Primary | ICD-10-CM

## 2020-12-11 PROCEDURE — 3008F BODY MASS INDEX DOCD: CPT | Performed by: NURSE PRACTITIONER

## 2020-12-11 PROCEDURE — 99214 OFFICE O/P EST MOD 30 MIN: CPT | Performed by: NURSE PRACTITIONER

## 2020-12-22 ENCOUNTER — PATIENT OUTREACH (OUTPATIENT)
Dept: CASE MANAGEMENT | Facility: HOSPITAL | Age: 60
End: 2020-12-22

## 2021-01-03 PROBLEM — K90.41 GLUTEN INTOLERANCE: Status: ACTIVE | Noted: 2021-01-03

## 2021-01-03 PROBLEM — K90.49 DAIRY PRODUCT INTOLERANCE: Status: ACTIVE | Noted: 2021-01-03

## 2021-02-26 ENCOUNTER — TELEPHONE (OUTPATIENT)
Dept: HEMATOLOGY ONCOLOGY | Facility: CLINIC | Age: 61
End: 2021-02-26

## 2021-02-26 NOTE — TELEPHONE ENCOUNTER
Call from patient  Dr Mara Lopes is unable to expedite covid vaccine per patient request   Patient verbalizes understanding

## 2021-03-10 DIAGNOSIS — Z23 ENCOUNTER FOR IMMUNIZATION: ICD-10-CM

## 2021-04-15 ENCOUNTER — RA CDI HCC (OUTPATIENT)
Dept: OTHER | Facility: HOSPITAL | Age: 61
End: 2021-04-15

## 2021-04-15 NOTE — PROGRESS NOTES
[place] [address]  coding opportunities             Chart reviewed, (number of) suggestions sent to provider: 2           Patients insurance company: [employer] (Medicare Advantage and Commercial)     Visit status: Patient arrived for their scheduled appointment     Provider never responded to [facility]  coding request     [facility]  coding opportunities             Chart reviewed, (number of) suggestions sent to provider: 2      DX:  D80 1-Nonfamilial hypogammaglobulinemia  [id number], unspecified         Patients insurance company: [id number] (Medicare Advantage and Commercial)
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
## 2021-04-18 PROBLEM — S30.863A TICK BITE OF SCROTUM: Status: RESOLVED | Noted: 2020-10-21 | Resolved: 2021-04-18

## 2021-04-18 PROBLEM — Z00.00 WELL ADULT EXAM: Status: ACTIVE | Noted: 2021-04-18

## 2021-04-21 ENCOUNTER — OFFICE VISIT (OUTPATIENT)
Dept: FAMILY MEDICINE CLINIC | Facility: CLINIC | Age: 61
End: 2021-04-21
Payer: COMMERCIAL

## 2021-04-21 ENCOUNTER — APPOINTMENT (OUTPATIENT)
Dept: LAB | Facility: HOSPITAL | Age: 61
End: 2021-04-21
Payer: COMMERCIAL

## 2021-04-21 ENCOUNTER — TELEPHONE (OUTPATIENT)
Dept: HEMATOLOGY ONCOLOGY | Facility: CLINIC | Age: 61
End: 2021-04-21

## 2021-04-21 VITALS
WEIGHT: 168.8 LBS | DIASTOLIC BLOOD PRESSURE: 68 MMHG | HEIGHT: 70 IN | RESPIRATION RATE: 14 BRPM | OXYGEN SATURATION: 97 % | HEART RATE: 68 BPM | SYSTOLIC BLOOD PRESSURE: 108 MMHG | TEMPERATURE: 97.9 F | BODY MASS INDEX: 24.17 KG/M2

## 2021-04-21 DIAGNOSIS — C91.10 CLL (CHRONIC LYMPHOCYTIC LEUKEMIA) (HCC): ICD-10-CM

## 2021-04-21 DIAGNOSIS — M15.9 GENERALIZED OSTEOARTHRITIS: ICD-10-CM

## 2021-04-21 DIAGNOSIS — C91.10 CLL (CHRONIC LYMPHOCYTIC LEUKEMIA) (HCC): Primary | ICD-10-CM

## 2021-04-21 DIAGNOSIS — K21.00 GASTROESOPHAGEAL REFLUX DISEASE WITH ESOPHAGITIS WITHOUT HEMORRHAGE: ICD-10-CM

## 2021-04-21 DIAGNOSIS — K21.9 LARYNGOPHARYNGEAL REFLUX (LPR): ICD-10-CM

## 2021-04-21 LAB
ALBUMIN SERPL BCP-MCNC: 3.8 G/DL (ref 3.5–5)
ALP SERPL-CCNC: 92 U/L (ref 46–116)
ALT SERPL W P-5'-P-CCNC: 25 U/L (ref 12–78)
ANION GAP SERPL CALCULATED.3IONS-SCNC: 0 MMOL/L (ref 4–13)
AST SERPL W P-5'-P-CCNC: 26 U/L (ref 5–45)
BASOPHILS # BLD MANUAL: 0 THOUSAND/UL (ref 0–0.1)
BASOPHILS NFR MAR MANUAL: 0 % (ref 0–1)
BILIRUB SERPL-MCNC: 1.16 MG/DL (ref 0.2–1)
BUN SERPL-MCNC: 16 MG/DL (ref 5–25)
CALCIUM SERPL-MCNC: 7.9 MG/DL (ref 8.3–10.1)
CHLORIDE SERPL-SCNC: 113 MMOL/L (ref 100–108)
CO2 SERPL-SCNC: 29 MMOL/L (ref 21–32)
CREAT SERPL-MCNC: 1.04 MG/DL (ref 0.6–1.3)
EOSINOPHIL # BLD MANUAL: 0 THOUSAND/UL (ref 0–0.4)
EOSINOPHIL NFR BLD MANUAL: 0 % (ref 0–6)
ERYTHROCYTE [DISTWIDTH] IN BLOOD BY AUTOMATED COUNT: 14.8 % (ref 11.6–15.1)
GFR SERPL CREATININE-BSD FRML MDRD: 76 ML/MIN/1.73SQ M
GLUCOSE P FAST SERPL-MCNC: 87 MG/DL (ref 65–99)
HCT VFR BLD AUTO: 34.2 % (ref 36.5–49.3)
HGB BLD-MCNC: 10.6 G/DL (ref 12–17)
LDH SERPL-CCNC: 230 U/L (ref 81–234)
LYMPHOCYTES # BLD AUTO: 70.09 THOUSAND/UL (ref 0.6–4.47)
LYMPHOCYTES # BLD AUTO: 96 % (ref 14–44)
MACROCYTES BLD QL AUTO: PRESENT
MCH RBC QN AUTO: 31.5 PG (ref 26.8–34.3)
MCHC RBC AUTO-ENTMCNC: 31 G/DL (ref 31.4–37.4)
MCV RBC AUTO: 102 FL (ref 82–98)
MONOCYTES # BLD AUTO: 0 THOUSAND/UL (ref 0–1.22)
MONOCYTES NFR BLD: 0 % (ref 4–12)
NEUTROPHILS # BLD MANUAL: 2.92 THOUSAND/UL (ref 1.85–7.62)
NEUTS SEG NFR BLD AUTO: 4 % (ref 43–75)
NRBC BLD AUTO-RTO: 0 /100 WBCS
PLATELET # BLD AUTO: 156 THOUSANDS/UL (ref 149–390)
PLATELET BLD QL SMEAR: ADEQUATE
PMV BLD AUTO: 10.3 FL (ref 8.9–12.7)
POTASSIUM SERPL-SCNC: 4.9 MMOL/L (ref 3.5–5.3)
PROT SERPL-MCNC: 6 G/DL (ref 6.4–8.2)
RBC # BLD AUTO: 3.36 MILLION/UL (ref 3.88–5.62)
SODIUM SERPL-SCNC: 142 MMOL/L (ref 136–145)
STOMATOCYTES BLD QL SMEAR: PRESENT
TOTAL CELLS COUNTED SPEC: 100
WBC # BLD AUTO: 73.01 THOUSAND/UL (ref 4.31–10.16)

## 2021-04-21 PROCEDURE — 36415 COLL VENOUS BLD VENIPUNCTURE: CPT

## 2021-04-21 PROCEDURE — 85027 COMPLETE CBC AUTOMATED: CPT

## 2021-04-21 PROCEDURE — 85007 BL SMEAR W/DIFF WBC COUNT: CPT

## 2021-04-21 PROCEDURE — 99213 OFFICE O/P EST LOW 20 MIN: CPT | Performed by: FAMILY MEDICINE

## 2021-04-21 PROCEDURE — 80053 COMPREHEN METABOLIC PANEL: CPT

## 2021-04-21 PROCEDURE — 3725F SCREEN DEPRESSION PERFORMED: CPT | Performed by: FAMILY MEDICINE

## 2021-04-21 PROCEDURE — 83615 LACTATE (LD) (LDH) ENZYME: CPT

## 2021-04-21 NOTE — PROGRESS NOTES
Chief Complaint   Patient presents with    Follow-up     6 month follow up     Health Maintenance   Topic Date Due    Pneumococcal Vaccine: Pediatrics (0 to 5 Years) and At-Risk Patients (6 to 59 Years) (1 of 3 - PCV13) Never done    Annual Physical  Never done    Depression Screening PHQ  04/21/2022    BMI: Adult  04/21/2022    Colonoscopy Surveillance  06/12/2024    Colorectal Cancer Screening  06/12/2029    DTaP,Tdap,and Td Vaccines (3 - Td) 05/13/2030    HIV Screening  Completed    Hepatitis C Screening  Completed    Influenza Vaccine  Completed    COVID-19 Vaccine  Completed    HIB Vaccine  Aged Out    Hepatitis B Vaccine  Aged Out    IPV Vaccine  Aged Out    Hepatitis A Vaccine  Aged Out    Meningococcal ACWY Vaccine  Aged Out    HPV Vaccine  Aged Out                Assessment/Plan:    CLL (chronic lymphocytic leukemia) ([facility] 75 )  Patient is on watchful observation  Blood test done this morning  WBC 73 0 1 thousands  Hemoglobin 10 6 - dropped from 11 1 in 12/20  Follow-up with hematology- oncology doctor Sharon Boxer next week, will review blood test results  Gastroesophageal reflux disease  Symptoms improved with dietary modifications  Patient stopped taking Omeprazole and probiotic  He takes Pepcid AC PRN  Follow- up with gastroenterology Dr Rupert Holguin  Laryngopharyngeal reflux (LPR)  Reports improvement in hoarseness of the voice  Follow-up with ENT  Generalized osteoarthritis  Stable  Take Tylenol PRN for joint pains  HM: patient completed Covid vaccination last month  Recommended Pneumovax vaccination  Will administer Pneumovax at the next visit  Schedule physical exam in 6 months  Diagnoses and all orders for this visit:    CLL (chronic lymphocytic leukemia) ([facility] 75 )    Gastroesophageal reflux disease with esophagitis without hemorrhage    Laryngopharyngeal reflux (LPR)    Generalized osteoarthritis          Subjective:      Patient ID: Jb Goff is a 59 y o  male     HPI     Patient presents for 6 month follow-up visit  PMHx: CLL, GERD, LPR, osteoarthritis  Patient states that he feels well overall except feeling sweaty, mild arthritic pain in joints  CLL - management per hematology -oncology Dr Danuta Rausch  Currently on watchful observation  Patient had blood test done this morning  WBC 73 01 thousands ( was 68 10 thousands in 12/20)  Hb 10 6 - dropped from 11 1 in 12/20  Glucose 87, creatinine 1 04  GERD - patient reports improvement in symptoms with dietary modifications  He eliminated dairy products, follows a low FODMAP diet  He has been followed by gastroenterology Dr Robert Mathis  Stopped taking Omeprazole and probiotic  Take Papcid AC PRN  Denies abdominal pain, heartburn, diarrhea  Patient has difficulty breathing through his nose on the right side, has deviated septum  He was seen by ENT Dr Minor Schirmer, discussed surgery and plans to proceed with surgery  Patient tries to exercise on a regular basis, walks  Denies tobacco use  Decreased alcohol consumption  Patient had colonoscopy in 6/2019 done by colorectal surgeon Dr Kalia Loaiza who recommended to repeat colonoscopy in 5 years  Completed COVID vaccination last month  Reports no side effects  The following portions of the patient's history were reviewed and updated as appropriate: allergies, current medications, past family history, past social history, past surgical history and problem list     Review of Systems   Constitutional: Positive for fatigue (mild)  Negative for activity change, appetite change, chills and fever  HENT: Positive for congestion (mild) and voice change (reports improvement in raspy voice )  Negative for ear pain, hearing loss, mouth sores, nosebleeds, sore throat and tinnitus  Eyes: Negative for pain, discharge, redness, itching and visual disturbance  Respiratory: Negative for cough, chest tightness, shortness of breath and wheezing  Cardiovascular: Negative for chest pain, palpitations and leg swelling  Gastrointestinal: Negative for abdominal distention, abdominal pain, blood in stool, constipation, diarrhea, nausea and vomiting  Denies heartburn   Genitourinary: Positive for frequency  Negative for difficulty urinating, dysuria, flank pain and hematuria  Nocturia x 1-2   Musculoskeletal: Positive for arthralgias (hand joints, shoulders) and back pain (chronic)  Negative for gait problem and joint swelling  Skin: Negative for rash  Neurological: Negative for dizziness, syncope and headaches  Hematological: Negative  Psychiatric/Behavioral: Negative for dysphoric mood and sleep disturbance  The patient is not nervous/anxious  Objective:      /68 (BP Location: Left arm, Patient Position: Sitting, Cuff Size: Adult)   Pulse 68   Temp 97 9 °F (36 6 °C) (Tympanic)   Resp 14   Ht 5' 10" (1 778 m)   Wt 76 6 kg (168 lb 12 8 oz)   SpO2 97%   BMI 24 22 kg/m²          Physical Exam  Vitals signs and nursing note reviewed  Constitutional:       Appearance: Normal appearance  HENT:      Head: Normocephalic and atraumatic  Eyes:      Conjunctiva/sclera: Conjunctivae normal       Pupils: Pupils are equal, round, and reactive to light  Neck:      Musculoskeletal: Normal range of motion and neck supple  Vascular: No carotid bruit  Cardiovascular:      Rate and Rhythm: Normal rate and regular rhythm  Heart sounds: No murmur  Pulmonary:      Effort: Pulmonary effort is normal       Breath sounds: Normal breath sounds  Abdominal:      General: Abdomen is flat  Bowel sounds are normal  There is no distension  Palpations: Abdomen is soft  Tenderness: There is no abdominal tenderness  Musculoskeletal: Normal range of motion  General: No swelling or tenderness  Right lower leg: No edema  Left lower leg: No edema  Skin:     General: Skin is warm and dry  Findings: No rash  Neurological:      Mental Status: He is alert     Psychiatric:         Mood and Affect: Mood normal

## 2021-04-21 NOTE — TELEPHONE ENCOUNTER
Critical Results   Call Received From [facility]    Lab Study WBC 73 01   Date Blood Work was Done 4/21   Relevant Information

## 2021-04-22 NOTE — ASSESSMENT & PLAN NOTE
Patient is on watchful observation  Blood test done this morning  WBC 73 0 1 thousands  Hemoglobin 10 6 - dropped from 11 1 in 12/20  Follow-up with hematology- oncology doctor Mona Holguin next week, will review blood test results

## 2021-04-22 NOTE — ASSESSMENT & PLAN NOTE
Symptoms improved with dietary modifications  Patient stopped taking Omeprazole and probiotic  He takes Pepcid AC PRN  Follow- up with gastroenterology Dr Abelardo Clinton

## 2021-04-26 NOTE — PROGRESS NOTES
Hematology/Oncology Outpatient Follow- up Note  Amanda Nieves 1960, [id number]  2021        Chief Complaint   Patient presents with    Follow-up       HPI:  Benito Napier is a 60 yo male who was referred to hematology for an elevated white count with lymphocytosis  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 63% of total events  He had lack of CD38 expression which is a favorable prognosis         Previous Hematologic/ Oncologic History:    Workup    Current Hematologic/ Oncologic Treatment:    Observation     ECO - Asymptomatic    Interval History:  The patient presents for routine follow up  He was last seen on 20  Most recent blood work completed on  was reviewed  His white count remains elevated at 73  His white count has been slowly rising since May 2020  His hemoglobin 10 6, , platelets 217, absolute lymphocytes 70 09  Calcium 7 9, creatinine 1 04,T Bili 1 16 remainder LFTS WNL  LDH normal      As far as symptoms are concerned, he states he has been feeling fairly well  He has some fatigue but remains active  He is riding his bike 2-3 times a week for up to 3 hours at a time  He has been having spontaneous night sweats, feels "clammy" on occasional  These symptoms are not happening consistently  Denies any shortness of breath, no chest pain  No adenopathy  Appetite is good  He has gained weight    He is following with ENT and is considering have nose reconstruction surgery  Cancer Staging:  Cancer Staging  No matching staging information was found for the patient  Molecular Testing:         Test Results:    Imaging: No results found      Labs:   Lab Results   Component Value Date    WBC 73 01 (HH) 2021    HGB 10 6 (L) 2021    HCT 34 2 (L) 2021     (H) 2021     2021     Lab Results   Component Value Date     2017    K 4 9 2021     (H) 2021    CO2 29 2021 BUN 16 04/21/2021    CREATININE 1 04 04/21/2021    GLUF 87 04/21/2021    CALCIUM 7 9 (L) 04/21/2021    AST 26 04/21/2021    ALT 25 04/21/2021    ALKPHOS 92 04/21/2021    PROT 6 2 03/17/2017    BILITOT 1 3 (H) 03/17/2017    EGFR 76 04/21/2021           Review of Systems   Constitutional: Positive for diaphoresis (intermittent) and fatigue (mild)  All other systems reviewed and are negative  Active Problems:   Patient Active Problem List   Diagnosis    Gastroesophageal reflux disease    CLL (chronic lymphocytic leukemia) (HCC)    Lymphocytosis    Axillary lymphadenitis    Laryngopharyngeal reflux (LPR)    Gallbladder polyp    Hx of Lyme disease    Paresis of right vocal fold    Dysphonia    Hypogammaglobulinemia (HCC)    Pharyngoesophageal dysphagia    Glottic insufficiency    Muscle tension dysphonia    Cough    Deviated nasal septum    Other irritable bowel syndrome    Thrombocytopenia, unspecified (HCC)    Generalized osteoarthritis    Dairy product intolerance    Gluten intolerance    Well adult exam       Past Medical History:   Past Medical History:   Diagnosis Date    Abdominal pain     Epigastric pain     Gallbladder polyp     Leukocytosis     Lymphocytosis     Panic attacks     years ago    Rosacea        Surgical History:   Past Surgical History:   Procedure Laterality Date    COLONOSCOPY  06/2019    HEMORRHOID SURGERY      MA COLONOSCOPY FLX DX W/COLLJ SPEC WHEN PFRMD N/A 4/11/2016    Procedure: COLONOSCOPY;  Surgeon: Anders Lind MD;  Location:  GI LAB; Service: Colorectal    MA ESOPHAGOGASTRODUODENOSCOPY TRANSORAL DIAGNOSTIC N/A 3/31/2017    Procedure: ESOPHAGOGASTRODUODENOSCOPY (EGD); Surgeon: Aida Castillo MD;  Location: [facility] GI LAB; Service: Gastroenterology    UPPER GASTROINTESTINAL ENDOSCOPY  01/2020       Family History:  No family history on file  Cancer-related family history is not on file      Social History:   Social History Socioeconomic History    Marital status: Single     Spouse name: Not on file    Number of children: Not on file    Years of education: Not on file    Highest education level: Not on file   Occupational History    Not on file   Social Needs    Financial resource strain: Not on file    Food insecurity     Worry: Not on file     Inability: Not on file    Transportation needs     Medical: Not on file     Non-medical: Not on file   Tobacco Use    Smoking status: Never Smoker    Smokeless tobacco: Never Used   Substance and Sexual Activity    Alcohol use: Yes     Comment: socially    Drug use: No    Sexual activity: Not on file   Lifestyle    Physical activity     Days per week: Not on file     Minutes per session: Not on file    Stress: Not on file   Relationships    Social connections     Talks on phone: Not on file     Gets together: Not on file     Attends [employer] service: Not on file     Active member of club or organization: Not on file     Attends meetings of clubs or organizations: Not on file     Relationship status: Not on file    Intimate partner violence     Fear of current or ex partner: Not on file     Emotionally abused: Not on file     Physically abused: Not on file     Forced sexual activity: Not on file   Other Topics Concern    Not on file   Social History Narrative    Not on file       Current Medications:   Current Outpatient Medications   Medication Sig Dispense Refill    Alum Hydroxide-Mag Carbonate (GAVISCON EXTRA STRENGTH) 166-579 MG/10ML SUSP Take by mouth      calcium carbonate (OS-MAGGIE) 600 MG tablet Take 600 mg by mouth daily       finasteride (PROPECIA) 1 MG tablet Take 1 mg by mouth daily      Probiotic Product (ALIGN) 4 MG CAPS Take 1 caps  daily  30 capsule 3     No current facility-administered medications for this visit  Allergies:    Allergies   Allergen Reactions    Other      Seasonal Allergies       Physical Exam:  /66 (BP Location: Left arm) Pulse 79   Temp 98 3 °F (36 8 °C) (Temporal)   Resp 16   Ht 5' 10" (1 778 m)   Wt 75 3 kg (166 lb)   SpO2 97%   BMI 23 82 kg/m²   Body surface area is 1 93 meters squared  Wt Readings from Last 3 Encounters:   04/28/21 75 3 kg (166 lb)   04/21/21 76 6 kg (168 lb 12 8 oz)   03/30/21 72 6 kg (160 lb)           Physical Exam  Constitutional:       General: He is not in acute distress  Appearance: He is well-developed  He is not diaphoretic  HENT:      Head: Normocephalic and atraumatic  Mouth/Throat:      Pharynx: No oropharyngeal exudate  Eyes:      General: No scleral icterus  Pupils: Pupils are equal, round, and reactive to light  Neck:      Musculoskeletal: Normal range of motion and neck supple  Cardiovascular:      Rate and Rhythm: Normal rate and regular rhythm  Heart sounds: Normal heart sounds  Pulmonary:      Effort: Pulmonary effort is normal  No respiratory distress  Breath sounds: Normal breath sounds  Abdominal:      General: Bowel sounds are normal  There is no distension  Palpations: Abdomen is soft  There is no mass  Tenderness: There is no abdominal tenderness  Musculoskeletal: Normal range of motion  Lymphadenopathy:      Cervical: No cervical adenopathy  Upper Body:      Right upper body: No supraclavicular, axillary or pectoral adenopathy  Left upper body: No supraclavicular, axillary or pectoral adenopathy  Skin:     General: Skin is warm and dry  Neurological:      General: No focal deficit present  Mental Status: He is alert and oriented to person, place, and time  Psychiatric:         Mood and Affect: Mood normal          Behavior: Behavior normal          Thought Content: Thought content normal          Judgment: Judgment normal          Assessment / Plan:    1   CLL [facility]chronic lymphocytic leukemia) ([facility] )      The patient is a very pleasant 58 yo male who was referred to hematology for an elevated white count with lymphocytosis  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 63% of total events  He had lack of CD38 expression which is a favorable prognosis  He has been on observation  His white count has been slowly rising over the past year  Most recent WBC was 73  His Hgb has also dropped to 10 6  He remains asymptomatic  He is active and clinically there are no concerns on exam today  No evidence of lymphadenopathy       He will remain on observation at this time  We will not treat unless his WBC approaches 100 or he becomes symptomatic  Patient verbalized understanding and was agreeable to plan of care  He is considering undergoing nose reconstruction surgery with ENT  We discussed this today and he may proceed with surgery  I did warn him that his white count may rise as a result  He could have a Leukemoid reaction  I have convinced him to have repeat blood work in 2 months and again in 4 months  I will call him with results of his blood work in 2 months   [facility] will return in 4 months at his request with repeat blood work  He was instructed to call with any questions or concerns prior to his next office visit  Goals and Barriers:  Current Goal:  Prolong Survival from CLL  Barriers: None  Patient's Capacity to Self Care:  Patient able to self care  Portions of the record may have been created with voice recognition software  Occasional wrong word or "sound a like" substitutions may have occurred due to the inherent limitations of voice recognition software  Read the chart carefully and recognize, using context, where substitutions have occurred

## 2021-04-28 ENCOUNTER — OFFICE VISIT (OUTPATIENT)
Dept: HEMATOLOGY ONCOLOGY | Facility: HOSPITAL | Age: 61
End: 2021-04-28
Payer: COMMERCIAL

## 2021-04-28 VITALS
HEART RATE: 79 BPM | SYSTOLIC BLOOD PRESSURE: 112 MMHG | RESPIRATION RATE: 16 BRPM | OXYGEN SATURATION: 97 % | WEIGHT: 166 LBS | BODY MASS INDEX: 23.77 KG/M2 | TEMPERATURE: 98.3 F | DIASTOLIC BLOOD PRESSURE: 66 MMHG | HEIGHT: 70 IN

## 2021-04-28 DIAGNOSIS — C91.10 CLL (CHRONIC LYMPHOCYTIC LEUKEMIA) (HCC): Primary | ICD-10-CM

## 2021-04-28 PROCEDURE — 3008F BODY MASS INDEX DOCD: CPT | Performed by: NURSE PRACTITIONER

## 2021-04-28 PROCEDURE — 99214 OFFICE O/P EST MOD 30 MIN: CPT | Performed by: NURSE PRACTITIONER

## 2021-04-28 PROCEDURE — 1036F TOBACCO NON-USER: CPT | Performed by: NURSE PRACTITIONER

## 2021-05-11 PROBLEM — J34.3 NASAL TURBINATE HYPERTROPHY: Status: ACTIVE | Noted: 2021-05-11

## 2021-05-11 PROBLEM — J34.89 NASAL OBSTRUCTION: Status: ACTIVE | Noted: 2021-05-11

## 2021-06-28 ENCOUNTER — TELEPHONE (OUTPATIENT)
Dept: FAMILY MEDICINE CLINIC | Facility: CLINIC | Age: 61
End: 2021-06-28

## 2021-06-28 NOTE — TELEPHONE ENCOUNTER
Patient set up pre op in August   Told by PCP to call and let us know if ok with oncologist, Dr Lazaro Valladares  He did clear with him  Surgery is  8/16/21 with Dr Emely Yoo

## 2021-07-26 ENCOUNTER — TELEPHONE (OUTPATIENT)
Dept: HEMATOLOGY ONCOLOGY | Facility: CLINIC | Age: 61
End: 2021-07-26

## 2021-07-26 ENCOUNTER — LAB (OUTPATIENT)
Dept: LAB | Facility: HOSPITAL | Age: 61
End: 2021-07-26
Payer: COMMERCIAL

## 2021-07-26 LAB
ALBUMIN SERPL BCP-MCNC: 4.2 G/DL (ref 3.5–5)
ALP SERPL-CCNC: 93 U/L (ref 46–116)
ALT SERPL W P-5'-P-CCNC: 30 U/L (ref 12–78)
ANION GAP SERPL CALCULATED.3IONS-SCNC: 5 MMOL/L (ref 4–13)
AST SERPL W P-5'-P-CCNC: 24 U/L (ref 5–45)
BASOPHILS # BLD MANUAL: 0 THOUSAND/UL (ref 0–0.1)
BASOPHILS NFR MAR MANUAL: 0 % (ref 0–1)
BILIRUB SERPL-MCNC: 1.48 MG/DL (ref 0.2–1)
BUN SERPL-MCNC: 25 MG/DL (ref 5–25)
CALCIUM SERPL-MCNC: 8.5 MG/DL (ref 8.3–10.1)
CHLORIDE SERPL-SCNC: 107 MMOL/L (ref 100–108)
CO2 SERPL-SCNC: 28 MMOL/L (ref 21–32)
CREAT SERPL-MCNC: 1.16 MG/DL (ref 0.6–1.3)
EOSINOPHIL # BLD MANUAL: 0 THOUSAND/UL (ref 0–0.4)
EOSINOPHIL NFR BLD MANUAL: 0 % (ref 0–6)
ERYTHROCYTE [DISTWIDTH] IN BLOOD BY AUTOMATED COUNT: 15.1 % (ref 11.6–15.1)
GFR SERPL CREATININE-BSD FRML MDRD: 67 ML/MIN/1.73SQ M
GLUCOSE P FAST SERPL-MCNC: 88 MG/DL (ref 65–99)
HCT VFR BLD AUTO: 35 % (ref 36.5–49.3)
HGB BLD-MCNC: 10.8 G/DL (ref 12–17)
LYMPHOCYTES # BLD AUTO: 61.74 THOUSAND/UL (ref 0.6–4.47)
LYMPHOCYTES # BLD AUTO: 73 % (ref 14–44)
MACROCYTES BLD QL AUTO: PRESENT
MCH RBC QN AUTO: 32 PG (ref 26.8–34.3)
MCHC RBC AUTO-ENTMCNC: 30.9 G/DL (ref 31.4–37.4)
MCV RBC AUTO: 104 FL (ref 82–98)
MONOCYTES # BLD AUTO: 16.07 THOUSAND/UL (ref 0–1.22)
MONOCYTES NFR BLD: 19 % (ref 4–12)
NEUTROPHILS # BLD MANUAL: 6.77 THOUSAND/UL (ref 1.85–7.62)
NEUTS SEG NFR BLD AUTO: 8 % (ref 43–75)
NRBC BLD AUTO-RTO: 0 /100 WBCS
PLATELET # BLD AUTO: 154 THOUSANDS/UL (ref 149–390)
PLATELET BLD QL SMEAR: ADEQUATE
PMV BLD AUTO: 11.1 FL (ref 8.9–12.7)
POTASSIUM SERPL-SCNC: 4.4 MMOL/L (ref 3.5–5.3)
PROT SERPL-MCNC: 6.4 G/DL (ref 6.4–8.2)
RBC # BLD AUTO: 3.38 MILLION/UL (ref 3.88–5.62)
SMUDGE CELLS BLD QL SMEAR: PRESENT
SODIUM SERPL-SCNC: 140 MMOL/L (ref 136–145)
TOTAL CELLS COUNTED SPEC: 100
WBC # BLD AUTO: 84.58 THOUSAND/UL (ref 4.31–10.16)

## 2021-07-26 PROCEDURE — 36415 COLL VENOUS BLD VENIPUNCTURE: CPT | Performed by: NURSE PRACTITIONER

## 2021-07-26 PROCEDURE — 85007 BL SMEAR W/DIFF WBC COUNT: CPT | Performed by: NURSE PRACTITIONER

## 2021-07-26 PROCEDURE — 85027 COMPLETE CBC AUTOMATED: CPT | Performed by: NURSE PRACTITIONER

## 2021-07-26 PROCEDURE — 80053 COMPREHEN METABOLIC PANEL: CPT | Performed by: NURSE PRACTITIONER

## 2021-07-26 NOTE — TELEPHONE ENCOUNTER
Critical Results   Call Received From [facility]    Lab Department Location  [place]    Lab Study WBC 84 58   Date Blood Work was Done Today    Read Back of Information Done Yes    Relevant Information

## 2021-07-26 NOTE — TELEPHONE ENCOUNTER
Patient with CLL  WBC remains under 100 patient is currently on observation  Follow up scheduled with repeat labs in September

## 2021-07-27 ENCOUNTER — RA CDI HCC (OUTPATIENT)
Dept: OTHER | Facility: HOSPITAL | Age: 61
End: 2021-07-27

## 2021-07-27 NOTE — PROGRESS NOTES
[facility]  coding opportunities          Chart reviewed, no opportunity found: CHART REVIEWED, NO OPPORTUNITY FOUND      bpa data has that the hypogammaglobulinemia has been captured-holding on this and plt are currently normal LM               Patients insurance company: [employer] (Medicare Advantage and Commercial)

## 2021-07-31 PROBLEM — Z01.810 PREOP CARDIOVASCULAR EXAM: Status: ACTIVE | Noted: 2021-04-18

## 2021-08-02 ENCOUNTER — OFFICE VISIT (OUTPATIENT)
Dept: FAMILY MEDICINE CLINIC | Facility: CLINIC | Age: 61
End: 2021-08-02
Payer: COMMERCIAL

## 2021-08-02 VITALS
HEART RATE: 64 BPM | WEIGHT: 167 LBS | BODY MASS INDEX: 23.91 KG/M2 | RESPIRATION RATE: 16 BRPM | DIASTOLIC BLOOD PRESSURE: 78 MMHG | OXYGEN SATURATION: 98 % | HEIGHT: 70 IN | TEMPERATURE: 98.3 F | SYSTOLIC BLOOD PRESSURE: 124 MMHG

## 2021-08-02 DIAGNOSIS — J34.2 NASAL SEPTAL DEVIATION: ICD-10-CM

## 2021-08-02 DIAGNOSIS — J34.89 NASAL OBSTRUCTION: ICD-10-CM

## 2021-08-02 DIAGNOSIS — Z01.810 PREOP CARDIOVASCULAR EXAM: Primary | ICD-10-CM

## 2021-08-02 DIAGNOSIS — C91.10 CLL (CHRONIC LYMPHOCYTIC LEUKEMIA) (HCC): ICD-10-CM

## 2021-08-02 DIAGNOSIS — K21.00 GASTROESOPHAGEAL REFLUX DISEASE WITH ESOPHAGITIS WITHOUT HEMORRHAGE: ICD-10-CM

## 2021-08-02 DIAGNOSIS — K21.9 LARYNGOPHARYNGEAL REFLUX (LPR): ICD-10-CM

## 2021-08-02 PROCEDURE — 3008F BODY MASS INDEX DOCD: CPT | Performed by: FAMILY MEDICINE

## 2021-08-02 PROCEDURE — 1036F TOBACCO NON-USER: CPT | Performed by: FAMILY MEDICINE

## 2021-08-02 PROCEDURE — 99214 OFFICE O/P EST MOD 30 MIN: CPT | Performed by: FAMILY MEDICINE

## 2021-08-02 PROCEDURE — 93000 ELECTROCARDIOGRAM COMPLETE: CPT | Performed by: FAMILY MEDICINE

## 2021-08-02 NOTE — ASSESSMENT & PLAN NOTE
EKG done in the office showed sinus rhythm, 65 bpm, PVC's  No acute ST- T wave changes  Blood work done on July 26, 2021  Creatinine 1 16, Hemoglobin 10 8 stable  WBC 84 58 thousands  Patient has CLL, currently on watchful observation  He was cleared for upcoming surgery by hematology -oncology Dr Urszula Brink  Cardiopulmonary status is stable  Patient is medically stable to proceed with rhinoplasty as scheduled on August 16, 2021 with ENT Dr Danyelle Gross  Recommended to avoid NSAID's, Aspirin 1 week prior to surgery  Encouraged patient to stay well hydrated  Avoid caffeine

## 2021-08-02 NOTE — PROGRESS NOTES
Chief Complaint   Patient presents with    Pre-op Exam     rhinoplasty surgery      Health Maintenance   Topic Date Due    Pneumococcal Vaccine: Pediatrics (0 to 5 Years) and At-Risk Patients (6 to 59 Years) (1 of 4 - PCV13) Never done    Annual Physical  Never done    Influenza Vaccine (1) 09/01/2021    Depression Screening PHQ  04/21/2022    BMI: Adult  05/11/2022    Colorectal Cancer Screening  06/12/2024    DTaP,Tdap,and Td Vaccines (3 - Td or Tdap) 05/13/2030    HIV Screening  Completed    Hepatitis C Screening  Completed    COVID-19 Vaccine  Completed    HIB Vaccine  Aged Out    Hepatitis B Vaccine  Aged Out    IPV Vaccine  Aged Out    Hepatitis A Vaccine  Aged Out    Meningococcal ACWY Vaccine  Aged Out    HPV Vaccine  Aged Out       Assessment/Plan:    Preop cardiovascular exam  EKG done in the office showed sinus rhythm, 65 bpm, PVC's  No acute ST- T wave changes  Blood work done on July 26, 2021  Creatinine 1 16, Hemoglobin 10 8 stable  WBC 84 58 thousands  Patient has CLL, currently on watchful observation  He was cleared for upcoming surgery by hematology -oncology Dr Fazal Zhong  Cardiopulmonary status is stable  Patient is medically stable to proceed with rhinoplasty as scheduled on August 16, 2021 with ENT Dr Aurora Navarrete  Recommended to avoid NSAID's, Aspirin 1 week prior to surgery  Encouraged patient to stay well hydrated  Avoid caffeine  C[facility]chronic lymphocytic leukemia) ([facility] )  Patient is on watchful observation  Follow-up with hematology -oncology Dr Fazal Zhong  Gastroesophageal reflux disease  Symptoms improved  Follow-up with gastroenterology Dr Cata Caldwell  Laryngopharyngeal reflux (LPR)  Patient reports improvement in hoarseness of the voice  Follow-up with ENT Dr Aurora Navarrete           Diagnoses and all orders for this visit:    Preop cardiovascular exam  -     POCT ECG    CLL (chronic lymphocytic leukemia) (HCC)    Nasal obstruction    Nasal septal deviation    Gastroesophageal reflux disease with esophagitis without hemorrhage    Laryngopharyngeal reflux (LPR)          Subjective:      Patient ID: Charlie Ross is a 59 y o  male  HPI     Patient presents for pre- op evaluation prior to  rhinoplasty scheduled with ENT Dr Rafa Bhatti on August 16, 2021  Patient has right septum deviation, difficulty breathing through his nose  Reviewed pre- admission testing from July 26, 2021  Fasting blood sugar 88, WBC 84 58 thousands,  Hemoglobin 10 8- stable since April 2021  Platelet count 450,316  Creatinine 1 16, potassium 4 4  Patient denies history of allergy to anesthesia drugs  Reports no easy bruising or bleeding  PMHx: CLL, GERD, LPR, Anxiety, osteoarthritis  Patient exercises on a regular basis  Reports no chest pain, exertional shortness of breath, dizziness  Denies heart palpitations  No prior cardiac history  Family history is positive for MI in his father who was a smoker and his mother had MI age [de-identified]  Patient has been followed by hematology -oncology Dr Forbes Her for CLL, last seen in 4/2021  Currently on watchful observation  Dr Forbes Her cleared patient for surgery  GERD - symptoms improved  Patient has been followed by gastroenterology Dr Bonilla Sensing  Stopped taking Omeprazole and probiotic  Denies abdominal pain, heartburn, diarrhea  Denies tobacco use  Stopped drinking beer  The following portions of the patient's history were reviewed and updated as appropriate: allergies, current medications, past medical history, past social history, past surgical history and problem list     Review of Systems   Constitutional: Positive for fatigue  Negative for activity change, appetite change, chills and fever  HENT: Positive for congestion  Negative for ear pain, hearing loss, mouth sores, nosebleeds, postnasal drip, sinus pressure, sore throat, tinnitus and trouble swallowing  Dental problem: mild  Eyes: Negative  Respiratory: Negative for cough, chest tightness, shortness of breath and wheezing  Cardiovascular: Negative for chest pain, palpitations and leg swelling  Gastrointestinal: Negative for abdominal pain, blood in stool, constipation, diarrhea, nausea and vomiting  Denies heartburn   Genitourinary: Negative for difficulty urinating, dysuria, flank pain, frequency and hematuria  Nocturia x 1-2   Musculoskeletal: Positive for arthralgias (reports improvement in right shoulder pain, ROM)  Negative for back pain, gait problem, joint swelling and neck pain  Skin: Negative for rash  Neurological: Negative for dizziness, syncope and headaches  Hematological: Negative  Psychiatric/Behavioral: Negative for dysphoric mood and sleep disturbance  Anxiety - mood has been stable         Objective:      /78 (BP Location: Right arm, Patient Position: Sitting, Cuff Size: Standard)   Pulse 64   Temp 98 3 °F (36 8 °C) (Tympanic)   Resp 16   Ht 5' 10" (1 778 m)   Wt 75 8 kg (167 lb)   SpO2 98%   BMI 23 96 kg/m²          Physical Exam  Vitals and nursing note reviewed  Constitutional:       Appearance: Normal appearance  HENT:      Head: Normocephalic and atraumatic  Right Ear: Tympanic membrane and external ear normal       Left Ear: External ear normal    Eyes:      Conjunctiva/sclera: Conjunctivae normal       Pupils: Pupils are equal, round, and reactive to light  Neck:      Vascular: No carotid bruit  Cardiovascular:      Rate and Rhythm: Normal rate and regular rhythm  Heart sounds: No murmur heard  Pulmonary:      Effort: Pulmonary effort is normal       Breath sounds: Normal breath sounds  Abdominal:      General: Abdomen is flat  Bowel sounds are normal  There is no distension  Palpations: Abdomen is soft  Tenderness: There is no abdominal tenderness  Musculoskeletal:         General: No swelling, tenderness or deformity  Normal range of motion  Cervical back: Normal range of motion and neck supple  Right lower leg: No edema  Left lower leg: No edema  Skin:     General: Skin is warm and dry  Findings: No rash  Neurological:      General: No focal deficit present  Mental Status: He is alert  Motor: No weakness        Gait: Gait normal    Psychiatric:         Mood and Affect: Mood normal

## 2021-08-11 ENCOUNTER — TELEPHONE (OUTPATIENT)
Dept: HEMATOLOGY ONCOLOGY | Facility: CLINIC | Age: 61
End: 2021-08-11

## 2021-08-11 NOTE — TELEPHONE ENCOUNTER
Appointment Confirmation     Appointment with  Dr Mónica Denney   Appointment date & time  09/24/2021   Location [place]   Patient verbilized Understanding

## 2021-09-20 ENCOUNTER — TELEPHONE (OUTPATIENT)
Dept: HEMATOLOGY ONCOLOGY | Facility: CLINIC | Age: 61
End: 2021-09-20

## 2021-09-20 NOTE — TELEPHONE ENCOUNTER
Patient just wanted to know about fasting before his labs  Information provided and patient understood

## 2021-09-21 ENCOUNTER — TELEPHONE (OUTPATIENT)
Dept: HEMATOLOGY ONCOLOGY | Facility: CLINIC | Age: 61
End: 2021-09-21

## 2021-09-21 ENCOUNTER — LAB (OUTPATIENT)
Dept: LAB | Facility: HOSPITAL | Age: 61
End: 2021-09-21
Payer: COMMERCIAL

## 2021-09-21 NOTE — TELEPHONE ENCOUNTER
Critical Results   Call Received From [facility] Department Location  [facility]   Lab Study WBC 89 99   Date Blood Work was Done  09/21/21    Read Back of Information Done yes   Relevant Information

## 2021-09-24 ENCOUNTER — OFFICE VISIT (OUTPATIENT)
Dept: HEMATOLOGY ONCOLOGY | Facility: HOSPITAL | Age: 61
End: 2021-09-24
Payer: COMMERCIAL

## 2021-09-24 VITALS
TEMPERATURE: 97.8 F | RESPIRATION RATE: 18 BRPM | BODY MASS INDEX: 23.45 KG/M2 | WEIGHT: 163.8 LBS | HEART RATE: 63 BPM | DIASTOLIC BLOOD PRESSURE: 72 MMHG | OXYGEN SATURATION: 97 % | SYSTOLIC BLOOD PRESSURE: 110 MMHG | HEIGHT: 70 IN

## 2021-09-24 DIAGNOSIS — C91.10 CLL (CHRONIC LYMPHOCYTIC LEUKEMIA) (HCC): Primary | ICD-10-CM

## 2021-09-24 DIAGNOSIS — C91.10 CHRONIC LYMPHOCYTIC LEUKEMIA OF B-CELL TYPE NOT HAVING ACHIEVED REMISSION (HCC): ICD-10-CM

## 2021-09-24 PROCEDURE — 99214 OFFICE O/P EST MOD 30 MIN: CPT | Performed by: INTERNAL MEDICINE

## 2021-09-24 PROCEDURE — 1036F TOBACCO NON-USER: CPT | Performed by: INTERNAL MEDICINE

## 2021-09-24 PROCEDURE — 3008F BODY MASS INDEX DOCD: CPT | Performed by: INTERNAL MEDICINE

## 2021-09-24 NOTE — PROGRESS NOTES
Hematology/Oncology Outpatient Follow- up Note  Pankaj Davis 59 y o  male MRN: @ Encounter: [id number]        Date:  9/24/2021    Presenting Complaint/Diagnosis : Elevated white count with lymphocytosis  Workup revealed CLL       Previous Hematologic/ Oncologic History:     workup    Current Hematologic/ Oncologic Treatment:     observation    Interval History:     the patient returns for follow-up visit  His white count is now 89 with a platelet count in the 145 range and hemoglobin that has gone down to the 9 range  I explained that he should consider treatment at this point  He has concerns about commuting to get treatment here as he does live around an hour away  I explained he should consider getting a local oncologist and he had been researching this also  And states there is a hospital near  which may have a [address] affiliated practice which I explained would have the same medications  As what we have discussed is quite routine and standard of care  As far symptoms are concerned he does feel more fatigued but otherwise is doing well  Denies any drenching night sweats  Denies any nausea vomiting  Denies any palpable lymphadenopathy  The rest of his 14 point review of systems today was negative  Test Results:    Imaging: No results found      Labs:   Lab Results   Component Value Date    WBC 89 99 (HH) 09/21/2021    HGB 9 0 (L) 09/21/2021    HCT 29 8 (L) 09/21/2021     (H) 09/21/2021     (L) 09/21/2021     Lab Results   Component Value Date     03/17/2017    K 4 6 09/21/2021     09/21/2021    CO2 28 09/21/2021    BUN 16 09/21/2021    CREATININE 1 20 09/21/2021    GLUF 94 09/21/2021    CALCIUM 8 1 (L) 09/21/2021    AST 23 09/21/2021    ALT 27 09/21/2021    ALKPHOS 104 09/21/2021    PROT 6 2 03/17/2017    BILITOT 1 3 (H) 03/17/2017    EGFR 64 09/21/2021       Lab Results   Component Value Date    PSA 0 4 05/18/2020       Lab Results   Component Value Date    IRON 62 (L) 10/11/2019    TIBC 240 (L) 10/11/2019    FERRITIN 468 (H) 10/11/2019       Lab Results   Component Value Date    QOGETDYV53 305 10/11/2019         ROS: As stated in the history of present illness otherwise his 14 point review of systems today was negative  Active Problems:   Patient Active Problem List   Diagnosis    Gastroesophageal reflux disease    CLL (chronic lymphocytic leukemia) (HCC)    Lymphocytosis    Axillary lymphadenitis    Laryngopharyngeal reflux (LPR)    Gallbladder polyp    Hx of Lyme disease    Paresis of right vocal fold    Dysphonia    Hypogammaglobulinemia (HCC)    Pharyngoesophageal dysphagia    Glottic insufficiency    Muscle tension dysphonia    Cough    Nasal septal deviation    Other irritable bowel syndrome    Thrombocytopenia, unspecified (HCC)    Generalized osteoarthritis    Dairy product intolerance    Gluten intolerance    Preop cardiovascular exam    Nasal obstruction    Nasal turbinate hypertrophy       Past Medical History:   Past Medical History:   Diagnosis Date    Abdominal pain     Epigastric pain     Gallbladder polyp     Leukocytosis     Lymphocytosis     Panic attacks     years ago    Rosacea        Surgical History:   Past Surgical History:   Procedure Laterality Date    COLONOSCOPY  06/2019    HEMORRHOID SURGERY      PA COLONOSCOPY FLX DX W/COLLJ SPEC WHEN PFRMD N/A 4/11/2016    Procedure: COLONOSCOPY;  Surgeon: Dolly Cameron MD;  Location:  GI LAB; Service: Colorectal    PA ESOPHAGOGASTRODUODENOSCOPY TRANSORAL DIAGNOSTIC N/A 3/31/2017    Procedure: ESOPHAGOGASTRODUODENOSCOPY (EGD); Surgeon: Ania Hernandez MD;  Location: [facility] GI LAB; Service: Gastroenterology    UPPER GASTROINTESTINAL ENDOSCOPY  01/2020       Family History:  No family history on file  Cancer-related family history is not on file      Social History:   Social History     Socioeconomic History    Marital status: Single Spouse name: Not on file    Number of children: Not on file    Years of education: Not on file    Highest education level: Not on file   Occupational History    Not on file   Tobacco Use    Smoking status: Never Smoker    Smokeless tobacco: Never Used   Vaping Use    Vaping Use: Never used   Substance and Sexual Activity    Alcohol use: Yes     Comment: socially    Drug use: No    Sexual activity: Not on file   Other Topics Concern    Not on file   Social History Narrative    Not on file     Social Determinants of Health     Financial Resource Strain:     Difficulty of Paying Living Expenses:    Food Insecurity:     Worried About Running Out of Food in the Last Year:     [address] in the Last Year:    Transportation Needs:     Lack of Transportation (Medical):  Lack of Transportation (Non-Medical):    Physical Activity:     Days of Exercise per Week:     Minutes of Exercise per Session:    Stress:     Feeling of Stress :    Social Connections:     Frequency of Communication with Friends and Family:     Frequency of Social Gatherings with Friends and Family:     Attends [employer] Services:     Active Member of Clubs or Organizations:     Attends Club or Organization Meetings:     Marital Status:    Intimate Partner Violence:     Fear of Current or Ex-Partner:     Emotionally Abused:     Physically Abused:     Sexually Abused:        Current Medications:   Current Outpatient Medications   Medication Sig Dispense Refill    Alum Hydroxide-Mag Carbonate (GAVISCON EXTRA STRENGTH) 924-992 MG/10ML SUSP Take by mouth      calcium carbonate (OS-MAGGIE) 600 MG tablet Take 600 mg by mouth 2 (two) times a day with meals       finasteride (PROPECIA) 1 MG tablet Take 1 mg by mouth daily      Probiotic Product (ALIGN) 4 MG CAPS Take 1 caps  daily  (Patient taking differently: Take 1 caps PRN) 30 capsule 3     No current facility-administered medications for this visit         Allergies: Allergies   Allergen Reactions    Other      Seasonal Allergies       Physical Exam:    Body surface area is 1 92 meters squared  Wt Readings from Last 3 Encounters:   09/24/21 74 3 kg (163 lb 12 8 oz)   09/21/21 75 8 kg (167 lb)   08/10/21 75 8 kg (167 lb)        Temp Readings from Last 3 Encounters:   09/24/21 97 8 °F (36 6 °C) (Tympanic)   09/21/21 (!) 97 3 °F (36 3 °C)   08/10/21 97 5 °F (36 4 °C)        BP Readings from Last 3 Encounters:   09/24/21 110/72   08/02/21 124/78   04/28/21 112/66         Pulse Readings from Last 3 Encounters:   09/24/21 63   08/02/21 64   04/28/21 79         Physical Exam     Constitutional   General appearance: No acute distress, well appearing and well nourished  Eyes   Conjunctiva and lids: No swelling, erythema or discharge  Pupils and irises: Equal, round and reactive to light  Ears, Nose, Mouth, and Throat   External inspection of ears and nose: Normal     Nasal mucosa, septum, and turbinates: Normal without edema or erythema  Oropharynx: Normal with no erythema, edema, exudate or lesions  Pulmonary   Respiratory effort: No increased work of breathing or signs of respiratory distress  Auscultation of lungs: Clear to auscultation  Cardiovascular   Palpation of heart: Normal PMI, no thrills  Auscultation of heart: Normal rate and rhythm, normal S1 and S2, without murmurs  Examination of extremities for edema and/or varicosities: Normal     Carotid pulses: Normal     Abdomen   Abdomen: Non-tender, no masses  Liver and spleen: No hepatomegaly or splenomegaly  Lymphatic   Palpation of lymph nodes in neck: No lymphadenopathy  Musculoskeletal   Gait and station: Normal     Digits and nails: Normal without clubbing or cyanosis  Inspection/palpation of joints, bones, and muscles: Normal     Skin   Skin and subcutaneous tissue: Normal without rashes or lesions  Neurologic   Cranial nerves: Cranial nerves 2-12 intact      Sensation: No sensory loss  Psychiatric   Orientation to person, place, and time: Normal     Mood and affect: Normal         Assessment / Plan:    The patient is a pleasant 49-year-old male who was referred to see us for an elevated white count and lymphocytosis  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 63% of total events  He had lack of CD38 expression which is a favorable prognosis  His white count is now up to 89  In the past he did not have any splenomegaly  Options are to treat or to wait until white count goes above 100 then consider treatment  Platelets were slightly lower   As is the hemoglobin so treatment is also indicated  I explained all this to the patient  He now lives further away and is concerned about getting treatment here and then driving home after treatment on also if he were to get sick to be admitted locally but have his oncologist far away  I think this is very reasonable to consider getting a local oncologist   I advised Luis Eduardo Childs of this  He was very appreciative  He will call the local oncology office and get an appointment to see them  We had discussed bendamustine and Rituxan versus ibrutinib and I explained there were newer anti CD 20 agents also that could be considered instead of Rituxan depending on physician preference  I explained these were all standard treatments  He will call the local oncology office to be seen there  If he wishes to come see me he is welcome and we have made an appointment for 2 months which he can always cancel as long as he is getting care locally  I advised him he should definitely consider starting treatment soon as his numbers are at a point where treatment would be indicated  The patient understands this as does his partner who himself is a physician  He will get treatment locally where he lives and will see us in 2 months if needed  Goals and Barriers:  Current Goal:  Prolong Survival from   CLL  Barriers: None  Patient's Capacity to Self Care:  Patient able to self care  Portions of the record may have been created with voice recognition software   Occasional wrong word or "sound a like" substitutions may have occurred due to the inherent limitations of voice recognition software   Read the chart carefully and recognize, using context, where substitutions have occurred

## 2021-09-27 ENCOUNTER — TELEPHONE (OUTPATIENT)
Dept: HEMATOLOGY ONCOLOGY | Facility: CLINIC | Age: 61
End: 2021-09-27

## 2021-09-27 NOTE — TELEPHONE ENCOUNTER
Pt has moved and now has a new oncologist  He will be seen at [facility] by Dr Oneil Stratton,, will need path, recent labs, and consult notes, please  fax #  [phone]

## 2021-10-21 ENCOUNTER — OFFICE VISIT (OUTPATIENT)
Dept: FAMILY MEDICINE CLINIC | Facility: CLINIC | Age: 61
End: 2021-10-21
Payer: COMMERCIAL

## 2021-10-21 VITALS
HEIGHT: 70 IN | TEMPERATURE: 98.1 F | DIASTOLIC BLOOD PRESSURE: 76 MMHG | BODY MASS INDEX: 23.19 KG/M2 | OXYGEN SATURATION: 98 % | HEART RATE: 74 BPM | WEIGHT: 162 LBS | SYSTOLIC BLOOD PRESSURE: 110 MMHG | RESPIRATION RATE: 16 BRPM

## 2021-10-21 DIAGNOSIS — Z00.00 WELL ADULT EXAM: Primary | ICD-10-CM

## 2021-10-21 DIAGNOSIS — C91.10 CLL (CHRONIC LYMPHOCYTIC LEUKEMIA) (HCC): ICD-10-CM

## 2021-10-21 DIAGNOSIS — K21.9 LARYNGOPHARYNGEAL REFLUX (LPR): ICD-10-CM

## 2021-10-21 DIAGNOSIS — M15.9 GENERALIZED OSTEOARTHRITIS: ICD-10-CM

## 2021-10-21 DIAGNOSIS — K21.00 GASTROESOPHAGEAL REFLUX DISEASE WITH ESOPHAGITIS WITHOUT HEMORRHAGE: ICD-10-CM

## 2021-10-21 PROCEDURE — 3725F SCREEN DEPRESSION PERFORMED: CPT | Performed by: FAMILY MEDICINE

## 2021-10-21 PROCEDURE — 99396 PREV VISIT EST AGE 40-64: CPT | Performed by: FAMILY MEDICINE

## 2021-10-21 PROCEDURE — 3008F BODY MASS INDEX DOCD: CPT | Performed by: FAMILY MEDICINE

## 2021-10-21 PROCEDURE — 1036F TOBACCO NON-USER: CPT | Performed by: FAMILY MEDICINE

## 2022-01-24 ENCOUNTER — TELEPHONE (OUTPATIENT)
Dept: OTHER | Facility: OTHER | Age: 62
End: 2022-01-24

## 2022-01-25 ENCOUNTER — TELEPHONE (OUTPATIENT)
Dept: FAMILY MEDICINE CLINIC | Facility: CLINIC | Age: 62
End: 2022-01-25

## 2022-01-25 NOTE — TELEPHONE ENCOUNTER
Patient phoned to state he established with a new PCP yesterday, Dr Samantha Dobson as patient has moved out of the area

## 2022-05-05 NOTE — TELEPHONE ENCOUNTER
05/05/22 11:10 AM     Thank you for your request  Your request has been received, reviewed, and the patient chart updated  The PCP has successfully been removed with a patient attribution note  This message will now be completed      Thank you  Violet Hylton

## 2022-10-12 PROBLEM — Z00.00 WELL ADULT EXAM: Status: RESOLVED | Noted: 2021-04-18 | Resolved: 2022-10-12

## 2022-11-21 PROBLEM — J34.3 NASAL TURBINATE HYPERTROPHY: Status: RESOLVED | Noted: 2021-05-11 | Resolved: 2022-11-21

## 2022-11-21 PROBLEM — J34.2 NASAL SEPTAL DEVIATION: Status: RESOLVED | Noted: 2020-07-01 | Resolved: 2022-11-21

## 2022-11-21 PROBLEM — J34.89 NASAL OBSTRUCTION: Status: RESOLVED | Noted: 2021-05-11 | Resolved: 2022-11-21

## 2023-02-01 ENCOUNTER — NEW PATIENT COMPREHENSIVE (OUTPATIENT)
Dept: URBAN - METROPOLITAN AREA CLINIC 79 | Facility: CLINIC | Age: 63
End: 2023-02-01

## 2023-02-01 DIAGNOSIS — H25.13: ICD-10-CM

## 2023-02-01 DIAGNOSIS — G43.B0: ICD-10-CM

## 2023-02-01 PROCEDURE — 99204 OFFICE O/P NEW MOD 45 MIN: CPT

## 2023-02-01 PROCEDURE — 92134 CPTRZ OPH DX IMG PST SGM RTA: CPT

## 2023-02-01 PROCEDURE — 92083 EXTENDED VISUAL FIELD XM: CPT

## 2023-02-01 ASSESSMENT — TONOMETRY
OS_IOP_MMHG: 15
OD_IOP_MMHG: 17

## 2023-02-01 ASSESSMENT — VISUAL ACUITY
OS_SC: 20/20
OS_SC: 20/30
OD_SC: 20/20
OD_SC: 20/50

## 2023-12-05 NOTE — TELEPHONE ENCOUNTER
20mg-1 PO BID
Bryant Bailey pt called in to request refill on omeprazole 
Need dose & frequency    Jennifer Green
Sent
[id number]

## 2024-02-07 ENCOUNTER — ESTABLISHED COMPREHENSIVE EXAM (OUTPATIENT)
Dept: URBAN - METROPOLITAN AREA CLINIC 79 | Facility: CLINIC | Age: 64
End: 2024-02-07

## 2024-02-07 DIAGNOSIS — H25.13: ICD-10-CM

## 2024-02-07 DIAGNOSIS — G43.B0: ICD-10-CM

## 2024-02-07 PROCEDURE — 92014 COMPRE OPH EXAM EST PT 1/>: CPT

## 2024-02-07 ASSESSMENT — TONOMETRY
OD_IOP_MMHG: 17
OS_IOP_MMHG: 15

## 2024-02-07 ASSESSMENT — VISUAL ACUITY
OS_SC: J3
OS_CC: 20/20-1
OD_SC: 20/20
OS_SC: 20/20-1
OD_CC: 20/20-1
OD_SC: J5

## 2024-02-21 PROBLEM — R05.9 COUGH: Status: RESOLVED | Noted: 2020-07-01 | Resolved: 2024-02-21

## 2024-05-22 ENCOUNTER — TELEPHONE (OUTPATIENT)
Age: 64
End: 2024-05-22

## 2024-05-22 NOTE — TELEPHONE ENCOUNTER
Patient called back and stated he has moved and now he is located in [place]. He will not be scheduling at this time thank you

## 2025-02-12 ENCOUNTER — ESTABLISHED COMPREHENSIVE EXAM (OUTPATIENT)
Dept: URBAN - METROPOLITAN AREA CLINIC 79 | Facility: CLINIC | Age: 65
End: 2025-02-12

## 2025-02-12 DIAGNOSIS — H04.123: ICD-10-CM

## 2025-02-12 DIAGNOSIS — G43.B0: ICD-10-CM

## 2025-02-12 DIAGNOSIS — H01.02B: ICD-10-CM

## 2025-02-12 DIAGNOSIS — H57.13: ICD-10-CM

## 2025-02-12 DIAGNOSIS — H01.02A: ICD-10-CM

## 2025-02-12 DIAGNOSIS — H25.13: ICD-10-CM

## 2025-02-12 PROCEDURE — 92014 COMPRE OPH EXAM EST PT 1/>: CPT

## 2025-02-12 ASSESSMENT — VISUAL ACUITY
OS_SC: 20/30
OD_GLARE: 20/20
OD_SC: 20/20
OD_SC: 20/50
OS_SC: 20/20-3
OS_GLARE: 20/25

## 2025-02-12 ASSESSMENT — TONOMETRY
OS_IOP_MMHG: 17
OD_IOP_MMHG: 15